# Patient Record
Sex: FEMALE | Race: WHITE | NOT HISPANIC OR LATINO | Employment: OTHER | URBAN - METROPOLITAN AREA
[De-identification: names, ages, dates, MRNs, and addresses within clinical notes are randomized per-mention and may not be internally consistent; named-entity substitution may affect disease eponyms.]

---

## 2022-01-14 ENCOUNTER — TRANSCRIBE ORDERS (OUTPATIENT)
Dept: ADMINISTRATIVE | Facility: HOSPITAL | Age: 77
End: 2022-01-14

## 2022-01-14 DIAGNOSIS — A04.72 C. DIFFICILE ENTERITIS: Primary | ICD-10-CM

## 2022-01-17 NOTE — NURSING NOTE
1440-  Pt notified earlier that Zinplava infusion had not yet arrived to our pharmacy and request she call in the morning before coming to the appointment. Pt had questions r/t the medication, questions answered and side effect of possible CHF  And the need for f/u by the office by phone afterwards. Pt states unsure if she wants this. Encouraged her to discuss with the Dr that ordered the medication.    Pt called back and states after talking with the nurse at Dr's office she has decided she does not want this medicine. States she will be starting  another medicine, Deficit tomorrow and has colonoscopy Thursday.     Informed our in house pharmacist.

## 2022-01-18 ENCOUNTER — HOSPITAL ENCOUNTER (OUTPATIENT)
Dept: INFUSION THERAPY | Facility: HOSPITAL | Age: 77
Discharge: HOME | End: 2022-01-18

## 2022-01-20 ENCOUNTER — TRANSCRIBE ORDERS (OUTPATIENT)
Dept: ADMINISTRATIVE | Facility: HOSPITAL | Age: 77
End: 2022-01-20

## 2022-01-20 DIAGNOSIS — K62.89 ANAL OR RECTAL PAIN: Primary | ICD-10-CM

## 2022-01-20 DIAGNOSIS — R06.02 SHORTNESS OF BREATH: ICD-10-CM

## 2022-02-01 ENCOUNTER — HOSPITAL ENCOUNTER (OUTPATIENT)
Dept: CT IMAGING | Facility: HOSPITAL | Age: 77
Discharge: HOME | End: 2022-02-01
Admitting: COLON & RECTAL SURGERY

## 2022-02-01 DIAGNOSIS — R06.02 SHORTNESS OF BREATH: ICD-10-CM

## 2022-02-01 DIAGNOSIS — K62.89 ANAL OR RECTAL PAIN: ICD-10-CM

## 2022-02-01 PROCEDURE — 71250 CT THORAX DX C-: CPT

## 2022-02-01 PROCEDURE — 74176 CT ABD & PELVIS W/O CONTRAST: CPT

## 2022-02-03 ENCOUNTER — HOSPITAL ENCOUNTER (OUTPATIENT)
Dept: MRI IMAGING | Facility: HOSPITAL | Age: 77
Discharge: HOME | End: 2022-02-03
Admitting: COLON & RECTAL SURGERY

## 2022-02-03 DIAGNOSIS — K62.89 ANAL OR RECTAL PAIN: ICD-10-CM

## 2022-02-03 PROCEDURE — 72195 MRI PELVIS W/O DYE: CPT

## 2022-02-09 ENCOUNTER — HOSPITAL ENCOUNTER (OUTPATIENT)
Dept: RADIATION ONCOLOGY | Facility: HOSPITAL | Age: 77
Setting detail: RADIATION/ONCOLOGY SERIES
Discharge: HOME | End: 2022-02-09

## 2022-02-10 ENCOUNTER — TELEPHONE (OUTPATIENT)
Dept: RADIATION ONCOLOGY | Facility: HOSPITAL | Age: 77
End: 2022-02-10

## 2022-02-10 NOTE — TELEPHONE ENCOUNTER
Radiation Oncology appointment and outside film reminder-Spoke with patient she verbalized understanding on importance of bringing outside films to consult-RONA Ryan

## 2022-02-14 ENCOUNTER — APPOINTMENT (OUTPATIENT)
Dept: MRI IMAGING | Facility: HOSPITAL | Age: 77
End: 2022-02-14

## 2022-02-15 ENCOUNTER — NURSE NAVIGATOR (OUTPATIENT)
Dept: ONCOLOGY | Facility: CLINIC | Age: 77
End: 2022-02-15

## 2022-02-15 NOTE — PROGRESS NOTES
Danielle in Radiation called me yesterday to ask about patient to see why she did not show up for her appointment with Dr. So yesterday.  I reached out to patient today and she said that she had some issues she was trying to work out at a doctor's office.  She said that MD had ordered an MRI and she had already had a MRI.  I introduced my role to patient and asked if I could help her sort the issues out for her.  She said that she was going to think on it and maybe call me back. I gave patient my name and my number to call if she decided to let me navigate her.  Patient thanked me. I notified Danielle in Radiation about patient's concerns. I also Dr. Henderson and asked her to please call the patient. AG

## 2022-02-17 ENCOUNTER — LAB REQUISITION (OUTPATIENT)
Dept: LAB | Facility: HOSPITAL | Age: 77
End: 2022-02-17

## 2022-02-17 DIAGNOSIS — K62.89 OTHER SPECIFIED DISEASES OF ANUS AND RECTUM: ICD-10-CM

## 2022-02-17 LAB
CYTO UR: NORMAL
LAB AP CASE REPORT: NORMAL
LAB AP CLINICAL INFORMATION: NORMAL
LAB AP DIAGNOSIS COMMENT: NORMAL
PATH REPORT.FINAL DX SPEC: NORMAL
PATH REPORT.GROSS SPEC: NORMAL

## 2022-02-21 ENCOUNTER — OFFICE VISIT (OUTPATIENT)
Dept: RADIATION ONCOLOGY | Facility: HOSPITAL | Age: 77
End: 2022-02-21

## 2022-02-21 VITALS
HEIGHT: 61 IN | DIASTOLIC BLOOD PRESSURE: 77 MMHG | BODY MASS INDEX: 25.3 KG/M2 | SYSTOLIC BLOOD PRESSURE: 129 MMHG | HEART RATE: 67 BPM | RESPIRATION RATE: 16 BRPM | WEIGHT: 134 LBS | OXYGEN SATURATION: 96 % | TEMPERATURE: 98.3 F

## 2022-02-21 DIAGNOSIS — C20 RECTAL CANCER: Primary | ICD-10-CM

## 2022-02-21 PROCEDURE — G0463 HOSPITAL OUTPT CLINIC VISIT: HCPCS

## 2022-02-21 RX ORDER — UBIDECARENONE 100 MG
100 CAPSULE ORAL DAILY
COMMUNITY

## 2022-02-21 RX ORDER — MAGNESIUM CHLORIDE 64 MG
128 TABLET, DELAYED RELEASE (ENTERIC COATED) ORAL NIGHTLY
COMMUNITY

## 2022-02-21 RX ORDER — LEVOTHYROXINE SODIUM 88 UG/1
88 TABLET ORAL DAILY
COMMUNITY
Start: 2021-12-26

## 2022-02-21 RX ORDER — SERTRALINE HYDROCHLORIDE 25 MG/1
25 TABLET, FILM COATED ORAL DAILY
COMMUNITY
Start: 2021-12-27

## 2022-02-21 RX ORDER — VIT C/B6/B5/MAGNESIUM/HERB 173 50-5-6-5MG
1500 CAPSULE ORAL DAILY
COMMUNITY

## 2022-02-21 NOTE — PROGRESS NOTES
CONSULTATION NOTE    NAME:      Karolina Cardoso  :                                                          1945  DATE OF CONSULTATION:                       22  REQUESTING PHYSICIAN:                   Kristi Henderson MD  REASON FOR CONSULTATION:           Further evaluation and management of the patient's adenocarcinoma the rectum T3 N0 M0 for consideration of total neoadjuvant therapy at request of Dr. Henderson           BRIEF HISTORY:  Karolina Cardoso  is a very pleasant 76 y.o. female  with history of hemorrhoids and rectal prolapse as well as C. difficile who began to have loose stools about 3 months ago.  She reports that things got worse in December after she finished treatments for C. difficile.  The patient reported some bleeding from her hemorrhoids but no obvious bleeding in the stool.  The patient was then seen by Dr. Henderson who performed a colonoscopy 2022.  This showed a tumor at the second rectal valve which was ulcerated and circumferential.  Pathology was consistent with adenocarcinoma MSI intact.  The patient was then sent for staging CT scan abdomen pelvis as well as the chest.  This showed groundglass opacities in the lungs and some rectal fullness.  The patient also had some cysts in the liver.  The patient was then sent for an MRI 2/3/2022 of the pelvis that showed a tumor 8 cm from the dentate line.  The patient's tumor straddled the peritoneal reflection.  Craniocaudally the tumor measured about 3 cm.  Was felt to be a T3 based on imaging.  There is greater than 6 mm to the mesorectal fascia.  The patient had a questionable lymph node of uncertain significance measuring by 2mm from the mesorectal fascia on MRI scan.  Confusingly the patient's CT scan of the chest was read by different reader and it was read that the patient had low-density masses in the liver concerning for metastatic disease.  The patient case was presented at tumor board and it was felt that the patient should have a  CT scan of the abdomen to clarify the lesions in the liver their significance.  The patient was referred to [facility] for consideration of radiation and chemotherapy.    The patient reports that she is going through a lot personally right now she has a daughter who is going to the process for liver transplant.  She reports that her daughter is presently in the ER as we speak at the consult getting a paracentesis.  The patient reports that she has had rectal issues for quite some time.  The patient denies any fevers, chills, nausea, vomiting.  Patient reports that she already uses baby wipes.      BMI:  Body mass index is 25.32 kg/m².      Social History     Substance and Sexual Activity   Alcohol Use Not Currently   • Alcohol/week: 1.0 - 2.0 standard drink   • Types: 1 - 2 Glasses of wine per week       Allergies   Allergen Reactions   • Tetracycline Rash       Social History     Tobacco Use   • Smoking status: Former Smoker     Quit date:      Years since quittin.1   • Smokeless tobacco: Never Used   Substance Use Topics   • Alcohol use: Not Currently     Alcohol/week: 1.0 - 2.0 standard drink     Types: 1 - 2 Glasses of wine per week   • Drug use: Not on file         Past Medical History:   Diagnosis Date   • Rectal cancer (HCC)        family history includes Colon cancer in her sister.     Past Surgical History:   Procedure Laterality Date   • COLONOSCOPY  2022   • CORONARY ARTERY BYPASS GRAFT          Review of Systems   Constitutional: Positive for appetite change.   Gastrointestinal: Positive for blood in stool, constipation and rectal pain.        Rectal pressure/ pain   Musculoskeletal: Positive for arthralgias.   Psychiatric/Behavioral: Positive for sleep disturbance.    A full 14 point review of systems was performed and was negative except as noted in the HPI.         Objective   VITAL SIGNS:   Vitals:    22 1015   BP: 129/77   Pulse: 67   Resp: 16   Temp: 98.3 °F (36.8 °C)  "  SpO2: 96%  Comment: RA   Weight: 60.8 kg (134 lb)   Height: 154.9 cm (61\")   PainSc: 0-No pain        KPS       90%    Physical Exam  Vitals and nursing note reviewed.   Constitutional:       Appearance: She is well-developed.   HENT:      Head: Normocephalic and atraumatic.   Eyes:      Conjunctiva/sclera: Conjunctivae normal.      Pupils: Pupils are equal, round, and reactive to light.   Neck:      Comments: No obviously enlarged cervical or supraclavicular LAD.  Cardiovascular:      Comments: Patient well perfused. Non cyanotic. No prominent JVD. No pedal edema  Pulmonary:      Effort: Pulmonary effort is normal.      Breath sounds: Normal breath sounds. No stridor. No wheezing.   Abdominal:      General: There is no distension.      Palpations: Abdomen is soft.   Genitourinary:     Comments: Rectal exam deferred per patient until next week when she makes up her mind  Musculoskeletal:         General: Normal range of motion.      Cervical back: Normal range of motion and neck supple.      Comments: Patient moves all extremities spontaneously.    Skin:     General: Skin is warm and dry.   Neurological:      Mental Status: She is alert and oriented to person, place, and time.      Comments: Coordination intact.   Psychiatric:         Behavior: Behavior normal.         Thought Content: Thought content normal.         Judgment: Judgment normal.          The following portions of the patient's history were reviewed and updated as appropriate: allergies, current medications, past family history, past medical history, past social history, past surgical history and problem list.    I have personally requested reviewed and interpreted the patient's images and radiology reports and pathology reports listed below:  CT Abdomen Pelvis Without Contrast    Result Date: 2/1/2022  1.  Some fullness to the rectal area. Assessment is limited by the lack of contrast in this area. An underlying primary colonic process in the rectal " area not excluded. There is some stranding in the fat around this area that may relate to inflammation. 2.  Moderate stool burden within the colon that could relate to constipation. 3.  Hypodense areas within the liver that may relate to cysts or hemangiomas difficult to characterize on this noncontrasted exam. 4.  Atherosclerotic changes are present. 5.  Degenerative change lumbar spine. 6.  Groundglass changes within the lungs that may be reflective of Covid pneumonia.  This report was finalized on 2/1/2022 4:09 PM by Miguel Paulino MD.      CT Chest Without Contrast Diagnostic    Result Date: 2/1/2022   1.  Areas of patchy bilateral groundglass airspace disease favoring infectious or inflammatory process. 2.  No definite metastatic disease within the chest. 3.  Low density masses within the liver which are suspicious for metastatic disease. 4.  Thickening of the fundus of the stomach which is nonspecific.  Upper endoscopy should be considered to exclude true underlying mucosal lesion.  This report was finalized on 2/1/2022 3:58 PM by Brandon Zendejas MD.      MRI Pelvis Without Contrast    Addendum Date: 2/6/2022    Addendum for subspecialist review and completion of structured staging reporting.  CLINICAL INFORMATION: POLYPOID RECTAL MASS ON PROCTOSCOPY.  IMAGING PROCEDURE DESCRIPTION:      Image Quality: ADEQUATE      Magnet: 1.5 T      Sequences: RECTAL CANCER STAGING PROTOCOL INCLUDING AXIAL T2 FAT SAT, AXIAL STIR, CORONAL STIR, AXIAL, CORONAL, AND SAGITTAL T2 NONFAT SAT OF THE RECTUM.  FINDINGS: 1.) TUMOR LOCATION AND CHARACTERISTICS i) Distance of Inferior margin of Tumor from the dentate line: 8 CM ii) Tumor at or below the puborectalis sling:  ABOVE iii) Relationship to the anterior peritoneal reflection: STRADDLES (Sagittal T2 image 9 series 7) iv) Craniocaudal length of the tumor: 3cm v) Clock face of tumor: 4o'clock to 2o'clock vi) Polypoid/ Annular/ Semi-annular: MIXED WITH BOTH A POSTERIOR SUPERIOR  POLYPOID COMPONENT AND A NEARLY ANNULAR INFERIOR COMPONENT. vii) Mucinous: NO  2.) EXTRAMURAL DEPTH OF INVASION AND MR T-CATEGORY i) Extramural depth of invasion (Use 0mm for T1 or T2 tumor): 6 mm (anteriorly on image 15 series 10) ii) T category: T3           *Please indicate structures with possible invasion.  Specify laterality, sequence and slice#: (see list below) *  Anterior peritoneal reflection (T4a tumor): NO *  Puborectalis: NO *  Bladder: NO *  Vascular Involvement of Iliac Vessels: NO *  Levator ani: NO *  Ureters: NO *  Obturator: NO *  Prostate: NOT APPLICABLE *  Piriformis: NO *  Uterus: NO *  Pelvic Bone: NO *  Vagina: NO *  Sacrum: NO *  Urethra: NO *  Other: NONE    iii) For low rectal tumors (maximum tumor depth at or below the puborectalis sling):  *  Not applicable (tumor above the puborectalis sling: YES  3. RELATIONSHIP OF THE TUMOR TO MESORECTAL FASCIA (MRF)   i) Shortest distance 6mm of the definitive tumour border to the MRF is: AT ANTERIOR  11-12:00 POSITION  ii) Are there any tumour spiculations closer to the MRF? NO  4. EXTRAMURAL VENOUS INVASION    i) Extramural Venous Invasion (EMVI): ABSENT  5. MESORECTAL LYMPH NODES AND TUMOUR DEPOSITS    i) Any suspicious mesorectal lymph nodes/tumor deposits: EQUIVOCAL ROUND 6 MM LYMPH NODE ON THE RIGHT AT THE 9:00 POSITION BEST SEEN ON AXIAL IMAGE 14 OF SERIES 8     *If yes, the most suspicious node/tumor deposit is: The lymph node is 2 mm from the mesorectal fascia on the right 9:00 position  6. EXTRAMESORECTAL LYMPH NODES     i) Any suspicious extramesorectal lymph nodes: NONE    ii) Is the ANA node station in the field of view: NO      7. OTHER FINDINGS (COMPLICATIONS, METASTASES, LIMITATIONS)        INCIDENTAL SMALL LEFT FAT-CONTAINING INGUINAL HERNIA. NO SUSPICIOUS OSSEOUS LESIONS.  IMPRESSIONS: MRI rectal cancer T category is: T3 Maximum EMD of invasion is: 6 MM Minimum tumor to MRF distance is: 6 MM Low rectal tumor component: NO Mesorectal  nodes/tumor deposits: EQUIVOCAL EMVI: NO Extramesorectal nodes: NO        This report was finalized on 2/6/2022 10:58 AM by Ricky Pennington MD.      Result Date: 2/6/2022  1.  4.6 cm mass within rectosigmoid junction, compatible with known neoplasm. 2.  No definite evidence of metastasis within pelvis.  This report was finalized on 2/3/2022 12:42 PM by Senthil Vazquez MD.      I reviewed all the images personally.  I discussed case personally with Dr. Henderson.  I review Dr. English's most recent note.  I reviewed the patient's colonoscopy report.  I reviewed the patient's pathology report.      Assessment      IMPRESSION:     The patient is a very pleasant 76-year-old female with a T3 N0 M1 adenocarcinoma of the rectum with a questionable lymph node and a large circumferential tumor.  The patient has serious rectal issues at baseline.  The patient is interested in potentially trying to avoid surgery if possible.  We discussed treatment options today and I recommended total neoadjuvant therapy and alignment with the patient's tumor board discussion.  We discussed that the patient would likely need to receive 50.4-54 Gray in 1.8 Gray per fraction.  I would recommend IMRT and IGR T given the tumors location to large amount of adjacent bowel.  We discussed the process for planning, and treatments.  IGR T and IMRT will also allow for tighter margins and less sphincter overlap for treatments which would be useful in this patient with a compromised pelvic floor musculature at baseline.  We discussed integration of chemotherapy.  We discussed side effects and potential complications.  We discussed the rate of potential avoidance of surgery and the rates for local recurrence.  Time we discussed the relative roles of chemotherapy and its importance for treating distant disease.  I discussed the case with Dr. Henderson and the patient is interested in proceeding with a CT scan of her liver to rule out any distant disease that may be there  based on the contradictory reports in the CT scans.    Greater than 1 hour was spent preparing for and coordinating this visit. >50% of the time was spent in direct face to face conversation with the patient teaching, answering question, and providing explanations regarding the patient's case.  The decision to treat the patient with radiation is a complex one and carries the risk of long-term side effects and complications.  The patient's malignancy represents a complicated life threatening condition that requires complex multidisciplinary management for treatment and followup.     RECOMMENDATIONS:      T3 N0 M0 adenocarcinoma the rectum  -Recommend WOODY versus definitive  -Patient very serious about refusing surgery  -Recommend dose of 50 Gray to 54 Gray IMRT 1.8 Gray per fraction  -Recommend chemoradiotherapy followed by adjuvant chemotherapy plus or minus surgery based on response and patient preference  -Follows with Dr. Henderson  -Consultation with Dr. Graves on Friday  -Patient booked for CT simulation next week after follow-up visit with myself.    Grade 3 hemorrhoids  -Patient high risk for worsening of hemorrhoids during treatment    Rectal prolapse  -Patient's pelvic floor reportedly dysfunctional baseline  -Increases risk of pelvic floor insufficiency in the future           Ish So MD        Errors in dictation may reflect use of voice recognition software and not all errors in transcription may have been detected prior to signing.

## 2022-02-23 ENCOUNTER — NURSE NAVIGATOR (OUTPATIENT)
Dept: ONCOLOGY | Facility: CLINIC | Age: 77
End: 2022-02-23

## 2022-02-23 NOTE — PROGRESS NOTES
I talked with patient today to follow up from our last conversation. Patient requested that I cancel her 2/25 appointment with Dr. Graves due to the fact that she was going for a second opinion at the beginning of next week. I encouraged patient to please call me if she did want me to reschedule with Dr. Graves and patient verbalized understanding. AG

## 2022-06-30 ENCOUNTER — PREP FOR SURGERY (OUTPATIENT)
Dept: OTHER | Facility: HOSPITAL | Age: 77
End: 2022-06-30

## 2022-07-12 ENCOUNTER — HOSPITAL ENCOUNTER (OUTPATIENT)
Dept: GENERAL RADIOLOGY | Facility: HOSPITAL | Age: 77
Discharge: HOME | End: 2022-07-12

## 2022-07-12 ENCOUNTER — PRE-ADMISSION TESTING (OUTPATIENT)
Dept: PREADMISSION TESTING | Facility: HOSPITAL | Age: 77
End: 2022-07-12

## 2022-07-12 LAB
ABO GROUP BLD: NORMAL
ALBUMIN SERPL-MCNC: 4.1 G/DL (ref 3.5–5.2)
ALBUMIN/GLOB SERPL: 1.6 G/DL
ALP SERPL-CCNC: 70 U/L (ref 39–117)
ALT SERPL W P-5'-P-CCNC: 8 U/L (ref 1–33)
ANION GAP SERPL CALCULATED.3IONS-SCNC: 9 MMOL/L (ref 5–15)
AST SERPL-CCNC: 12 U/L (ref 1–32)
BILIRUB SERPL-MCNC: 0.3 MG/DL (ref 0–1.2)
BLD GP AB SCN SERPL QL: NEGATIVE
BUN SERPL-MCNC: 17 MG/DL (ref 8–23)
BUN/CREAT SERPL: 30.9 (ref 7–25)
CALCIUM SPEC-SCNC: 8.7 MG/DL (ref 8.6–10.5)
CEA SERPL-MCNC: 14.6 NG/ML
CHLORIDE SERPL-SCNC: 106 MMOL/L (ref 98–107)
CO2 SERPL-SCNC: 27 MMOL/L (ref 22–29)
CREAT SERPL-MCNC: 0.55 MG/DL (ref 0.57–1)
DEPRECATED RDW RBC AUTO: 43.3 FL (ref 37–54)
EGFRCR SERPLBLD CKD-EPI 2021: 94.5 ML/MIN/1.73
ERYTHROCYTE [DISTWIDTH] IN BLOOD BY AUTOMATED COUNT: 13.9 % (ref 12.3–15.4)
GLOBULIN UR ELPH-MCNC: 2.5 GM/DL
GLUCOSE SERPL-MCNC: 110 MG/DL (ref 65–99)
HBA1C MFR BLD: 5.5 % (ref 4.8–5.6)
HCT VFR BLD AUTO: 40.2 % (ref 34–46.6)
HGB BLD-MCNC: 13 G/DL (ref 12–15.9)
MCH RBC QN AUTO: 27.8 PG (ref 26.6–33)
MCHC RBC AUTO-ENTMCNC: 32.3 G/DL (ref 31.5–35.7)
MCV RBC AUTO: 85.9 FL (ref 79–97)
PLATELET # BLD AUTO: 288 10*3/MM3 (ref 140–450)
PMV BLD AUTO: 9.8 FL (ref 6–12)
POTASSIUM SERPL-SCNC: 4 MMOL/L (ref 3.5–5.2)
PROT SERPL-MCNC: 6.6 G/DL (ref 6–8.5)
QT INTERVAL: 408 MS
QTC INTERVAL: 400 MS
RBC # BLD AUTO: 4.68 10*6/MM3 (ref 3.77–5.28)
RH BLD: POSITIVE
SARS-COV-2 RNA PNL SPEC NAA+PROBE: NOT DETECTED
SODIUM SERPL-SCNC: 142 MMOL/L (ref 136–145)
T&S EXPIRATION DATE: NORMAL
WBC NRBC COR # BLD: 5.11 10*3/MM3 (ref 3.4–10.8)

## 2022-07-12 PROCEDURE — 86900 BLOOD TYPING SEROLOGIC ABO: CPT

## 2022-07-12 PROCEDURE — 85027 COMPLETE CBC AUTOMATED: CPT

## 2022-07-12 PROCEDURE — C9803 HOPD COVID-19 SPEC COLLECT: HCPCS

## 2022-07-12 PROCEDURE — 93010 ELECTROCARDIOGRAM REPORT: CPT | Performed by: INTERNAL MEDICINE

## 2022-07-12 PROCEDURE — 82378 CARCINOEMBRYONIC ANTIGEN: CPT

## 2022-07-12 PROCEDURE — 93005 ELECTROCARDIOGRAM TRACING: CPT

## 2022-07-12 PROCEDURE — 80053 COMPREHEN METABOLIC PANEL: CPT

## 2022-07-12 PROCEDURE — 86901 BLOOD TYPING SEROLOGIC RH(D): CPT

## 2022-07-12 PROCEDURE — 71046 X-RAY EXAM CHEST 2 VIEWS: CPT

## 2022-07-12 PROCEDURE — 36415 COLL VENOUS BLD VENIPUNCTURE: CPT

## 2022-07-12 PROCEDURE — U0004 COV-19 TEST NON-CDC HGH THRU: HCPCS

## 2022-07-12 PROCEDURE — 83036 HEMOGLOBIN GLYCOSYLATED A1C: CPT

## 2022-07-12 PROCEDURE — 86850 RBC ANTIBODY SCREEN: CPT

## 2022-07-12 RX ORDER — ZINC GLUCONATE 50 MG
50 TABLET ORAL DAILY
COMMUNITY

## 2022-07-12 NOTE — PAT
An arrival time for procedure was not given during PAT visit. If patient had any questions or concerns about their arrival time, they were instructed to contact their surgeon/physician.  Additionally, if the patient referred to an arrival time that was acquired from their my chart account, patient was encouraged to verify that time with their surgeon/physician.  NO arrival times given in Pre Admission Testing Department.    Patient to apply Chlorhexadine wipes  to surgical area (as instructed) the night before procedure and the AM of procedure. Wipes provided.    Patient denies any current skin issues.     Patient instructed to drink 20 ounces (or until full) of Gatorade and it needs to be completed 1 hour (for Main OR patients) or 2 hours (scheduled  section patients) before given arrival time for procedure (NO RED Gatorade)    Patient verbalized understanding.    Patient viewed general PAT education video as instructed in their preoperative information received from their surgeon.  Patient stated the general PAT education video was viewed in its entirety and survey completed.  Copies of [facility] general education handouts (Incentive Spirometry, Meds to Beds Program, Patient Belongings, Pre-op skin preparation instructions, Blood Glucose testing, Visitor policy, Surgery FAQ, Code H) distributed to patient if not printed. Education related to the PAT pass and skin preparation for surgery (if applicable) completed in PAT as a reinforcement to PAT education video. Patient instructed to return PAT pass provided today as well as completed skin preparation sheet (if applicable) on the day of procedure.     Additionally if patient had not viewed video yet but intended to view it at home or in our waiting area, then referred them to the handout with QR code/link provided during PAT visit.  Instructed patient to complete survey after viewing the video in its entirety.  Encouraged patient/family to read [facility] general  education handouts thoroughly and notify PAT staff with any questions or concerns. Patient verbalized understanding of all information and priority content.    Blood bank bracelet applied to patient during Pre Admission Testing visit.  Patient instructed not to remove from arm until after procedure and they are discharged from the hospital.  Explained to patient that they may shower and get the bracelet wet, but not to immerse under water for longer periods (bathing, swimming, hand dishwashing, etc).  Patient verbalized understanding.    Patient directed to Radiology Department for CXR after Pre Admission Testing Appointment.     PT HAS C-DIFF AND STARTED DIFICID ON 7/11/22. DR. GALLO AWARE AND PRESCRIBED MEDICATION.     WOC (LAURENT) TO MEET WITH PT IN CONFERENCE ROOM.    LM WITH ROSSI BRAVO-GI NURSE NAVIGATOR.     PT DID NOT SIGN CONSENT. PT WANTS TO TALK TO DR. GALLO AND WANTS TO DISCUSS URETHERAL CATHETERS PRIOR TO SIGNING. BLUE NOTE ON CHART WITH PRE-FILLED CONSENT FROM DR. GALLO'S OFFICE.

## 2022-07-12 NOTE — DISCHARGE INSTRUCTIONS
call pain, fever, bleeding, nausea, vomiting, redness or drainage from incision site ,may take Motrin for incisional pain,Continue regular diet.  No lifting over 10 pounds.

## 2022-07-12 NOTE — NURSING NOTE
Patient seen in PAT for pre-op teaching/counseling.       Presented patient with colostomy pre-op kit from [facility] and discussed that her sister had an ostomy and she assisted her with applying her pouches.  Educated her that [facility] team will plan to see her prior to her surgery tomorrow to make ostomy sites on her abdomen and every day while she is in the hospital to provide ostomy/stoma education, and how to order supplies.   Also, informed her that following discharge, the WOC team would support her and that we have an outpatient ostomy clinic if she has any issues with her pouches or peristomal skin following discharge.       WOC Nurse will plan to see patient tomorrow prior to her surgery scheduled at 13:00 with Dr Henderson.

## 2022-07-12 NOTE — PAT
ELLA IN PRE-OP INSTRUCTED ME TO INSTRUCT PT TO CALL REGISTRATION BEFORE COMING IN THE BUILDING D/T C-DIFF AND THEY WOULD TAKE APPROPRIATE STEPS FROM THERE. INSTRUCTED PT. PT VERBALIZED UNDERSTANDING.     CALLED AND SPOKE WITH CLEOPATRA IN RADIOLOGY AND LET HER KNOW PT WOULD BE COMING OVER FOR CXR AND HAS C-DIFF.     CALLED KORIN IN ID AND MADE HER AWARE PT HAS RECENT C-DIFF INFECTION AND STARTED MEDICATION ON 7/11/22. KORIN TO UPDATED IN SYSTEM.

## 2022-07-13 ENCOUNTER — ANESTHESIA EVENT (OUTPATIENT)
Dept: PERIOP | Facility: HOSPITAL | Age: 77
End: 2022-07-13

## 2022-07-13 ENCOUNTER — ANESTHESIA EVENT CONVERTED (OUTPATIENT)
Dept: ANESTHESIOLOGY | Facility: HOSPITAL | Age: 77
End: 2022-07-13

## 2022-07-13 ENCOUNTER — HOSPITAL ENCOUNTER (INPATIENT)
Facility: HOSPITAL | Age: 77
LOS: 6 days | Discharge: HOME | End: 2022-07-19
Attending: COLON & RECTAL SURGERY | Admitting: COLON & RECTAL SURGERY

## 2022-07-13 ENCOUNTER — ANESTHESIA (OUTPATIENT)
Dept: PERIOP | Facility: HOSPITAL | Age: 77
End: 2022-07-13

## 2022-07-13 DIAGNOSIS — R10.9 ABDOMINAL PAIN: ICD-10-CM

## 2022-07-13 DIAGNOSIS — Z90.49 S/P COLON RESECTION: Primary | ICD-10-CM

## 2022-07-13 DIAGNOSIS — E03.9 HYPOTHYROIDISM, UNSPECIFIED TYPE: ICD-10-CM

## 2022-07-13 DIAGNOSIS — C20 RECTAL CANCER: ICD-10-CM

## 2022-07-13 LAB
ABO GROUP BLD: NORMAL
GLUCOSE BLDC GLUCOMTR-MCNC: 166 MG/DL (ref 70–130)
HCT VFR BLD AUTO: 33.1 % (ref 34–46.6)
HGB BLD-MCNC: 10.6 G/DL (ref 12–15.9)
RH BLD: POSITIVE

## 2022-07-13 PROCEDURE — 82962 GLUCOSE BLOOD TEST: CPT

## 2022-07-13 PROCEDURE — 8E0W4CZ ROBOTIC ASSISTED PROCEDURE OF TRUNK REGION, PERCUTANEOUS ENDOSCOPIC APPROACH: ICD-10-PCS | Performed by: COLON & RECTAL SURGERY

## 2022-07-13 PROCEDURE — 52332 CYSTOSCOPY AND TREATMENT: CPT | Performed by: UROLOGY

## 2022-07-13 PROCEDURE — 85018 HEMOGLOBIN: CPT | Performed by: COLON & RECTAL SURGERY

## 2022-07-13 PROCEDURE — 25010000002 ERTAPENEM PER 500 MG: Performed by: COLON & RECTAL SURGERY

## 2022-07-13 PROCEDURE — 0DBP4ZZ EXCISION OF RECTUM, PERCUTANEOUS ENDOSCOPIC APPROACH: ICD-10-PCS | Performed by: COLON & RECTAL SURGERY

## 2022-07-13 PROCEDURE — 0T788DZ DILATION OF BILATERAL URETERS WITH INTRALUMINAL DEVICE, VIA NATURAL OR ARTIFICIAL OPENING ENDOSCOPIC: ICD-10-PCS | Performed by: UROLOGY

## 2022-07-13 PROCEDURE — 25010000002 ONDANSETRON PER 1 MG: Performed by: NURSE ANESTHETIST, CERTIFIED REGISTERED

## 2022-07-13 PROCEDURE — 25010000002 FENTANYL CITRATE (PF) 50 MCG/ML SOLUTION

## 2022-07-13 PROCEDURE — 25010000002 DEXAMETHASONE SODIUM PHOSPHATE 10 MG/ML SOLUTION: Performed by: NURSE ANESTHETIST, CERTIFIED REGISTERED

## 2022-07-13 PROCEDURE — 25010000002 DEXAMETHASONE PER 1 MG: Performed by: NURSE ANESTHETIST, CERTIFIED REGISTERED

## 2022-07-13 PROCEDURE — 0D1N4Z4 BYPASS SIGMOID COLON TO CUTANEOUS, PERCUTANEOUS ENDOSCOPIC APPROACH: ICD-10-PCS | Performed by: COLON & RECTAL SURGERY

## 2022-07-13 PROCEDURE — 25010000002 HEPARIN (PORCINE) PER 1000 UNITS: Performed by: COLON & RECTAL SURGERY

## 2022-07-13 PROCEDURE — S0260 H&P FOR SURGERY: HCPCS | Performed by: PHYSICIAN ASSISTANT

## 2022-07-13 PROCEDURE — 63710000001 PROMETHAZINE PER 25 MG

## 2022-07-13 PROCEDURE — 86901 BLOOD TYPING SEROLOGIC RH(D): CPT

## 2022-07-13 PROCEDURE — 0DBM4ZZ EXCISION OF DESCENDING COLON, PERCUTANEOUS ENDOSCOPIC APPROACH: ICD-10-PCS | Performed by: COLON & RECTAL SURGERY

## 2022-07-13 PROCEDURE — 85014 HEMATOCRIT: CPT | Performed by: COLON & RECTAL SURGERY

## 2022-07-13 PROCEDURE — 0DBN4ZZ EXCISION OF SIGMOID COLON, PERCUTANEOUS ENDOSCOPIC APPROACH: ICD-10-PCS | Performed by: COLON & RECTAL SURGERY

## 2022-07-13 PROCEDURE — 0 LIDOCAINE 1 % SOLUTION: Performed by: NURSE ANESTHETIST, CERTIFIED REGISTERED

## 2022-07-13 PROCEDURE — 0T9B80Z DRAINAGE OF BLADDER WITH DRAINAGE DEVICE, VIA NATURAL OR ARTIFICIAL OPENING ENDOSCOPIC: ICD-10-PCS | Performed by: UROLOGY

## 2022-07-13 PROCEDURE — 88309 TISSUE EXAM BY PATHOLOGIST: CPT | Performed by: COLON & RECTAL SURGERY

## 2022-07-13 PROCEDURE — 86900 BLOOD TYPING SEROLOGIC ABO: CPT

## 2022-07-13 PROCEDURE — 25010000002 PROPOFOL 10 MG/ML EMULSION: Performed by: NURSE ANESTHETIST, CERTIFIED REGISTERED

## 2022-07-13 DEVICE — SUREFORM 45 RELOAD GREEN
Type: IMPLANTABLE DEVICE | Site: ABDOMEN | Status: FUNCTIONAL
Brand: SUREFORM

## 2022-07-13 DEVICE — DEV WND/CLS STRATAFIX SPIRALPDS PLS CT 2/0 15CM 26MM VIL: Type: IMPLANTABLE DEVICE | Site: ABDOMEN | Status: FUNCTIONAL

## 2022-07-13 RX ORDER — DEXAMETHASONE SODIUM PHOSPHATE 10 MG/ML
INJECTION, SOLUTION INTRAMUSCULAR; INTRAVENOUS
Status: COMPLETED | OUTPATIENT
Start: 2022-07-13 | End: 2022-07-13

## 2022-07-13 RX ORDER — MIDAZOLAM HYDROCHLORIDE 1 MG/ML
0.5 INJECTION INTRAMUSCULAR; INTRAVENOUS
Status: DISCONTINUED | OUTPATIENT
Start: 2022-07-13 | End: 2022-07-13 | Stop reason: HOSPADM

## 2022-07-13 RX ORDER — PROMETHAZINE HYDROCHLORIDE 25 MG/1
25 SUPPOSITORY RECTAL ONCE AS NEEDED
Status: COMPLETED | OUTPATIENT
Start: 2022-07-13 | End: 2022-07-13

## 2022-07-13 RX ORDER — ONDANSETRON 2 MG/ML
4 INJECTION INTRAMUSCULAR; INTRAVENOUS EVERY 6 HOURS PRN
Status: DISCONTINUED | OUTPATIENT
Start: 2022-07-13 | End: 2022-07-19 | Stop reason: HOSPADM

## 2022-07-13 RX ORDER — PREGABALIN 75 MG/1
75 CAPSULE ORAL ONCE
Status: COMPLETED | OUTPATIENT
Start: 2022-07-13 | End: 2022-07-13

## 2022-07-13 RX ORDER — LIDOCAINE HYDROCHLORIDE 10 MG/ML
0.5 INJECTION, SOLUTION EPIDURAL; INFILTRATION; INTRACAUDAL; PERINEURAL ONCE AS NEEDED
Status: COMPLETED | OUTPATIENT
Start: 2022-07-13 | End: 2022-07-13

## 2022-07-13 RX ORDER — NALOXONE HCL 0.4 MG/ML
0.4 VIAL (ML) INJECTION
Status: DISCONTINUED | OUTPATIENT
Start: 2022-07-13 | End: 2022-07-19 | Stop reason: HOSPADM

## 2022-07-13 RX ORDER — FAMOTIDINE 20 MG/1
20 TABLET, FILM COATED ORAL ONCE
Status: COMPLETED | OUTPATIENT
Start: 2022-07-13 | End: 2022-07-13

## 2022-07-13 RX ORDER — BUPIVACAINE HYDROCHLORIDE 2.5 MG/ML
INJECTION, SOLUTION EPIDURAL; INFILTRATION; INTRACAUDAL
Status: COMPLETED | OUTPATIENT
Start: 2022-07-13 | End: 2022-07-13

## 2022-07-13 RX ORDER — SODIUM CHLORIDE 0.9 % (FLUSH) 0.9 %
10 SYRINGE (ML) INJECTION AS NEEDED
Status: DISCONTINUED | OUTPATIENT
Start: 2022-07-13 | End: 2022-07-13 | Stop reason: HOSPADM

## 2022-07-13 RX ORDER — ALVIMOPAN 12 MG/1
12 CAPSULE ORAL ONCE
Status: DISCONTINUED | OUTPATIENT
Start: 2022-07-13 | End: 2022-07-14

## 2022-07-13 RX ORDER — ONDANSETRON 2 MG/ML
INJECTION INTRAMUSCULAR; INTRAVENOUS AS NEEDED
Status: DISCONTINUED | OUTPATIENT
Start: 2022-07-13 | End: 2022-07-13 | Stop reason: SURG

## 2022-07-13 RX ORDER — FENTANYL CITRATE 50 UG/ML
50 INJECTION, SOLUTION INTRAMUSCULAR; INTRAVENOUS
Status: DISCONTINUED | OUTPATIENT
Start: 2022-07-13 | End: 2022-07-13 | Stop reason: HOSPADM

## 2022-07-13 RX ORDER — FAMOTIDINE 10 MG/ML
20 INJECTION, SOLUTION INTRAVENOUS ONCE
Status: CANCELLED | OUTPATIENT
Start: 2022-07-13 | End: 2022-07-13

## 2022-07-13 RX ORDER — LEVOTHYROXINE SODIUM 88 UG/1
88 TABLET ORAL DAILY
Status: DISCONTINUED | OUTPATIENT
Start: 2022-07-13 | End: 2022-07-19 | Stop reason: HOSPADM

## 2022-07-13 RX ORDER — PROMETHAZINE HYDROCHLORIDE 25 MG/1
TABLET ORAL
Status: COMPLETED
Start: 2022-07-13 | End: 2022-07-13

## 2022-07-13 RX ORDER — EPHEDRINE SULFATE 50 MG/ML
5 INJECTION, SOLUTION INTRAVENOUS ONCE AS NEEDED
Status: DISCONTINUED | OUTPATIENT
Start: 2022-07-13 | End: 2022-07-13 | Stop reason: HOSPADM

## 2022-07-13 RX ORDER — BUPIVACAINE HCL/0.9 % NACL/PF 0.125 %
PLASTIC BAG, INJECTION (ML) EPIDURAL AS NEEDED
Status: DISCONTINUED | OUTPATIENT
Start: 2022-07-13 | End: 2022-07-13 | Stop reason: SURG

## 2022-07-13 RX ORDER — SODIUM CHLORIDE, SODIUM LACTATE, POTASSIUM CHLORIDE, CALCIUM CHLORIDE 600; 310; 30; 20 MG/100ML; MG/100ML; MG/100ML; MG/100ML
9 INJECTION, SOLUTION INTRAVENOUS CONTINUOUS
Status: DISCONTINUED | OUTPATIENT
Start: 2022-07-13 | End: 2022-07-19 | Stop reason: HOSPADM

## 2022-07-13 RX ORDER — ACETAMINOPHEN 500 MG
1000 TABLET ORAL ONCE
Status: COMPLETED | OUTPATIENT
Start: 2022-07-13 | End: 2022-07-13

## 2022-07-13 RX ORDER — ROCURONIUM BROMIDE 10 MG/ML
INJECTION, SOLUTION INTRAVENOUS AS NEEDED
Status: DISCONTINUED | OUTPATIENT
Start: 2022-07-13 | End: 2022-07-13 | Stop reason: SURG

## 2022-07-13 RX ORDER — INDOCYANINE GREEN AND WATER 25 MG
KIT INJECTION AS NEEDED
Status: DISCONTINUED | OUTPATIENT
Start: 2022-07-13 | End: 2022-07-13 | Stop reason: HOSPADM

## 2022-07-13 RX ORDER — DEXAMETHASONE SODIUM PHOSPHATE 4 MG/ML
INJECTION, SOLUTION INTRA-ARTICULAR; INTRALESIONAL; INTRAMUSCULAR; INTRAVENOUS; SOFT TISSUE AS NEEDED
Status: DISCONTINUED | OUTPATIENT
Start: 2022-07-13 | End: 2022-07-13 | Stop reason: SURG

## 2022-07-13 RX ORDER — LIDOCAINE HYDROCHLORIDE 10 MG/ML
INJECTION, SOLUTION INFILTRATION; PERINEURAL AS NEEDED
Status: DISCONTINUED | OUTPATIENT
Start: 2022-07-13 | End: 2022-07-13 | Stop reason: SURG

## 2022-07-13 RX ORDER — PROMETHAZINE HYDROCHLORIDE 25 MG/1
25 TABLET ORAL ONCE AS NEEDED
Status: COMPLETED | OUTPATIENT
Start: 2022-07-13 | End: 2022-07-13

## 2022-07-13 RX ORDER — ALVIMOPAN 12 MG/1
12 CAPSULE ORAL 2 TIMES DAILY
Status: DISCONTINUED | OUTPATIENT
Start: 2022-07-14 | End: 2022-07-15

## 2022-07-13 RX ORDER — ACETAMINOPHEN 500 MG
1000 TABLET ORAL EVERY 6 HOURS
Status: DISPENSED | OUTPATIENT
Start: 2022-07-13 | End: 2022-07-15

## 2022-07-13 RX ORDER — MAGNESIUM HYDROXIDE 1200 MG/15ML
LIQUID ORAL AS NEEDED
Status: DISCONTINUED | OUTPATIENT
Start: 2022-07-13 | End: 2022-07-13 | Stop reason: HOSPADM

## 2022-07-13 RX ORDER — DEXTROSE, SODIUM CHLORIDE, AND POTASSIUM CHLORIDE 5; .45; .15 G/100ML; G/100ML; G/100ML
100 INJECTION INTRAVENOUS CONTINUOUS
Status: DISCONTINUED | OUTPATIENT
Start: 2022-07-13 | End: 2022-07-16

## 2022-07-13 RX ORDER — INSULIN LISPRO 100 [IU]/ML
0-7 INJECTION, SOLUTION INTRAVENOUS; SUBCUTANEOUS
Status: DISCONTINUED | OUTPATIENT
Start: 2022-07-13 | End: 2022-07-16

## 2022-07-13 RX ORDER — DIAZEPAM 5 MG/1
5 TABLET ORAL EVERY 6 HOURS PRN
Status: DISCONTINUED | OUTPATIENT
Start: 2022-07-13 | End: 2022-07-19 | Stop reason: HOSPADM

## 2022-07-13 RX ORDER — ENOXAPARIN SODIUM 100 MG/ML
40 INJECTION SUBCUTANEOUS EVERY 24 HOURS
Status: DISCONTINUED | OUTPATIENT
Start: 2022-07-14 | End: 2022-07-19 | Stop reason: HOSPADM

## 2022-07-13 RX ORDER — TRAMADOL HYDROCHLORIDE 50 MG/1
50 TABLET ORAL EVERY 6 HOURS PRN
Status: DISCONTINUED | OUTPATIENT
Start: 2022-07-13 | End: 2022-07-19 | Stop reason: HOSPADM

## 2022-07-13 RX ORDER — MELOXICAM 15 MG/1
15 TABLET ORAL ONCE
Status: COMPLETED | OUTPATIENT
Start: 2022-07-13 | End: 2022-07-13

## 2022-07-13 RX ORDER — EPHEDRINE SULFATE 50 MG/ML
INJECTION, SOLUTION INTRAVENOUS AS NEEDED
Status: DISCONTINUED | OUTPATIENT
Start: 2022-07-13 | End: 2022-07-13 | Stop reason: SURG

## 2022-07-13 RX ORDER — MORPHINE SULFATE 2 MG/ML
2 INJECTION, SOLUTION INTRAMUSCULAR; INTRAVENOUS EVERY 4 HOURS PRN
Status: DISCONTINUED | OUTPATIENT
Start: 2022-07-13 | End: 2022-07-19 | Stop reason: HOSPADM

## 2022-07-13 RX ORDER — ESMOLOL HYDROCHLORIDE 10 MG/ML
INJECTION INTRAVENOUS AS NEEDED
Status: DISCONTINUED | OUTPATIENT
Start: 2022-07-13 | End: 2022-07-13 | Stop reason: SURG

## 2022-07-13 RX ORDER — SODIUM CHLORIDE 0.9 % (FLUSH) 0.9 %
10 SYRINGE (ML) INJECTION EVERY 12 HOURS SCHEDULED
Status: DISCONTINUED | OUTPATIENT
Start: 2022-07-13 | End: 2022-07-13 | Stop reason: HOSPADM

## 2022-07-13 RX ORDER — ULTRASOUND COUPLING MEDIUM
GEL (GRAM) TOPICAL AS NEEDED
Status: DISCONTINUED | OUTPATIENT
Start: 2022-07-13 | End: 2022-07-13 | Stop reason: HOSPADM

## 2022-07-13 RX ORDER — PROPOFOL 10 MG/ML
VIAL (ML) INTRAVENOUS AS NEEDED
Status: DISCONTINUED | OUTPATIENT
Start: 2022-07-13 | End: 2022-07-13 | Stop reason: SURG

## 2022-07-13 RX ORDER — ONDANSETRON 4 MG/1
4 TABLET, FILM COATED ORAL EVERY 6 HOURS PRN
Status: DISCONTINUED | OUTPATIENT
Start: 2022-07-13 | End: 2022-07-19 | Stop reason: HOSPADM

## 2022-07-13 RX ORDER — HEPARIN SODIUM 5000 [USP'U]/ML
5000 INJECTION, SOLUTION INTRAVENOUS; SUBCUTANEOUS ONCE
Status: COMPLETED | OUTPATIENT
Start: 2022-07-13 | End: 2022-07-13

## 2022-07-13 RX ORDER — FENTANYL CITRATE 50 UG/ML
INJECTION, SOLUTION INTRAMUSCULAR; INTRAVENOUS
Status: COMPLETED
Start: 2022-07-13 | End: 2022-07-13

## 2022-07-13 RX ORDER — VANCOMYCIN HYDROCHLORIDE 125 MG/1
125 CAPSULE ORAL EVERY 6 HOURS SCHEDULED
Status: DISCONTINUED | OUTPATIENT
Start: 2022-07-13 | End: 2022-07-19 | Stop reason: HOSPADM

## 2022-07-13 RX ADMIN — DEXAMETHASONE SODIUM PHOSPHATE 2 MG: 10 INJECTION, SOLUTION INTRAMUSCULAR; INTRAVENOUS at 13:00

## 2022-07-13 RX ADMIN — EPHEDRINE SULFATE 15 MG: 50 INJECTION INTRAVENOUS at 13:02

## 2022-07-13 RX ADMIN — VANCOMYCIN HYDROCHLORIDE 125 MG: 125 CAPSULE ORAL at 21:22

## 2022-07-13 RX ADMIN — PROMETHAZINE HYDROCHLORIDE 25 MG: 25 TABLET ORAL at 17:01

## 2022-07-13 RX ADMIN — Medication 100 MCG: at 15:29

## 2022-07-13 RX ADMIN — POTASSIUM CHLORIDE, DEXTROSE MONOHYDRATE AND SODIUM CHLORIDE 100 ML/HR: 150; 5; 450 INJECTION, SOLUTION INTRAVENOUS at 18:53

## 2022-07-13 RX ADMIN — Medication 1 G: at 12:49

## 2022-07-13 RX ADMIN — DEXAMETHASONE SODIUM PHOSPHATE 4 MG: 4 INJECTION, SOLUTION INTRA-ARTICULAR; INTRALESIONAL; INTRAMUSCULAR; INTRAVENOUS; SOFT TISSUE at 12:55

## 2022-07-13 RX ADMIN — LIDOCAINE HYDROCHLORIDE 0.5 ML: 10 INJECTION, SOLUTION EPIDURAL; INFILTRATION; INTRACAUDAL; PERINEURAL at 11:46

## 2022-07-13 RX ADMIN — PREGABALIN 75 MG: 75 CAPSULE ORAL at 11:46

## 2022-07-13 RX ADMIN — FAMOTIDINE 20 MG: 20 TABLET ORAL at 11:46

## 2022-07-13 RX ADMIN — ROCURONIUM BROMIDE 20 MG: 10 INJECTION, SOLUTION INTRAVENOUS at 14:13

## 2022-07-13 RX ADMIN — ESMOLOL HYDROCHLORIDE 30 MG: 10 INJECTION, SOLUTION INTRAVENOUS at 13:28

## 2022-07-13 RX ADMIN — BUPIVACAINE HYDROCHLORIDE 50 ML: 2.5 INJECTION, SOLUTION EPIDURAL; INFILTRATION; INTRACAUDAL at 13:00

## 2022-07-13 RX ADMIN — Medication 100 MCG: at 13:04

## 2022-07-13 RX ADMIN — LIDOCAINE HYDROCHLORIDE 50 MG: 10 INJECTION, SOLUTION INFILTRATION; PERINEURAL at 12:55

## 2022-07-13 RX ADMIN — MELOXICAM 15 MG: 15 TABLET ORAL at 11:46

## 2022-07-13 RX ADMIN — FENTANYL CITRATE 50 MCG: 50 INJECTION, SOLUTION INTRAMUSCULAR; INTRAVENOUS at 16:49

## 2022-07-13 RX ADMIN — LEVOTHYROXINE SODIUM 88 MCG: 88 TABLET ORAL at 21:22

## 2022-07-13 RX ADMIN — SUGAMMADEX 200 MG: 100 INJECTION, SOLUTION INTRAVENOUS at 16:16

## 2022-07-13 RX ADMIN — INDOCYANINE GREEN AND WATER 12.5 MG: KIT at 15:28

## 2022-07-13 RX ADMIN — PROPOFOL 50 MG: 10 INJECTION, EMULSION INTRAVENOUS at 13:13

## 2022-07-13 RX ADMIN — HEPARIN SODIUM 5000 UNITS: 5000 INJECTION, SOLUTION INTRAVENOUS; SUBCUTANEOUS at 11:46

## 2022-07-13 RX ADMIN — ROCURONIUM BROMIDE 10 MG: 10 INJECTION, SOLUTION INTRAVENOUS at 15:50

## 2022-07-13 RX ADMIN — ONDANSETRON 4 MG: 2 INJECTION INTRAMUSCULAR; INTRAVENOUS at 16:16

## 2022-07-13 RX ADMIN — EPHEDRINE SULFATE 15 MG: 50 INJECTION INTRAVENOUS at 13:04

## 2022-07-13 RX ADMIN — ACETAMINOPHEN 1000 MG: 500 TABLET ORAL at 21:22

## 2022-07-13 RX ADMIN — SODIUM CHLORIDE, POTASSIUM CHLORIDE, SODIUM LACTATE AND CALCIUM CHLORIDE 9 ML/HR: 600; 310; 30; 20 INJECTION, SOLUTION INTRAVENOUS at 11:46

## 2022-07-13 RX ADMIN — ACETAMINOPHEN 1000 MG: 500 TABLET ORAL at 11:46

## 2022-07-13 RX ADMIN — ESMOLOL HYDROCHLORIDE 30 MG: 10 INJECTION, SOLUTION INTRAVENOUS at 12:55

## 2022-07-13 RX ADMIN — ROCURONIUM BROMIDE 50 MG: 10 INJECTION, SOLUTION INTRAVENOUS at 12:55

## 2022-07-13 RX ADMIN — PROPOFOL 150 MG: 10 INJECTION, EMULSION INTRAVENOUS at 12:55

## 2022-07-13 RX ADMIN — METOPROLOL TARTRATE 3 MG: 5 INJECTION INTRAVENOUS at 13:44

## 2022-07-13 NOTE — H&P
[facility] PREOPERATIVE HISTORY AND PHYSICAL       Chief complaint:  Rectal Cancer    Subjective:  Patient is a 76 y.o.female presents with history of rectal cancer.  She is here today for scheduled and consented ROBOTIC LOW ANTERIOR RESECTION , COLOSTOMY , CYSTOSCOPY WITH BILATERAL URETHERAL CATHETERS.      Review of Systems:  General ROS: negative for fever, chills, weakness, dizziness, headache, fatigue, weight changes  Cardiovascular ROS: no chest pain or dyspnea on exertion  Respiratory ROS: no cough, shortness of breath, or wheezing  GI ROS: no abdominal pain/discomfort, nausea or vomiting.  +chronic diarrhea (no change per patient)   ROS: no dysuria, hematuria or complaints  Skin ROS: no itching, rash or open wounds.      Allergies:   Allergies   Allergen Reactions   • Tetracycline Rash   Latex: no known allergy  Contrast Dye:  no known allergy      Home Meds    Medications Prior to Admission   Medication Sig Dispense Refill Last Dose   • CBD (cannabidiol) oral oil Take 1 drop by mouth Daily.   Past Week at Unknown time   • coenzyme Q10 100 MG capsule Take 100 mg by mouth Daily.   Past Week at Unknown time   • fidaxomicin (DIFICID) 200 MG tablet Take 200 mg by mouth 2 (Two) Times a Day.   7/13/2022 at 0700   • levothyroxine (SYNTHROID, LEVOTHROID) 88 MCG tablet Take 88 mcg by mouth Daily.   7/12/2022 at Unknown time   • magnesium chloride ER 64 MG DR tablet Take 128 mg by mouth Every Night.   7/12/2022 at Unknown time   • Probiotic Product (PROBIOTIC ADVANCED PO) Take 1 capsule by mouth Daily.   7/12/2022 at Unknown time   • sertraline (ZOLOFT) 25 MG tablet Take 25 mg by mouth Daily.   Past Week at Unknown time   • Turmeric 500 MG capsule Take 1,500 mg by mouth Daily.   Past Week at Unknown time   • Zinc 50 MG tablet Take 50 mg by mouth Daily.   Past Week at Unknown time     PMH:   Past Medical History:   Diagnosis Date   • Clostridium difficile infection 07/2022   • Colon polyps    • Coronary  "artery disease    • Depression    • Disease of thyroid gland    • Elevated cholesterol    • Hiatal hernia    • Rectal cancer (HCC)      PSH:    Past Surgical History:   Procedure Laterality Date   • CARDIAC CATHETERIZATION     • COLONOSCOPY  2022   • CORONARY ARTERY BYPASS GRAFT      2 VESSEL     Immunization History: pneumonia=no      Influenza=no       Covid-19=no        Tetanus= >10yrs     Social History:  Social History     Tobacco Use   • Smoking status: Former Smoker     Quit date:      Years since quittin.5   • Smokeless tobacco: Never Used   Substance Use Topics   • Alcohol use: Not Currently     Alcohol/week: 1.0 - 2.0 standard drink     Types: 1 - 2 Glasses of wine per week           Physical Exam:/85 (BP Location: Right arm, Patient Position: Lying)   Pulse 62   Temp 97.9 °F (36.6 °C) (Temporal)   Resp 18   Ht 154.9 cm (61\")   Wt 58.3 kg (128 lb 8.5 oz)   SpO2 95%   BMI 24.29 kg/m²       General Appearance:    Alert, cooperative, no distress, appears stated age   Head:    Normocephalic, without obvious abnormality, atraumatic   Lungs:     Clear to auscultation bilaterally, respirations unlabored    Heart: Regular rate and rhythm, S1 and S2 normal, no murmur, rub    or gallop    Abdomen:    Soft without tenderness  +bowel sounds   Breast Exam:    deferred   Genitalia:    deferred   Extremities:   Extremities normal, atraumatic, no cyanosis or edema   Skin:   Skin color, texture, turgor normal, no rashes or lesions   Neurologic:   Grossly intact     Results Review:   LABS:  Lab Results   Component Value Date    WBC 5.11 2022    HGB 13.0 2022    HCT 40.2 2022    MCV 85.9 2022     2022    NEUTROABS 2.18 2022    GLUCOSE 110 (H) 2022    BUN 17 2022    CREATININE 0.55 (L) 2022    EGFRIFNONA >60 2022    EGFRIFAFRI >60 2022     2022    K 4.0 2022     2022    CO2 27.0 2022    " MG 2.3 01/25/2022    CALCIUM 8.7 07/12/2022    ALBUMIN 4.10 07/12/2022    AST 12 07/12/2022    ALT 8 07/12/2022    BILITOT 0.3 07/12/2022       RADIOLOGY:  Imaging Results (Last 72 Hours)     ** No results found for the last 72 hours. **           Cancer Staging (if applicable):  Cancer Patient:  Yes; clinical stage IIA (cT3, cN0, cM0)    Impression:    RECTAL CANCER    Plan:   ROBOTIC LOW ANTERIOR RESECTION , COLOSTOMY , CYSTOSCOPY WITH BILATERAL URETHERAL CATHETERS    SELVIN West 7/13/2022 12:09 EDT

## 2022-07-13 NOTE — ANESTHESIA PROCEDURE NOTES
Airway  Urgency: elective    Date/Time: 7/13/2022 12:57 PM  Airway not difficult    General Information and Staff    Patient location during procedure: OR  CRNA/CAA: Charbel Graham CRNA    Indications and Patient Condition  Indications for airway management: airway protection    Preoxygenated: yes  MILS not maintained throughout  Mask difficulty assessment: 1 - vent by mask    Final Airway Details  Final airway type: endotracheal airway      Successful airway: ETT  Cuffed: yes   Successful intubation technique: direct laryngoscopy  Endotracheal tube insertion site: oral  Blade: Mike  Blade size: 3  ETT size (mm): 7.0  Cormack-Lehane Classification: grade I - full view of glottis  Placement verified by: chest auscultation and capnometry   Cuff volume (mL): 8  Measured from: lips  ETT/EBT  to lips (cm): 20  Number of attempts at approach: 1  Assessment: lips, teeth, and gum same as pre-op and atraumatic intubation    Additional Comments  Negative epigastric sounds, Breath sound equal bilaterally with symmetric chest rise and fall

## 2022-07-13 NOTE — NURSING NOTE
"Patient seen in pre-op for stoma marking, daughter at bedside.     Patient placed in sitting position with abdomen exposed.  Stoma site marked with 2 options, RUQ as #1 and LUQ as #2.       Folds, creases/scars & underwear line avoided. Rectus muscle identified.     Patient able to visualize stoma marking(s) and point to \"X\" with their finger.     WOC Nurse will continue to follow daily if an ostomy is required.     "

## 2022-07-13 NOTE — ANESTHESIA PROCEDURE NOTES
4 quadrant TAP blocks       Patient reassessed immediately prior to procedure    Patient location during procedure: OR  Start time: 7/13/2022 12:58 PM  Stop time: 7/13/2022 1:34 PM  Reason for block: at surgeon's request and post-op pain management  Performed by  CRNA/CAA: Aria Thomas CRNA  Preanesthetic Checklist  Completed: patient identified, IV checked, site marked, risks and benefits discussed, surgical consent, monitors and equipment checked, pre-op evaluation and timeout performed  Prep:  Pt Position: supine  Sterile barriers:cap, gloves, sterile barriers and mask  Prep: ChloraPrep  Patient monitoring: blood pressure monitoring, continuous pulse oximetry and EKG  Procedure    Sedation: no  Performed under: general  Guidance:ultrasound guided  Images:still images obtained, printed/placed on chart    Laterality:Bilateral  Block Type:TAP  Injection Technique:single-shot  Needle Type:short-bevel and echogenic  Needle Gauge:20 G  Resistance on Injection: none    Medications Used: dexamethasone sodium phosphate injection, 2 mg  bupivacaine PF (MARCAINE) 0.25 % injection, 50 mL  Med administered at 7/13/2022 1:00 PM      Medications  Preservative Free Saline:10ml  Comment:Block Injection:  LA dose divided between Right and Left block        Post Assessment  Injection Assessment: negative aspiration for heme, incremental injection and no paresthesia on injection  Patient Tolerance:comfortable throughout block  Complications:no  Additional Notes      Under Ultrasound guidance, a BBraun 4inch 360 degree needle was advanced with Normal Saline hydro dissection of tissue.  The Internal Oblique and Transversus Abdominus muscles where visualized.  At or before the aponeurosis of Internal Oblique, local anesthetic spread was visualized in the Transversus Abdominus Plane. Injection was made incrementally with aspiration every 5 mls.  There was no  intravascular injection,  injection pressure was normal, there was no  neural injection, and the procedure was completed without difficulty.  Thank You.

## 2022-07-13 NOTE — OP NOTE
Cystourethroscopy & Intraoperative Bilateral Catheter Placement Operative Report    Patient Name:  Helena Cardoso  YOB: 1945    Date of Surgery:  7/13/2022     Indications: 76-year-old female with diagnosis of rectal cancer undergoing robotic low anterior resection, colostomy with Dr. Henderson of colorectal surgery.  Urology consulted for intraoperative placement of ureteral identification catheter.  The risk benefits and alternatives to the procedure were discussed with patient she elects to proceed    Pre-op Diagnosis:   Rectal cancer       Post-Op Diagnosis Codes:  Rectal cancer    Procedure/CPT® Codes: 20339, 92554    1. CYSTOSCOPY BILATERAL URETERAL CATHETER/STENT INSERTION  2. MODIFIER 50, BILATERAL STENT PLACEMENT  3. TALAMANTES CATHETER INSERTION  CHANGE    Staff:  Surgeon(s):  Kristi Henderson MD Stark, Timothy, MD      Anesthesia: General with Block    Estimated Blood Loss: none    Implants:    Implant Name Type Inv. Item Serial No.  Lot No. LRB No. Used Action   DEV WND/CLS STRATAFIX SPIRALPDS PLS CT 2/0 15CM 26MM TISHA - [id number] Implant DEV WND/CLS STRATAFIX SPIRALPDS PLS CT 2/0 15CM 26MM TISHA  [employer]  DIV OF J AND J [id number]  1 Implanted       Specimen:          None        Findings:   1.  Normal urinary bladder without evidence of tumor stone or foreign body.  2.  Successful placement of bilateral 5 Vincentian ureteral catheter  3.  Successful placement of 16 Vincentian Talamantes catheter    Complications: None immediate    Description of Procedure:   The patient was identified in the preoperative holding area where informed consent was reviewed and signed. The patient was transported the operating room per anesthesia and placed supine on the operating table. Smooth endotracheal intubation was performed without issue after administration of general anesthesia. The patient was then placed in the dorsal lithotomy position where genitals were prepped and draped in the usual sterile fashion. A brief  timeout was performed identifying the correct patient procedure and laterality. Perioperative antibiotics were administered. All pressure points were padded.      Procedure began by inserting a 22 Andorran cystoscope atraumatically per the patient's urethra. Upon entering the bladder formal cystoscopy was performed identifying bilateral orthotopic ureteral orifices and no evidence of tumor, stone, foreign body. Attention was then turned to the right ureteral orifice. A 5 Fr open ended ureteral catheter was inserted into the distal ureter and passed up to the level right renal collecting system without difficulty.  10 mL ICG instilled through the ureteral catheter.  Attention was then turned to the left ureteral orifice.  A 5 Fr open ended ureteralcatheter was inserted into the distal ureter and passed up to the level left renal collecting system without difficulty.   10 mL ICG instilled through the ureteral catheter.      A 16F catheter was placed.  10 cc placed in the balloon.  The ureteral catheters were affixed to the Talamantes catheter.     Patient remained under general anesthesia.  The patient will continue scheduled procedure with colorectal service, Dr. Beatriz MD. Urology service available to assist in any way during the procedure.    Kevin Naranjo MD     Date: 7/13/2022  Time: 15:00 EDT

## 2022-07-13 NOTE — ANESTHESIA PREPROCEDURE EVALUATION
Anesthesia Evaluation     Patient summary reviewed and Nursing notes reviewed   no history of anesthetic complications:  NPO Solid Status: > 8 hours  NPO Liquid Status: > 2 hours           Airway   Mallampati: I  TM distance: >3 FB  Neck ROM: full  No difficulty expected  Dental    (+) lower dentures and upper dentures    Pulmonary - normal exam   (+) a smoker Former,   Cardiovascular - normal exam    ECG reviewed    (+) CAD, CABG >6 Months, hyperlipidemia,   (-) angina, FARIAS    ROS comment: Echo 03/2022  This result has an attachment that is not available.   •  Left Ventricle: The left ventricle is normal size. There is normal left   ventricular myocardial thickness and mass. The left ventricular systolic   function is borderline reduced. The LVEF as measured by biplane volume is   52%. The inferolateral, inferior and inferoseptal walls are hypokinetic.   The left ventricular filling pressure is normal.   •  Right Ventricle: Right ventricle size is normal. The right ventricular   systolic function is normal.   •  Valves: Mild aortic regurigtation.   •  Pericardium: No pericardial effusion.       Neuro/Psych  (+) psychiatric history Depression,    (-) seizures, TIA, CVA  GI/Hepatic/Renal/Endo    (+)  hiatal hernia,  thyroid problem hypothyroidism  (-) GERD, liver disease, no renal disease, diabetes    ROS Comment: c.diff    Musculoskeletal     Abdominal    Substance History      OB/GYN          Other      history of cancer (rectal cancer)                  Anesthesia Plan    ASA 3     general with block     intravenous induction     Anesthetic plan, risks, benefits, and alternatives have been provided, discussed and informed consent has been obtained with: patient.    Plan discussed with CRNA.        CODE STATUS:

## 2022-07-13 NOTE — OP NOTE
[facility]  OPERATIVE REPORT      Helena Cardoso  7/13/2022    Pre-op Diagnosis:   Locally advanced rectal cancer      Post-op Diagnosis:    Locally advanced rectal cancer    Procedure(s):  ROBOTIC LOW ANTERIOR RESECTION   COLOSTOMY   CYSTOSCOPY WITH BILATERAL URETHERAL CATHETERS      Surgeon(s):  Kristi Henderson MD Stark, Timothy, MD    Anesthesia: General with Block    Staff:   Circulator: Nguyen Villasenor RN; Bradly Nash RN  Scrub Person: Rosario Garcia; Val Fan; Bib Deluna  Nursing Assistant: Shikha Sapp  Assistant: Dread Lee PA    Assistant: Dread Lee PA was responsible for performing the following activities: Retraction, Suction, Irrigation, Suturing, Closing, Placing Dressing and Held/Positioned Camera and their skilled assistance was necessary for the success of this case.     Synoptic Findings:  Intent of Procedure (if related to known cancer diagnosis): This procedure was performed with curative intent related to a known cancer diagnosis.  Synoptic portion: The indication for total mesorectum excision was low rectal tumor with curative intent.      Estimated Blood Loss: 75 mL    Urine Voided: 200 mL    Specimens:                Specimens     ID Source Type Tests Collected By Collected At Frozen?    A Large Intestine, Sigmoid Colon Tissue · TISSUE PATHOLOGY EXAM   Kristi Henderson MD 7/13/22 1611     Description: sigmoid colon and rectum    This specimen was not marked as sent.                Drains:   Urethral Catheter Silicone 16 Fr. (Active)       Findings: Bulky tumor in the upper pelvis.  No evidence of metastatic disease.    Complications: None.    Procedure in Detail:     Lithotomy position.    Cystoscopy and bilateral ureteral catheters with isocyanate green dye will be dictated separately by Dr. Naranjo.        Operative site was prepped and draped in the usual sterile fashion.    GelPort was placed using open technique at the level  of the left mid abdomen at the site marked by the stoma nurses preoperatively.  There was no complications upon entering the abdomen.    Abdominal exploration was performed and there is no evidence of metastatic disease.      In the right abdomen, one 5 mm port, 4 8 mm ports and one 12 mm port was placed under direct visualization.  The patient was placed in Trendelenburg position to 28 degrees and 9 degrees right side tilt.  The robot was docked.  There were pelvic adhesions of the small bowel to uterus.  These were taken down sharply.  The small bowel was placed in the right upper quadrant.      Pathology noted in the rectosigmoid colon at the level of the peritoneal reflection. Sigmoid was freely mobile without adhesions to the pelvic organs.  The line of Toldt was then incised.  There was some bleeding in this area near the left ureter.  The left ureter lit up nicely with ICG.  Hemostasis was achieved here with the vessel seal device.  It appeared to be coming from the backside of the sigmoid mesentery.  The ureteral catheter was moved back and forth and also inspected with ICG and there was no evidence of ureteral injury.    The left ureter was  kept out of the plane of dissection at all times.     The ANA pedicle was isolated and divided using the vessel seal device.  Total mesorectal dissection was performed to the level of the pelvic floor.      Isocyanate green was used to confirm bright fluorescence in the descending colon conduit and the residual rectum.  The descending colon mesentery was transected.    Rectum transected with 3 firings of the sure form 45 stapler, 3 green loads at the pelvic floor.  I oversewed the cuff with a 2 oh strata fix.    Isocyanate green was used to confirm good fluorescence at the descending colon conduit.      A 10 Lao flat HERACLIO drain was placed in the pelvis.  The pelvis was hemostatic.  This was brought out through one of the port sites.  The robot was undocked.  The 12 mm  port was closed with the needle close device.    Abdomen was inspected for hemostasis.  Achieved.    Abdomen was inspected for other pathology. None noted.     The uterus and adenxal structures appeared normal.    The staple line on the specimen was grasped and brought up through the GelPort.  The abdomen was desufflated.  The fascia had to be enlarged to accommodate the rectal specimen which was bulky.    Fascia closed with #1 non-looped PDS x2 in an interrupted fashion around the colostomy.  Wounds were irrigated with chlorhexidine solution.  Skin closed with monocryl. Exofin dressing applied.     A colostomy was matured using Antonieta technique.  It was pink and healthy-appearing and brooked at the end of the case.    Ureteral catheters were removed intact.    All counts were announced as correct at the end of the case. Patient tolerated procedure well, extubated in the operating room and transferred to recovery room in stable condition.         Kristi Henderson MD     Date: 7/13/2022  Time: 16:31 EDT

## 2022-07-13 NOTE — H&P
Patient Name: Helena Cardoso  MRN: [id number]  : 1945  DOS: 2022    Attending: Kristi Henderson MD    Primary Care Provider: Jay Pelaez DO      Chief complaint:  Rectal cancer    Subjective   Patient is a pleasant 76 y.o. female presented for scheduled surgery by Dr. Henderson. She anticipates low anterior resection, colostomy and urethral catheters today.  She states she has had a 7-month history of changes in bowel habits.  She reports having been treated for C. difficile toxin on 2 occasions.  She reports occasional blood or mucus in her stool.  She reports a 30 pound weight loss over the last 7 months.  She was found to have rectal cancer.  She has seen by both oncology and radiation oncology.    When seen preop she is doing well.  She denies pain or other complaints.  She denies nausea, shortness of breath or chest pain.  No history of DVT or PE.    Allergies:  Allergies   Allergen Reactions   • Tetracycline Rash       Meds:  Medications Prior to Admission   Medication Sig Dispense Refill Last Dose   • CBD (cannabidiol) oral oil Take 1 drop by mouth Daily.   Past Week at Unknown time   • coenzyme Q10 100 MG capsule Take 100 mg by mouth Daily.   Past Week at Unknown time   • fidaxomicin (DIFICID) 200 MG tablet Take 200 mg by mouth 2 (Two) Times a Day.   2022 at 0700   • levothyroxine (SYNTHROID, LEVOTHROID) 88 MCG tablet Take 88 mcg by mouth Daily.   2022 at Unknown time   • magnesium chloride ER 64 MG DR tablet Take 128 mg by mouth Every Night.   2022 at Unknown time   • Probiotic Product (PROBIOTIC ADVANCED PO) Take 1 capsule by mouth Daily.   2022 at Unknown time   • sertraline (ZOLOFT) 25 MG tablet Take 25 mg by mouth Daily.   Past Week at Unknown time   • Turmeric 500 MG capsule Take 1,500 mg by mouth Daily.   Past Week at Unknown time   • Zinc 50 MG tablet Take 50 mg by mouth Daily.   Past Week at Unknown time         History:   Past Medical History:   Diagnosis Date   •  "Clostridium difficile infection 2022   • Colon polyps    • Coronary artery disease    • Depression    • Disease of thyroid gland    • Elevated cholesterol    • Hiatal hernia    • Rectal cancer (HCC)      Past Surgical History:   Procedure Laterality Date   • CARDIAC CATHETERIZATION     • COLONOSCOPY  2022   • CORONARY ARTERY BYPASS GRAFT      2 VESSEL     Family History   Problem Relation Age of Onset   • Colon cancer Sister      Social History     Tobacco Use   • Smoking status: Former Smoker     Quit date:      Years since quittin.5   • Smokeless tobacco: Never Used   Vaping Use   • Vaping Use: Never used   Substance Use Topics   • Alcohol use: Not Currently     Alcohol/week: 1.0 - 2.0 standard drink     Types: 1 - 2 Glasses of wine per week   She lives alone and has 5 children.  She is retired .    Review of Systems  All systems were reviewed and negative except for:  Respiratory: positive for  shortness of air  Gastrointestinal: positive for  change in bowel habits    Vital Signs  /85 (BP Location: Right arm, Patient Position: Lying)   Pulse 62   Temp 97.9 °F (36.6 °C) (Temporal)   Resp 18   Ht 154.9 cm (61\")   Wt 58.3 kg (128 lb 8.5 oz)   SpO2 95%   BMI 24.29 kg/m²     Physical Exam:    General Appearance:    Alert, cooperative, in no acute distress   Head:    Normocephalic, without obvious abnormality, atraumatic   Eyes:            Lids and lashes normal, conjunctivae and sclerae normal, no   icterus, no pallor, corneas clear,    Ears:    Ears appear intact with no abnormalities noted   Throat:   No oral lesions, no thrush, oral mucosa moist   Neck:   No adenopathy, supple, trachea midline, no thyromegaly    Lungs:     Clear to auscultation,respirations regular, even and unlabored    Heart:    Regular rhythm and normal rate, normal S1 and S2, 2/6 murmur, no gallop   Abdomen:     Normal bowel sounds, nontender   Genitalia:    Deferred   Extremities:   Moves all " extremities well, no edema, no cyanosis, no  redness   Pulses:   Pulses palpable and equal bilaterally   Skin:   No bleeding, bruising or rash   Neurologic:   Cranial nerves 2 - 12 grossly intact.         I reviewed the patient's new clinical results.       Results from last 7 days   Lab Units 07/12/22  0831   WBC 10*3/mm3 5.11   HEMOGLOBIN g/dL 13.0   HEMATOCRIT % 40.2   PLATELETS 10*3/mm3 288     Results from last 7 days   Lab Units 07/12/22  0831   SODIUM mmol/L 142   POTASSIUM mmol/L 4.0   CHLORIDE mmol/L 106   CO2 mmol/L 27.0   BUN mg/dL 17   CREATININE mg/dL 0.55*   CALCIUM mg/dL 8.7   BILIRUBIN mg/dL 0.3   ALK PHOS U/L 70   ALT (SGPT) U/L 8   AST (SGOT) U/L 12   GLUCOSE mg/dL 110*     Lab Results   Component Value Date    HGBA1C 5.50 07/12/2022         Assessment and Plan:     Rectal cancer (HCC)    Hypothyroid      Plan  1. Ambulation  2. Pain control-prns   3. IS-encourage  4. DVT proph- Mechanical, subcutaneous Lovenox  5. Bowel regimen  6. Resume home medications per Dr. Henderson  7. Monitor post-op labs  8. DC planning   9. Diet, Clears, advance diet as tolerated.  IVF initially, monitor volume status.    Hypothyroid  -Continue home Synthroid      VARUN Chavez  07/13/22  16:29 EDT

## 2022-07-13 NOTE — ANESTHESIA POSTPROCEDURE EVALUATION
Patient: Helena Cardoso    Procedure Summary     Date: 07/13/22 [facility] / [facility]:  [facility]  [facility]    Anesthesia Start: 1249 Anesthesia Stop: 1633    Procedures:       ROBOTIC LOW ANTERIOR RESECTION , COLOSTOMY , CYSTOSCOPY WITH BILATERAL URETHERAL CATHETERS (N/A Abdomen)      CYSTOSCOPY URETERAL CATHETER/STENT INSERTION (Bilateral ) Diagnosis:     Surgeons: Kristi Henderson MD Provider: Nivia Wilkinson MD    Anesthesia Type: general with block ASA Status: 3          Anesthesia Type: general with block    Vitals  Vitals Value Taken Time   BP     Temp     Pulse 64 07/13/22 1632   Resp     SpO2 95 % 07/13/22 1632   Vitals shown include unvalidated device data.        Post Anesthesia Care and Evaluation    Patient location during evaluation: PACU  Patient participation: complete - patient participated  Level of consciousness: awake and alert  Pain management: adequate    Airway patency: patent  Anesthetic complications: No anesthetic complications  PONV Status: none  Cardiovascular status: hemodynamically stable and acceptable  Respiratory status: nonlabored ventilation, acceptable and nasal cannula  Hydration status: acceptable

## 2022-07-14 PROBLEM — Z90.49 S/P COLON RESECTION: Status: ACTIVE | Noted: 2022-07-14

## 2022-07-14 LAB
ANION GAP SERPL CALCULATED.3IONS-SCNC: 7 MMOL/L (ref 5–15)
BASOPHILS # BLD AUTO: 0.01 10*3/MM3 (ref 0–0.2)
BASOPHILS NFR BLD AUTO: 0.1 % (ref 0–1.5)
BUN SERPL-MCNC: 10 MG/DL (ref 8–23)
BUN/CREAT SERPL: 16.7 (ref 7–25)
CALCIUM SPEC-SCNC: 8.8 MG/DL (ref 8.6–10.5)
CHLORIDE SERPL-SCNC: 106 MMOL/L (ref 98–107)
CO2 SERPL-SCNC: 26 MMOL/L (ref 22–29)
CREAT SERPL-MCNC: 0.6 MG/DL (ref 0.57–1)
DEPRECATED RDW RBC AUTO: 44 FL (ref 37–54)
EGFRCR SERPLBLD CKD-EPI 2021: 92.6 ML/MIN/1.73
EOSINOPHIL # BLD AUTO: 0 10*3/MM3 (ref 0–0.4)
EOSINOPHIL NFR BLD AUTO: 0 % (ref 0.3–6.2)
ERYTHROCYTE [DISTWIDTH] IN BLOOD BY AUTOMATED COUNT: 13.8 % (ref 12.3–15.4)
GLUCOSE BLDC GLUCOMTR-MCNC: 107 MG/DL (ref 70–130)
GLUCOSE BLDC GLUCOMTR-MCNC: 126 MG/DL (ref 70–130)
GLUCOSE BLDC GLUCOMTR-MCNC: 143 MG/DL (ref 70–130)
GLUCOSE BLDC GLUCOMTR-MCNC: 98 MG/DL (ref 70–130)
GLUCOSE SERPL-MCNC: 158 MG/DL (ref 65–99)
HCT VFR BLD AUTO: 32.2 % (ref 34–46.6)
HGB BLD-MCNC: 10.1 G/DL (ref 12–15.9)
IMM GRANULOCYTES # BLD AUTO: 0.03 10*3/MM3 (ref 0–0.05)
IMM GRANULOCYTES NFR BLD AUTO: 0.4 % (ref 0–0.5)
LYMPHOCYTES # BLD AUTO: 0.62 10*3/MM3 (ref 0.7–3.1)
LYMPHOCYTES NFR BLD AUTO: 8.5 % (ref 19.6–45.3)
MAGNESIUM SERPL-MCNC: 1.9 MG/DL (ref 1.6–2.4)
MCH RBC QN AUTO: 27.3 PG (ref 26.6–33)
MCHC RBC AUTO-ENTMCNC: 31.4 G/DL (ref 31.5–35.7)
MCV RBC AUTO: 87 FL (ref 79–97)
MONOCYTES # BLD AUTO: 0.48 10*3/MM3 (ref 0.1–0.9)
MONOCYTES NFR BLD AUTO: 6.6 % (ref 5–12)
NEUTROPHILS NFR BLD AUTO: 6.14 10*3/MM3 (ref 1.7–7)
NEUTROPHILS NFR BLD AUTO: 84.4 % (ref 42.7–76)
NRBC BLD AUTO-RTO: 0 /100 WBC (ref 0–0.2)
PLATELET # BLD AUTO: 229 10*3/MM3 (ref 140–450)
PMV BLD AUTO: 10.2 FL (ref 6–12)
POTASSIUM SERPL-SCNC: 4 MMOL/L (ref 3.5–5.2)
RBC # BLD AUTO: 3.7 10*6/MM3 (ref 3.77–5.28)
SODIUM SERPL-SCNC: 139 MMOL/L (ref 136–145)
WBC NRBC COR # BLD: 7.28 10*3/MM3 (ref 3.4–10.8)

## 2022-07-14 PROCEDURE — 80048 BASIC METABOLIC PNL TOTAL CA: CPT | Performed by: COLON & RECTAL SURGERY

## 2022-07-14 PROCEDURE — 85025 COMPLETE CBC W/AUTO DIFF WBC: CPT | Performed by: COLON & RECTAL SURGERY

## 2022-07-14 PROCEDURE — 25010000002 MORPHINE PER 10 MG: Performed by: COLON & RECTAL SURGERY

## 2022-07-14 PROCEDURE — 83735 ASSAY OF MAGNESIUM: CPT | Performed by: COLON & RECTAL SURGERY

## 2022-07-14 PROCEDURE — 82962 GLUCOSE BLOOD TEST: CPT

## 2022-07-14 PROCEDURE — 25010000002 ENOXAPARIN PER 10 MG

## 2022-07-14 RX ADMIN — VANCOMYCIN HYDROCHLORIDE 125 MG: 125 CAPSULE ORAL at 23:01

## 2022-07-14 RX ADMIN — POTASSIUM CHLORIDE, DEXTROSE MONOHYDRATE AND SODIUM CHLORIDE 100 ML/HR: 150; 5; 450 INJECTION, SOLUTION INTRAVENOUS at 14:37

## 2022-07-14 RX ADMIN — ACETAMINOPHEN 1000 MG: 500 TABLET ORAL at 13:12

## 2022-07-14 RX ADMIN — VANCOMYCIN HYDROCHLORIDE 125 MG: 125 CAPSULE ORAL at 16:31

## 2022-07-14 RX ADMIN — ENOXAPARIN SODIUM 40 MG: 40 INJECTION SUBCUTANEOUS at 07:49

## 2022-07-14 RX ADMIN — DIAZEPAM 5 MG: 5 TABLET ORAL at 20:09

## 2022-07-14 RX ADMIN — DIAZEPAM 5 MG: 5 TABLET ORAL at 07:46

## 2022-07-14 RX ADMIN — VANCOMYCIN HYDROCHLORIDE 125 MG: 125 CAPSULE ORAL at 01:10

## 2022-07-14 RX ADMIN — TRAMADOL HYDROCHLORIDE 50 MG: 50 TABLET, COATED ORAL at 11:24

## 2022-07-14 RX ADMIN — TRAMADOL HYDROCHLORIDE 50 MG: 50 TABLET, COATED ORAL at 20:17

## 2022-07-14 RX ADMIN — SERTRALINE HYDROCHLORIDE 25 MG: 50 TABLET ORAL at 20:11

## 2022-07-14 RX ADMIN — LEVOTHYROXINE SODIUM 88 MCG: 88 TABLET ORAL at 07:48

## 2022-07-14 RX ADMIN — VANCOMYCIN HYDROCHLORIDE 125 MG: 125 CAPSULE ORAL at 05:24

## 2022-07-14 RX ADMIN — ALVIMOPAN 12 MG: 12 CAPSULE ORAL at 20:09

## 2022-07-14 RX ADMIN — VANCOMYCIN HYDROCHLORIDE 125 MG: 125 CAPSULE ORAL at 11:24

## 2022-07-14 RX ADMIN — ACETAMINOPHEN 1000 MG: 500 TABLET ORAL at 01:10

## 2022-07-14 RX ADMIN — ACETAMINOPHEN 1000 MG: 500 TABLET ORAL at 07:46

## 2022-07-14 RX ADMIN — ALVIMOPAN 12 MG: 12 CAPSULE ORAL at 07:48

## 2022-07-14 NOTE — PAYOR COMM NOTE
"Helena Amaro (76 y.o. Female)     Karlie RN -[phone], fax [phone]    Request for IP for scheduled IP surgery            Date of Birth   1945    Social Security Number       Address   [address] [address]    Home Phone   [phone]    MRN   [id number]       Yazidism   Rastafarian    Marital Status                               Admission Date   22    Admission Type   Elective    Admitting Provider   Kristi Henderson MD    Attending Provider   Kristi Henderson MD    Department, Room/Bed   [facility] 5G, S556/1       Discharge Date       Discharge Disposition       Discharge Destination                               Attending Provider: Kristi Henderson MD    Allergies: Tetracycline    Isolation: Spore   Infection: C.difficile (22)   Code Status: CPR   Advance Care Planning Activity    Ht: 154.9 cm (61\")   Wt: 58.3 kg (128 lb 8.5 oz)    Admission Cmt: None   Principal Problem: None                Active Insurance as of 2022     Primary Coverage     Payor Plan Insurance Group Employer/Plan Group    [facility] MEDICARE REPLACEMENT [employer]LL[employer] MEDICARE REPLACEMENT      Payor Plan Address Payor Plan Phone Number Payor Plan Fax Number Effective Dates    [address] [address] [phone]  2022 - None Entered    [facility] [id number]       Subscriber Name Subscriber Birth Date Member ID       HELENA AMARO 1945 [id number]                 Emergency Contacts      (Rel.) Home Phone Work Phone Mobile Phone    PHYLLIS AMARO (Daughter) -- -- [phone]    THONYCHICADONTAE (Daughter) -- -- [phone]               History & Physical      Shanique Thomson APRN at 22 1626          Patient Name: Helena Amaro  MRN: [id number]  : 1945  DOS: 2022    Attending: Kristi Henderson MD    Primary Care Provider: Jay Pelaez DO      Chief complaint:  Rectal cancer    Subjective   Patient is a pleasant 76 " y.o. female presented for scheduled surgery by Dr. Henderson. She anticipates low anterior resection, colostomy and urethral catheters today.  She states she has had a 7-month history of changes in bowel habits.  She reports having been treated for C. difficile toxin on 2 occasions.  She reports occasional blood or mucus in her stool.  She reports a 30 pound weight loss over the last 7 months.  She was found to have rectal cancer.  She has seen by both oncology and radiation oncology.    When seen preop she is doing well.  She denies pain or other complaints.  She denies nausea, shortness of breath or chest pain.  No history of DVT or PE.    Allergies:  Allergies   Allergen Reactions   • Tetracycline Rash       Meds:  Medications Prior to Admission   Medication Sig Dispense Refill Last Dose   • CBD (cannabidiol) oral oil Take 1 drop by mouth Daily.   Past Week at Unknown time   • coenzyme Q10 100 MG capsule Take 100 mg by mouth Daily.   Past Week at Unknown time   • fidaxomicin (DIFICID) 200 MG tablet Take 200 mg by mouth 2 (Two) Times a Day.   7/13/2022 at 0700   • levothyroxine (SYNTHROID, LEVOTHROID) 88 MCG tablet Take 88 mcg by mouth Daily.   7/12/2022 at Unknown time   • magnesium chloride ER 64 MG DR tablet Take 128 mg by mouth Every Night.   7/12/2022 at Unknown time   • Probiotic Product (PROBIOTIC ADVANCED PO) Take 1 capsule by mouth Daily.   7/12/2022 at Unknown time   • sertraline (ZOLOFT) 25 MG tablet Take 25 mg by mouth Daily.   Past Week at Unknown time   • Turmeric 500 MG capsule Take 1,500 mg by mouth Daily.   Past Week at Unknown time   • Zinc 50 MG tablet Take 50 mg by mouth Daily.   Past Week at Unknown time         History:   Past Medical History:   Diagnosis Date   • Clostridium difficile infection 07/2022   • Colon polyps    • Coronary artery disease    • Depression    • Disease of thyroid gland    • Elevated cholesterol    • Hiatal hernia    • Rectal cancer (HCC)      Past Surgical History:  "  Procedure Laterality Date   • CARDIAC CATHETERIZATION     • COLONOSCOPY  2022   • CORONARY ARTERY BYPASS GRAFT      2 VESSEL     Family History   Problem Relation Age of Onset   • Colon cancer Sister      Social History     Tobacco Use   • Smoking status: Former Smoker     Quit date:      Years since quittin.5   • Smokeless tobacco: Never Used   Vaping Use   • Vaping Use: Never used   Substance Use Topics   • Alcohol use: Not Currently     Alcohol/week: 1.0 - 2.0 standard drink     Types: 1 - 2 Glasses of wine per week   She lives alone and has 5 children.  She is retired .    Review of Systems  All systems were reviewed and negative except for:  Respiratory: positive for  shortness of air  Gastrointestinal: positive for  change in bowel habits    Vital Signs  /85 (BP Location: Right arm, Patient Position: Lying)   Pulse 62   Temp 97.9 °F (36.6 °C) (Temporal)   Resp 18   Ht 154.9 cm (61\")   Wt 58.3 kg (128 lb 8.5 oz)   SpO2 95%   BMI 24.29 kg/m²     Physical Exam:    General Appearance:    Alert, cooperative, in no acute distress   Head:    Normocephalic, without obvious abnormality, atraumatic   Eyes:            Lids and lashes normal, conjunctivae and sclerae normal, no   icterus, no pallor, corneas clear,    Ears:    Ears appear intact with no abnormalities noted   Throat:   No oral lesions, no thrush, oral mucosa moist   Neck:   No adenopathy, supple, trachea midline, no thyromegaly    Lungs:     Clear to auscultation,respirations regular, even and unlabored    Heart:    Regular rhythm and normal rate, normal S1 and S2, 2/6 murmur, no gallop   Abdomen:     Normal bowel sounds, nontender   Genitalia:    Deferred   Extremities:   Moves all extremities well, no edema, no cyanosis, no  redness   Pulses:   Pulses palpable and equal bilaterally   Skin:   No bleeding, bruising or rash   Neurologic:   Cranial nerves 2 - 12 grossly intact.         I reviewed the patient's new " clinical results.       Results from last 7 days   Lab Units 07/12/22  0831   WBC 10*3/mm3 5.11   HEMOGLOBIN g/dL 13.0   HEMATOCRIT % 40.2   PLATELETS 10*3/mm3 288     Results from last 7 days   Lab Units 07/12/22  0831   SODIUM mmol/L 142   POTASSIUM mmol/L 4.0   CHLORIDE mmol/L 106   CO2 mmol/L 27.0   BUN mg/dL 17   CREATININE mg/dL 0.55*   CALCIUM mg/dL 8.7   BILIRUBIN mg/dL 0.3   ALK PHOS U/L 70   ALT (SGPT) U/L 8   AST (SGOT) U/L 12   GLUCOSE mg/dL 110*     Lab Results   Component Value Date    HGBA1C 5.50 07/12/2022         Assessment and Plan:     Rectal cancer (HCC)    Hypothyroid      Plan  1. Ambulation  2. Pain control-prns   3. IS-encourage  4. DVT proph- Mechanical, subcutaneous Lovenox  5. Bowel regimen  6. Resume home medications per Dr. Henderson  7. Monitor post-op labs  8. DC planning   9. Diet, Clears, advance diet as tolerated.  IVF initially, monitor volume status.    Hypothyroid  -Continue home Synthroid      VARUN Chavez  07/13/22  16:29 EDT    Electronically signed by Shanique Thomson APRN at 07/13/22 1634     Penelope Santos PA at 07/13/22 1057     Attestation signed by Kristi Henderson MD at 07/13/22 1243    I have reviewed this documentation and agree.                  [facility] PREOPERATIVE HISTORY AND PHYSICAL       Chief complaint:  Rectal Cancer    Subjective:  Patient is a 76 y.o.female presents with history of rectal cancer.  She is here today for scheduled and consented ROBOTIC LOW ANTERIOR RESECTION , COLOSTOMY , CYSTOSCOPY WITH BILATERAL URETHERAL CATHETERS.      Review of Systems:  General ROS: negative for fever, chills, weakness, dizziness, headache, fatigue, weight changes  Cardiovascular ROS: no chest pain or dyspnea on exertion  Respiratory ROS: no cough, shortness of breath, or wheezing  GI ROS: no abdominal pain/discomfort, nausea or vomiting.  +chronic diarrhea (no change per patient)   ROS: no dysuria, hematuria or complaints  Skin ROS: no itching,  rash or open wounds.      Allergies:   Allergies   Allergen Reactions   • Tetracycline Rash   Latex: no known allergy  Contrast Dye:  no known allergy      Home Meds    Medications Prior to Admission   Medication Sig Dispense Refill Last Dose   • CBD (cannabidiol) oral oil Take 1 drop by mouth Daily.   Past Week at Unknown time   • coenzyme Q10 100 MG capsule Take 100 mg by mouth Daily.   Past Week at Unknown time   • fidaxomicin (DIFICID) 200 MG tablet Take 200 mg by mouth 2 (Two) Times a Day.   2022 at 0700   • levothyroxine (SYNTHROID, LEVOTHROID) 88 MCG tablet Take 88 mcg by mouth Daily.   2022 at Unknown time   • magnesium chloride ER 64 MG DR tablet Take 128 mg by mouth Every Night.   2022 at Unknown time   • Probiotic Product (PROBIOTIC ADVANCED PO) Take 1 capsule by mouth Daily.   2022 at Unknown time   • sertraline (ZOLOFT) 25 MG tablet Take 25 mg by mouth Daily.   Past Week at Unknown time   • Turmeric 500 MG capsule Take 1,500 mg by mouth Daily.   Past Week at Unknown time   • Zinc 50 MG tablet Take 50 mg by mouth Daily.   Past Week at Unknown time     PMH:   Past Medical History:   Diagnosis Date   • Clostridium difficile infection 2022   • Colon polyps    • Coronary artery disease    • Depression    • Disease of thyroid gland    • Elevated cholesterol    • Hiatal hernia    • Rectal cancer (HCC)      PSH:    Past Surgical History:   Procedure Laterality Date   • CARDIAC CATHETERIZATION     • COLONOSCOPY  2022   • CORONARY ARTERY BYPASS GRAFT      2 VESSEL     Immunization History: pneumonia=no      Influenza=no       Covid-19=no        Tetanus= >10yrs     Social History:  Social History     Tobacco Use   • Smoking status: Former Smoker     Quit date:      Years since quittin.5   • Smokeless tobacco: Never Used   Substance Use Topics   • Alcohol use: Not Currently     Alcohol/week: 1.0 - 2.0 standard drink     Types: 1 - 2 Glasses of wine per week  "          Physical Exam:/85 (BP Location: Right arm, Patient Position: Lying)   Pulse 62   Temp 97.9 °F (36.6 °C) (Temporal)   Resp 18   Ht 154.9 cm (61\")   Wt 58.3 kg (128 lb 8.5 oz)   SpO2 95%   BMI 24.29 kg/m²       General Appearance:    Alert, cooperative, no distress, appears stated age   Head:    Normocephalic, without obvious abnormality, atraumatic   Lungs:     Clear to auscultation bilaterally, respirations unlabored    Heart: Regular rate and rhythm, S1 and S2 normal, no murmur, rub    or gallop    Abdomen:    Soft without tenderness  +bowel sounds   Breast Exam:    deferred   Genitalia:    deferred   Extremities:   Extremities normal, atraumatic, no cyanosis or edema   Skin:   Skin color, texture, turgor normal, no rashes or lesions   Neurologic:   Grossly intact     Results Review:   LABS:  Lab Results   Component Value Date    WBC 5.11 07/12/2022    HGB 13.0 07/12/2022    HCT 40.2 07/12/2022    MCV 85.9 07/12/2022     07/12/2022    NEUTROABS 2.18 01/25/2022    GLUCOSE 110 (H) 07/12/2022    BUN 17 07/12/2022    CREATININE 0.55 (L) 07/12/2022    EGFRIFNONA >60 06/07/2022    EGFRIFAFRI >60 06/07/2022     07/12/2022    K 4.0 07/12/2022     07/12/2022    CO2 27.0 07/12/2022    MG 2.3 01/25/2022    CALCIUM 8.7 07/12/2022    ALBUMIN 4.10 07/12/2022    AST 12 07/12/2022    ALT 8 07/12/2022    BILITOT 0.3 07/12/2022       RADIOLOGY:  Imaging Results (Last 72 Hours)     ** No results found for the last 72 hours. **           Cancer Staging (if applicable):  Cancer Patient:  Yes; clinical stage IIA (cT3, cN0, cM0)    Impression:    RECTAL CANCER    Plan:   ROBOTIC LOW ANTERIOR RESECTION , COLOSTOMY , CYSTOSCOPY WITH BILATERAL URETHERAL CATHETERS    SELVIN West 7/13/2022 12:09 EDT          Electronically signed by Kristi Henderson MD at 07/13/22 1243         Lab Results (last 24 hours)     Procedure Component Value Units Date/Time    Tissue Pathology Exam [[id number]] " Collected: 07/13/22 1611    Specimen: Tissue from Large Intestine, Sigmoid Colon Updated: 07/14/22 0830    POC Glucose Once [[id number]]  (Normal) Collected: 07/14/22 0724    Specimen: Blood Updated: 07/14/22 0725     Glucose 107 mg/dL      Comment: Meter: [id number] : [id number] Chawlaamanuel Zaldivartany       Basic Metabolic Panel [[id number]]  (Abnormal) Collected: 07/14/22 0325    Specimen: Blood Updated: 07/14/22 0448     Glucose 158 mg/dL      BUN 10 mg/dL      Creatinine 0.60 mg/dL      Sodium 139 mmol/L      Potassium 4.0 mmol/L      Chloride 106 mmol/L      CO2 26.0 mmol/L      Calcium 8.8 mg/dL      BUN/Creatinine Ratio 16.7     Anion Gap 7.0 mmol/L      eGFR 92.6 mL/min/1.73      Comment: National Kidney Foundation and American Society of Nephrology (ASN) Task Force recommended calculation based on the Chronic Kidney Disease Epidemiology Collaboration (CKD-EPI) equation refit without adjustment for race.       Narrative:      GFR Normal >60  Chronic Kidney Disease <60  Kidney Failure <15      Magnesium [[id number]]  (Normal) Collected: 07/14/22 0325    Specimen: Blood Updated: 07/14/22 0448     Magnesium 1.9 mg/dL     CBC & Differential [[id number]]  (Abnormal) Collected: 07/14/22 0325    Specimen: Blood Updated: 07/14/22 0420    Narrative:      The following orders were created for panel order CBC & Differential.  Procedure                               Abnormality         Status                     ---------                               -----------         ------                     CBC Auto Differential[[id number]]        Abnormal            Final result                 Please view results for these tests on the individual orders.    CBC Auto Differential [[id number]]  (Abnormal) Collected: 07/14/22 0325    Specimen: Blood Updated: 07/14/22 0420     WBC 7.28 10*3/mm3      RBC 3.70 10*6/mm3      Hemoglobin 10.1 g/dL      Hematocrit 32.2 %      MCV 87.0 fL      MCH 27.3 pg      MCHC 31.4 g/dL      RDW 13.8 %       RDW-SD 44.0 fl      MPV 10.2 fL      Platelets 229 10*3/mm3      Neutrophil % 84.4 %      Lymphocyte % 8.5 %      Monocyte % 6.6 %      Eosinophil % 0.0 %      Basophil % 0.1 %      Immature Grans % 0.4 %      Neutrophils, Absolute 6.14 10*3/mm3      Lymphocytes, Absolute 0.62 10*3/mm3      Monocytes, Absolute 0.48 10*3/mm3      Eosinophils, Absolute 0.00 10*3/mm3      Basophils, Absolute 0.01 10*3/mm3      Immature Grans, Absolute 0.03 10*3/mm3      nRBC 0.0 /100 WBC     POC Glucose Once [[id number]]  (Abnormal) Collected: 07/13/22 2049    Specimen: Blood Updated: 07/13/22 2050     Glucose 166 mg/dL      Comment: Meter: [id number] : [id number] Rosana Arroyo       Hemoglobin & Hematocrit, Blood [[id number]]  (Abnormal) Collected: 07/13/22 1648    Specimen: Blood from Arm, Right Updated: 07/13/22 1710     Hemoglobin 10.6 g/dL      Hematocrit 33.1 %         Imaging Results (Last 24 Hours)     ** No results found for the last 24 hours. **           Operative/Procedure Notes (last 24 hours)      Kristi Henderson MD at 07/13/22 1316          [facility]  OPERATIVE REPORT      Helena SHIPMAN Cardoso  7/13/2022    Pre-op Diagnosis:   Locally advanced rectal cancer      Post-op Diagnosis:    Locally advanced rectal cancer    Procedure(s):  ROBOTIC LOW ANTERIOR RESECTION   COLOSTOMY   CYSTOSCOPY WITH BILATERAL URETHERAL CATHETERS      Surgeon(s):  Kristi Henderson MD Stark, Timothy, MD    Anesthesia: General with Block    Staff:   Circulator: Nguyen Villasenor RN; Bradly Nash RN  Scrub Person: Rosario Garcia; Val Fan; Bib Deluna  Nursing Assistant: Shikha Sapp  Assistant: Dread Lee PA    Assistant: Dread Lee PA was responsible for performing the following activities: Retraction, Suction, Irrigation, Suturing, Closing, Placing Dressing and Held/Positioned Camera and their skilled assistance was necessary for the success of this case.     Synoptic Findings:  Intent  of Procedure (if related to known cancer diagnosis): This procedure was performed with curative intent related to a known cancer diagnosis.  Synoptic portion: The indication for total mesorectum excision was low rectal tumor with curative intent.      Estimated Blood Loss: 75 mL    Urine Voided: 200 mL    Specimens:                Specimens     ID Source Type Tests Collected By Collected At Frozen?    A Large Intestine, Sigmoid Colon Tissue · TISSUE PATHOLOGY EXAM   Kristi Henderson MD 7/13/22 1478     Description: sigmoid colon and rectum    This specimen was not marked as sent.                Drains:   Urethral Catheter Silicone 16 Fr. (Active)       Findings: Bulky tumor in the upper pelvis.  No evidence of metastatic disease.    Complications: None.    Procedure in Detail:     Lithotomy position.    Cystoscopy and bilateral ureteral catheters with isocyanate green dye will be dictated separately by Dr. Naranjo.        Operative site was prepped and draped in the usual sterile fashion.    GelPort was placed using open technique at the level of the left mid abdomen at the site marked by the stoma nurses preoperatively.  There was no complications upon entering the abdomen.    Abdominal exploration was performed and there is no evidence of metastatic disease.      In the right abdomen, one 5 mm port, 4 8 mm ports and one 12 mm port was placed under direct visualization.  The patient was placed in Trendelenburg position to 28 degrees and 9 degrees right side tilt.  The robot was docked.  There were pelvic adhesions of the small bowel to uterus.  These were taken down sharply.  The small bowel was placed in the right upper quadrant.      Pathology noted in the rectosigmoid colon at the level of the peritoneal reflection. Sigmoid was freely mobile without adhesions to the pelvic organs.  The line of Toldt was then incised.  There was some bleeding in this area near the left ureter.  The left ureter lit up nicely with  ICG.  Hemostasis was achieved here with the vessel seal device.  It appeared to be coming from the backside of the sigmoid mesentery.  The ureteral catheter was moved back and forth and also inspected with ICG and there was no evidence of ureteral injury.    The left ureter was  kept out of the plane of dissection at all times.     The ANA pedicle was isolated and divided using the vessel seal device.  Total mesorectal dissection was performed to the level of the pelvic floor.      Isocyanate green was used to confirm bright fluorescence in the descending colon conduit and the residual rectum.  The descending colon mesentery was transected.    Rectum transected with 3 firings of the sure form 45 stapler, 3 green loads at the pelvic floor.  I oversewed the cuff with a 2 oh strata fix.    Isocyanate green was used to confirm good fluorescence at the descending colon conduit.      A 10 Indonesian flat HERACLIO drain was placed in the pelvis.  The pelvis was hemostatic.  This was brought out through one of the port sites.  The robot was undocked.  The 12 mm port was closed with the needle close device.    Abdomen was inspected for hemostasis.  Achieved.    Abdomen was inspected for other pathology. None noted.     The uterus and adenxal structures appeared normal.    The staple line on the specimen was grasped and brought up through the GelPort.  The abdomen was desufflated.  The fascia had to be enlarged to accommodate the rectal specimen which was bulky.    Fascia closed with #1 non-looped PDS x2 in an interrupted fashion around the colostomy.  Wounds were irrigated with chlorhexidine solution.  Skin closed with monocryl. Exofin dressing applied.     A colostomy was matured using Antonieta technique.  It was pink and healthy-appearing and brooked at the end of the case.    Ureteral catheters were removed intact.    All counts were announced as correct at the end of the case. Patient tolerated procedure well, extubated in the  operating room and transferred to recovery room in stable condition.         Kristi eHnderson MD     Date: 7/13/2022  Time: 16:31 EDT        Electronically signed by Kristi Henderson MD at 07/13/22 1637     Kevin Naranjo MD at 07/13/22 1316     Summary:Operative Report                Cystourethroscopy & Intraoperative Bilateral Catheter Placement Operative Report    Patient Name:  Helena Cardoso  YOB: 1945    Date of Surgery:  7/13/2022     Indications: 76-year-old female with diagnosis of rectal cancer undergoing robotic low anterior resection, colostomy with Dr. Henderson of colorectal surgery.  Urology consulted for intraoperative placement of ureteral identification catheter.  The risk benefits and alternatives to the procedure were discussed with patient she elects to proceed    Pre-op Diagnosis:   Rectal cancer       Post-Op Diagnosis Codes:  Rectal cancer    Procedure/CPT® Codes: 64476, 94623    1. CYSTOSCOPY BILATERAL URETERAL CATHETER/STENT INSERTION  2. MODIFIER 50, BILATERAL STENT PLACEMENT  3. SINGER CATHETER INSERTION  CHANGE    Staff:  Surgeon(s):  Kristi Henderson MD Stark, Timothy, MD      Anesthesia: General with Block    Estimated Blood Loss: none    Implants:    Implant Name Type Inv. Item Serial No.  Lot No. LRB No. Used Action   DEV WND/CLS STRATAFIX SPIRALPDS PLS CT 2/0 15CM 26MM TISHA - [id number] Implant DEV WND/CLS STRATAFIX SPIRALPDS PLS CT 2/0 15CM 26MM TISHA  [employer]  DIV OF J AND J [id number]  1 Implanted       Specimen:          None        Findings:   1.  Normal urinary bladder without evidence of tumor stone or foreign body.  2.  Successful placement of bilateral 5 Bengali ureteral catheter  3.  Successful placement of 16 Bengali Singer catheter    Complications: None immediate    Description of Procedure:   The patient was identified in the preoperative holding area where informed consent was reviewed and signed. The patient was transported the operating room per  anesthesia and placed supine on the operating table. Smooth endotracheal intubation was performed without issue after administration of general anesthesia. The patient was then placed in the dorsal lithotomy position where genitals were prepped and draped in the usual sterile fashion. A brief timeout was performed identifying the correct patient procedure and laterality. Perioperative antibiotics were administered. All pressure points were padded.      Procedure began by inserting a 22 Frisian cystoscope atraumatically per the patient's urethra. Upon entering the bladder formal cystoscopy was performed identifying bilateral orthotopic ureteral orifices and no evidence of tumor, stone, foreign body. Attention was then turned to the right ureteral orifice. A 5 Fr open ended ureteral catheter was inserted into the distal ureter and passed up to the level right renal collecting system without difficulty.  10 mL ICG instilled through the ureteral catheter.  Attention was then turned to the left ureteral orifice.  A 5 Fr open ended ureteralcatheter was inserted into the distal ureter and passed up to the level left renal collecting system without difficulty.   10 mL ICG instilled through the ureteral catheter.      A 16F catheter was placed.  10 cc placed in the balloon.  The ureteral catheters were affixed to the Talamantes catheter.     Patient remained under general anesthesia.  The patient will continue scheduled procedure with colorectal service, Dr. Beatriz MD. Urology service available to assist in any way during the procedure.    Kevin Naranjo MD     Date: 7/13/2022  Time: 15:00 EDT       Electronically signed by Kevin Naranjo MD at 07/13/22 1502       Physician Progress Notes (last 24 hours)  Notes from 07/13/22 0906 through 07/14/22 0906   No notes of this type exist for this encounter.         Consult Notes (last 24 hours)  Notes from 07/13/22 0906 through 07/14/22 0906   No notes of this type exist for this  encounter.

## 2022-07-14 NOTE — PLAN OF CARE
Goal Outcome Evaluation:      Pt pleasant and cooperative, rested well overnight. Pt only req Tylenol for pain. Small amt of stool present in appliance. HERACLIO drain cont. Pt ambulated well last night. IVF cont. Spore prec cont. Pt tolerating clear liq diet well. Q6h BS chk chg to ACHS. Pt r/f insulin coverage stating she in not diabetic. Pt w/ [facility], order put in for d/c at 6am by Dr. Henderson. Per charge nurse, leave in [facility] and have day shift verify w/ urology that removal is okay since it was urology plc. Will ask day shift to f/u. VSS on 2-3L, will try to wean O2 as pt does not wear any at home, pt A&O, will cont to monitor.

## 2022-07-14 NOTE — PLAN OF CARE
Goal Outcome Evaluation:  Plan of Care Reviewed With: patient        Progress: improving  Outcome Evaluation: PT with intermittent c/o pain w/ activity, improved with scheduled tylenol; valium and tramadol given x 1 with improvement; PT reluctant to take any narcotics though. Good ostomy output. [place] out; has voided x 2. Tolerating regular diet. Sat in chair, walked in room multiple times today. Plan for PT eval tomorrow. Patient hopes to go home Saturday.

## 2022-07-14 NOTE — PROGRESS NOTES
"IM progress note      Helena Cardoso  [id number]  1945     LOS: 1 day     Attending: Kristi Henderson MD    Primary Care Provider: Jay Pelaez DO      Chief Complaint/Reason for visit: Rectal cancer    Subjective   Doing well.  Pain is tolerable.  Denies nausea, shortness of breath or chest pain.    Objective     Vital Signs  Visit Vitals  BP 93/50 (BP Location: Right arm, Patient Position: Lying)   Pulse 60   Temp 98.2 °F (36.8 °C) (Oral)   Resp 18   Ht 154.9 cm (61\")   Wt 58.3 kg (128 lb 8.5 oz)   SpO2 94%   BMI 24.29 kg/m²     Temp (24hrs), Av.6 °F (36.4 °C), Min:97.2 °F (36.2 °C), Max:98.2 °F (36.8 °C)      Nutrition: P.o.    Respiratory: RA      Physical Exam:     General Appearance:    Alert, cooperative, in no acute distress   Head:    Normocephalic, without obvious abnormality, atraumatic    Lungs:     Normal effort, symmetric chest rise, no crepitus, clear to      auscultation bilaterally             Heart:    Regular rhythm and normal rate, normal S1 and S2   Abdomen:    Stoma pink, ostomy bag with brown liquid stool.  Incisions CDI   Extremities:   No clubbing, cyanosis or edema.  No deformities.    Pulses:   Pulses palpable and equal bilaterally   Skin:   No bleeding, bruising or rash   Neurologic:   Moves all extremities with no obvious focal motor deficit.  Cranial nerves 2 - 12 grossly intact     Results Review:     I reviewed the patient's new clinical results.   Results from last 7 days   Lab Units 22  0325 22  1648 22  0831   WBC 10*3/mm3 7.28  --  5.11   HEMOGLOBIN g/dL 10.1* 10.6* 13.0   HEMATOCRIT % 32.2* 33.1* 40.2   PLATELETS 10*3/mm3 229  --  288     Results from last 7 days   Lab Units 22  0325 22  0831   SODIUM mmol/L 139 142   POTASSIUM mmol/L 4.0 4.0   CHLORIDE mmol/L 106 106   CO2 mmol/L 26.0 27.0   BUN mg/dL 10 17   CREATININE mg/dL 0.60 0.55*   CALCIUM mg/dL 8.8 8.7   BILIRUBIN mg/dL  --  0.3   ALK PHOS U/L  --  70   ALT (SGPT) U/L  --  8 "   AST (SGOT) U/L  --  12   GLUCOSE mg/dL 158* 110*     I reviewed the patient's new imaging including images and reports.    All medications reviewed.   acetaminophen, 1,000 mg, Oral, Q6H  alvimopan, 12 mg, Oral, BID  enoxaparin, 40 mg, Subcutaneous, Q24H  insulin lispro, 0-7 Units, Subcutaneous, 4x Daily AC & at Bedtime  levothyroxine, 88 mcg, Oral, Daily  sertraline, 25 mg, Oral, Nightly  vancomycin, 125 mg, Oral, Q6H        Assessment & Plan     S/P ROBOTIC LOW ANTERIOR RESECTION , COLOSTOMY    Rectal cancer (HCC)    Hypothyroid        Plan  1. Ambulation  2. Pain control-prns       3. IS-encourage  4. DVT proph- Mechanical, subcutaneous Lovenox  5. Bowel regimen  6. Resume home medications per Dr. Henderson  7. Monitor post-op labs  8. DC planning   9. Diet, Clears, advance diet as tolerated.  IVF initially, monitor volume status.    [facility] consult for stoma teaching     Hypothyroid  -Continue home Synthroid    Dianne Govea, APRN  07/14/22  16:47 EDT

## 2022-07-14 NOTE — CASE MANAGEMENT/SOCIAL WORK
Discharge Planning Assessment  [facility]     Patient Name: Helena Cardoso  MRN: [id number]  Today's Date: 7/14/2022    Admit Date: 7/13/2022     Discharge Needs Assessment     Row Name 07/14/22 1628       Living Environment    People in Home alone    Current Living Arrangements home    Potentially Unsafe Housing Conditions unable to assess    Primary Care Provided by self    Provides Primary Care For no one    Family Caregiver if Needed child(josephine), adult    Family Caregiver Names Esperanza and Janiya - daughters    Quality of Family Relationships helpful;involved;supportive    Able to Return to Prior Arrangements yes       Transition Planning    Patient/Family Anticipates Transition to home       Discharge Needs Assessment    Readmission Within the Last 30 Days no previous admission in last 30 days    Equipment Currently Used at Home cane, straight    Concerns to be Addressed discharge planning    Current Discharge Risk chronically ill;lives alone               Discharge Plan     Row Name 07/14/22 0844       Plan    Plan Home    Patient/Family in Agreement with Plan yes    Plan Comments I have met with Ms. Cardoso at the bedside today to initiate a discharge plan.  She states that she lives alone in her home in [place] [facility].  She reports that she is independent with activities of daily living and mobility and uses only a tri-cane to assist with mobility.  Ms. Cardoso' daughters live close by and are available to assist as needed.  She denies current receipt of home health/outpatient services.  She states that she plans to return home at discharge and anticipates no needs.  PT/OT recommendations are pending.  CM will cont to follow the plan of care and assist with discharge planning as recommendations become available.    Final Discharge Disposition Code 01 - home or self-care              Continued Care and Services - Admitted Since 7/13/2022    Coordination has not been started for this encounter.           Demographic Summary     Row Name 07/14/22 7807       General Information    General Information Comments I have confirmed with Ms. Cardoso that her  PCP is Jay Pelaez DO and her insurance is [employer] [place] [employer].               Functional Status    No documentation.                Psychosocial    No documentation.                Abuse/Neglect    No documentation.                Legal    No documentation.                Substance Abuse    No documentation.                Patient Forms    No documentation.                   Gricelda Woodard RN

## 2022-07-14 NOTE — PROGRESS NOTES
Seen and examined.  Chart data reviewed.  Overall doing well.  Had a good night.  Reports feeling wonderful.  Rectal pain has resolved.  Chart data reassuring.  Exam looks good.  Stoma has retracted a bit but is pink.  No reason for revision at this point.  Discussed with her daughter at bedside.  Potentially home Friday but more likely Saturday.  Needs stoma teaching.

## 2022-07-15 ENCOUNTER — ANESTHESIA EVENT (OUTPATIENT)
Dept: PERIOP | Facility: HOSPITAL | Age: 77
End: 2022-07-15

## 2022-07-15 ENCOUNTER — ANESTHESIA (OUTPATIENT)
Dept: PERIOP | Facility: HOSPITAL | Age: 77
End: 2022-07-15

## 2022-07-15 LAB
ANION GAP SERPL CALCULATED.3IONS-SCNC: 4 MMOL/L (ref 5–15)
BUN SERPL-MCNC: 7 MG/DL (ref 8–23)
BUN/CREAT SERPL: 12.7 (ref 7–25)
CALCIUM SPEC-SCNC: 9 MG/DL (ref 8.6–10.5)
CHLORIDE SERPL-SCNC: 102 MMOL/L (ref 98–107)
CO2 SERPL-SCNC: 28 MMOL/L (ref 22–29)
CREAT SERPL-MCNC: 0.55 MG/DL (ref 0.57–1)
DEPRECATED RDW RBC AUTO: 44.6 FL (ref 37–54)
EGFRCR SERPLBLD CKD-EPI 2021: 94.5 ML/MIN/1.73
ERYTHROCYTE [DISTWIDTH] IN BLOOD BY AUTOMATED COUNT: 14.2 % (ref 12.3–15.4)
GLUCOSE BLDC GLUCOMTR-MCNC: 111 MG/DL (ref 70–130)
GLUCOSE BLDC GLUCOMTR-MCNC: 123 MG/DL (ref 70–130)
GLUCOSE BLDC GLUCOMTR-MCNC: 173 MG/DL (ref 70–130)
GLUCOSE BLDC GLUCOMTR-MCNC: 89 MG/DL (ref 70–130)
GLUCOSE SERPL-MCNC: 96 MG/DL (ref 65–99)
HCT VFR BLD AUTO: 34.7 % (ref 34–46.6)
HGB BLD-MCNC: 11.1 G/DL (ref 12–15.9)
MCH RBC QN AUTO: 27.7 PG (ref 26.6–33)
MCHC RBC AUTO-ENTMCNC: 32 G/DL (ref 31.5–35.7)
MCV RBC AUTO: 86.5 FL (ref 79–97)
PLATELET # BLD AUTO: 243 10*3/MM3 (ref 140–450)
PMV BLD AUTO: 10.2 FL (ref 6–12)
POTASSIUM SERPL-SCNC: 3.9 MMOL/L (ref 3.5–5.2)
RBC # BLD AUTO: 4.01 10*6/MM3 (ref 3.77–5.28)
SODIUM SERPL-SCNC: 134 MMOL/L (ref 136–145)
WBC NRBC COR # BLD: 6.71 10*3/MM3 (ref 3.4–10.8)

## 2022-07-15 PROCEDURE — 25010000002 PROPOFOL 10 MG/ML EMULSION: Performed by: NURSE ANESTHETIST, CERTIFIED REGISTERED

## 2022-07-15 PROCEDURE — 25010000002 ENOXAPARIN PER 10 MG

## 2022-07-15 PROCEDURE — 0DQN4ZZ REPAIR SIGMOID COLON, PERCUTANEOUS ENDOSCOPIC APPROACH: ICD-10-PCS | Performed by: COLON & RECTAL SURGERY

## 2022-07-15 PROCEDURE — 97161 PT EVAL LOW COMPLEX 20 MIN: CPT | Performed by: PHYSICAL THERAPIST

## 2022-07-15 PROCEDURE — 80048 BASIC METABOLIC PNL TOTAL CA: CPT | Performed by: COLON & RECTAL SURGERY

## 2022-07-15 PROCEDURE — 25010000002 DEXAMETHASONE PER 1 MG: Performed by: NURSE ANESTHETIST, CERTIFIED REGISTERED

## 2022-07-15 PROCEDURE — 85027 COMPLETE CBC AUTOMATED: CPT | Performed by: COLON & RECTAL SURGERY

## 2022-07-15 PROCEDURE — 25010000002 ERTAPENEM PER 500 MG: Performed by: COLON & RECTAL SURGERY

## 2022-07-15 PROCEDURE — 25010000002 FENTANYL CITRATE (PF) 50 MCG/ML SOLUTION

## 2022-07-15 PROCEDURE — 25010000002 FENTANYL CITRATE (PF) 50 MCG/ML SOLUTION: Performed by: NURSE ANESTHETIST, CERTIFIED REGISTERED

## 2022-07-15 PROCEDURE — 25010000002 ONDANSETRON PER 1 MG: Performed by: NURSE ANESTHETIST, CERTIFIED REGISTERED

## 2022-07-15 PROCEDURE — 82962 GLUCOSE BLOOD TEST: CPT

## 2022-07-15 RX ORDER — FENTANYL CITRATE 50 UG/ML
50 INJECTION, SOLUTION INTRAMUSCULAR; INTRAVENOUS
Status: DISCONTINUED | OUTPATIENT
Start: 2022-07-15 | End: 2022-07-15 | Stop reason: HOSPADM

## 2022-07-15 RX ORDER — EPHEDRINE SULFATE 50 MG/ML
5 INJECTION, SOLUTION INTRAVENOUS ONCE AS NEEDED
Status: DISCONTINUED | OUTPATIENT
Start: 2022-07-15 | End: 2022-07-15 | Stop reason: HOSPADM

## 2022-07-15 RX ORDER — PROPOFOL 10 MG/ML
VIAL (ML) INTRAVENOUS AS NEEDED
Status: DISCONTINUED | OUTPATIENT
Start: 2022-07-15 | End: 2022-07-15 | Stop reason: SURG

## 2022-07-15 RX ORDER — DEXAMETHASONE SODIUM PHOSPHATE 4 MG/ML
INJECTION, SOLUTION INTRA-ARTICULAR; INTRALESIONAL; INTRAMUSCULAR; INTRAVENOUS; SOFT TISSUE AS NEEDED
Status: DISCONTINUED | OUTPATIENT
Start: 2022-07-15 | End: 2022-07-15 | Stop reason: SURG

## 2022-07-15 RX ORDER — ROCURONIUM BROMIDE 10 MG/ML
INJECTION, SOLUTION INTRAVENOUS AS NEEDED
Status: DISCONTINUED | OUTPATIENT
Start: 2022-07-15 | End: 2022-07-15 | Stop reason: SURG

## 2022-07-15 RX ORDER — MIDAZOLAM HYDROCHLORIDE 1 MG/ML
0.5 INJECTION INTRAMUSCULAR; INTRAVENOUS
Status: DISCONTINUED | OUTPATIENT
Start: 2022-07-15 | End: 2022-07-15 | Stop reason: HOSPADM

## 2022-07-15 RX ORDER — LIDOCAINE HYDROCHLORIDE 10 MG/ML
INJECTION, SOLUTION EPIDURAL; INFILTRATION; INTRACAUDAL; PERINEURAL AS NEEDED
Status: DISCONTINUED | OUTPATIENT
Start: 2022-07-15 | End: 2022-07-15 | Stop reason: SURG

## 2022-07-15 RX ORDER — FENTANYL CITRATE 50 UG/ML
INJECTION, SOLUTION INTRAMUSCULAR; INTRAVENOUS
Status: COMPLETED
Start: 2022-07-15 | End: 2022-07-15

## 2022-07-15 RX ORDER — ONDANSETRON 2 MG/ML
4 INJECTION INTRAMUSCULAR; INTRAVENOUS ONCE AS NEEDED
Status: DISCONTINUED | OUTPATIENT
Start: 2022-07-15 | End: 2022-07-15 | Stop reason: HOSPADM

## 2022-07-15 RX ORDER — SODIUM CHLORIDE 0.9 % (FLUSH) 0.9 %
10 SYRINGE (ML) INJECTION EVERY 12 HOURS SCHEDULED
Status: DISCONTINUED | OUTPATIENT
Start: 2022-07-15 | End: 2022-07-15 | Stop reason: HOSPADM

## 2022-07-15 RX ORDER — SODIUM CHLORIDE, SODIUM LACTATE, POTASSIUM CHLORIDE, CALCIUM CHLORIDE 600; 310; 30; 20 MG/100ML; MG/100ML; MG/100ML; MG/100ML
9 INJECTION, SOLUTION INTRAVENOUS CONTINUOUS PRN
Status: DISCONTINUED | OUTPATIENT
Start: 2022-07-15 | End: 2022-07-19 | Stop reason: HOSPADM

## 2022-07-15 RX ORDER — SACCHAROMYCES BOULARDII 250 MG
250 CAPSULE ORAL 2 TIMES DAILY
Status: DISCONTINUED | OUTPATIENT
Start: 2022-07-15 | End: 2022-07-19 | Stop reason: HOSPADM

## 2022-07-15 RX ORDER — ONDANSETRON 2 MG/ML
INJECTION INTRAMUSCULAR; INTRAVENOUS AS NEEDED
Status: DISCONTINUED | OUTPATIENT
Start: 2022-07-15 | End: 2022-07-15 | Stop reason: SURG

## 2022-07-15 RX ORDER — SODIUM CHLORIDE 0.9 % (FLUSH) 0.9 %
10 SYRINGE (ML) INJECTION AS NEEDED
Status: DISCONTINUED | OUTPATIENT
Start: 2022-07-15 | End: 2022-07-15 | Stop reason: HOSPADM

## 2022-07-15 RX ORDER — NALOXONE HCL 0.4 MG/ML
0.4 VIAL (ML) INJECTION AS NEEDED
Status: DISCONTINUED | OUTPATIENT
Start: 2022-07-15 | End: 2022-07-15 | Stop reason: HOSPADM

## 2022-07-15 RX ORDER — FAMOTIDINE 10 MG/ML
20 INJECTION, SOLUTION INTRAVENOUS
Status: COMPLETED | OUTPATIENT
Start: 2022-07-15 | End: 2022-07-15

## 2022-07-15 RX ORDER — FENTANYL CITRATE 50 UG/ML
INJECTION, SOLUTION INTRAMUSCULAR; INTRAVENOUS AS NEEDED
Status: DISCONTINUED | OUTPATIENT
Start: 2022-07-15 | End: 2022-07-15 | Stop reason: SURG

## 2022-07-15 RX ORDER — SODIUM CHLORIDE, SODIUM LACTATE, POTASSIUM CHLORIDE, CALCIUM CHLORIDE 600; 310; 30; 20 MG/100ML; MG/100ML; MG/100ML; MG/100ML
100 INJECTION, SOLUTION INTRAVENOUS CONTINUOUS
Status: DISCONTINUED | OUTPATIENT
Start: 2022-07-15 | End: 2022-07-15

## 2022-07-15 RX ADMIN — LEVOTHYROXINE SODIUM 88 MCG: 88 TABLET ORAL at 11:52

## 2022-07-15 RX ADMIN — ONDANSETRON 4 MG: 2 INJECTION INTRAMUSCULAR; INTRAVENOUS at 16:39

## 2022-07-15 RX ADMIN — FAMOTIDINE 20 MG: 10 INJECTION INTRAVENOUS at 15:27

## 2022-07-15 RX ADMIN — ENOXAPARIN SODIUM 40 MG: 40 INJECTION SUBCUTANEOUS at 08:24

## 2022-07-15 RX ADMIN — TRAMADOL HYDROCHLORIDE 50 MG: 50 TABLET, COATED ORAL at 08:24

## 2022-07-15 RX ADMIN — DEXAMETHASONE SODIUM PHOSPHATE 4 MG: 4 INJECTION, SOLUTION INTRAMUSCULAR; INTRAVENOUS at 16:05

## 2022-07-15 RX ADMIN — FENTANYL CITRATE 50 MCG: 50 INJECTION, SOLUTION INTRAMUSCULAR; INTRAVENOUS at 17:16

## 2022-07-15 RX ADMIN — Medication 1 G: at 16:08

## 2022-07-15 RX ADMIN — PROPOFOL 100 MG: 10 INJECTION, EMULSION INTRAVENOUS at 16:05

## 2022-07-15 RX ADMIN — VANCOMYCIN HYDROCHLORIDE 125 MG: 125 CAPSULE ORAL at 05:29

## 2022-07-15 RX ADMIN — ACETAMINOPHEN 1000 MG: 500 TABLET ORAL at 08:24

## 2022-07-15 RX ADMIN — SERTRALINE HYDROCHLORIDE 25 MG: 50 TABLET ORAL at 21:03

## 2022-07-15 RX ADMIN — ROCURONIUM BROMIDE 40 MG: 10 INJECTION INTRAVENOUS at 16:05

## 2022-07-15 RX ADMIN — LIDOCAINE HYDROCHLORIDE 50 MG: 10 INJECTION, SOLUTION EPIDURAL; INFILTRATION; INTRACAUDAL; PERINEURAL at 16:05

## 2022-07-15 RX ADMIN — PROPOFOL 25 MCG/KG/MIN: 10 INJECTION, EMULSION INTRAVENOUS at 16:20

## 2022-07-15 RX ADMIN — VANCOMYCIN HYDROCHLORIDE 125 MG: 125 CAPSULE ORAL at 11:52

## 2022-07-15 RX ADMIN — ALVIMOPAN 12 MG: 12 CAPSULE ORAL at 08:24

## 2022-07-15 RX ADMIN — SUGAMMADEX 200 MG: 100 INJECTION, SOLUTION INTRAVENOUS at 16:39

## 2022-07-15 RX ADMIN — FENTANYL CITRATE 100 MCG: 50 INJECTION, SOLUTION INTRAMUSCULAR; INTRAVENOUS at 16:05

## 2022-07-15 RX ADMIN — Medication 250 MG: at 21:04

## 2022-07-15 RX ADMIN — FENTANYL CITRATE 50 MCG: 50 INJECTION, SOLUTION INTRAMUSCULAR; INTRAVENOUS at 17:29

## 2022-07-15 RX ADMIN — VANCOMYCIN HYDROCHLORIDE 125 MG: 125 CAPSULE ORAL at 18:49

## 2022-07-15 RX ADMIN — SODIUM CHLORIDE, POTASSIUM CHLORIDE, SODIUM LACTATE AND CALCIUM CHLORIDE 9 ML/HR: 600; 310; 30; 20 INJECTION, SOLUTION INTRAVENOUS at 15:27

## 2022-07-15 NOTE — ANESTHESIA PROCEDURE NOTES
Airway  Urgency: elective    Date/Time: 7/15/2022 4:06 PM  Airway not difficult    General Information and Staff    Patient location during procedure: OR  CRNA/CAA: Lai Ortega CRNA    Indications and Patient Condition  Indications for airway management: airway protection    Preoxygenated: yes  MILS not maintained throughout  Mask difficulty assessment: 1 - vent by mask    Final Airway Details  Final airway type: endotracheal airway      Successful airway: ETT  Cuffed: yes   Successful intubation technique: direct laryngoscopy  Endotracheal tube insertion site: oral  Blade: Sapp  Blade size: 2  ETT size (mm): 7.0  Cormack-Lehane Classification: grade I - full view of glottis  Placement verified by: chest auscultation and capnometry   Cuff volume (mL): 5  Measured from: lips  ETT/EBT  to lips (cm): 20  Number of attempts at approach: 1  Assessment: lips, teeth, and gum same as pre-op and atraumatic intubation    Additional Comments  Negative epigastric sounds, Breath sound equal bilaterally with symmetric chest rise and fall

## 2022-07-15 NOTE — PLAN OF CARE
Goal Outcome Evaluation:  Plan of Care Reviewed With: patient      Pt up to BSC many times last night, otherwise uneventful night with stable VS. CTM

## 2022-07-15 NOTE — ANESTHESIA PREPROCEDURE EVALUATION
Anesthesia Evaluation     Patient summary reviewed and Nursing notes reviewed   no history of anesthetic complications:  NPO Solid Status: > 8 hours  NPO Liquid Status: > 2 hours           Airway   Mallampati: I  TM distance: >3 FB  Neck ROM: full  No difficulty expected  Dental    (+) edentulous    Pulmonary     breath sounds clear to auscultation  Cardiovascular   Exercise tolerance: good (4-7 METS)    ECG reviewed  Rhythm: regular  Rate: normal    (+) CAD, CABG >6 Months, hyperlipidemia,     ROS comment: •  Left Ventricle: The left ventricle is normal size. There is normal left   ventricular myocardial thickness and mass. The left ventricular systolic   function is borderline reduced. The LVEF as measured by biplane volume is   52%. The inferolateral, inferior and inferoseptal walls are hypokinetic.   The left ventricular filling pressure is normal.   •  Right Ventricle: Right ventricle size is normal. The right ventricular   systolic function is normal.   •  Valves: Mild aortic regurigtation.   •  Pericardium: No pericardial effusion.       Neuro/Psych  (+) psychiatric history Depression,    GI/Hepatic/Renal/Endo    (+)  hiatal hernia,  thyroid problem hypothyroidism    Musculoskeletal     Abdominal   (+) obese,     Abdomen: soft.   Substance History      OB/GYN          Other   blood dyscrasia anemia,   history of cancer active                    Anesthesia Plan    ASA 3     general     intravenous induction     Anesthetic plan, risks, benefits, and alternatives have been provided, discussed and informed consent has been obtained with: patient.    Plan discussed with CRNA.        CODE STATUS:    Code Status (Patient has no pulse and is not breathing): CPR (Attempt to Resuscitate)  Medical Interventions (Patient has pulse or is breathing): Full

## 2022-07-15 NOTE — PROGRESS NOTES
"IM progress note      Helena Cardoso  [id number]  1945     LOS: 2 days     Attending: Kristi Henderson MD    Primary Care Provider: Jay Pelaez DO      Chief Complaint/Reason for visit: Rectal cancer    Subjective   Feels ok. Tolerating po diet. Stoma with output. No n/vom. Still with some pain, improved.    Objective      Visit Vitals  BP 96/48 (BP Location: Left arm, Patient Position: Lying)   Pulse 58   Temp 98.6 °F (37 °C) (Oral)   Resp 14   Ht 154.9 cm (61\")   Wt 58.3 kg (128 lb 8.5 oz)   SpO2 94%   BMI 24.29 kg/m²     Temp (24hrs), Av.5 °F (36.9 °C), Min:98.2 °F (36.8 °C), Max:98.6 °F (37 °C)      Nutrition: P.o.    Respiratory: RA      Physical Exam:     General Appearance:    Alert, cooperative, in no acute distress   Head:    Normocephalic, without obvious abnormality, atraumatic    Lungs:     Normal effort, symmetric chest rise, no crepitus, clear to      auscultation bilaterally             Heart:    Regular rhythm and normal rate, normal S1 and S2   Abdomen:    Stoma dark discoloration, ostomy bag with brown liquid stool.  Incisions CDI   Extremities:   No clubbing, cyanosis or edema.  No deformities.    Pulses:   Pulses palpable and equal bilaterally   Skin:   No bleeding, bruising or rash   Neurologic:   Moves all extremities with no obvious focal motor deficit.  Cranial nerves 2 - 12 grossly intact     Results Review:     I reviewed the patient's new clinical results.   Results from last 7 days   Lab Units 22  0325 22  1648 22  0831   WBC 10*3/mm3 7.28  --  5.11   HEMOGLOBIN g/dL 10.1* 10.6* 13.0   HEMATOCRIT % 32.2* 33.1* 40.2   PLATELETS 10*3/mm3 229  --  288     Results from last 7 days   Lab Units 22  0325 22  0831   SODIUM mmol/L 139 142   POTASSIUM mmol/L 4.0 4.0   CHLORIDE mmol/L 106 106   CO2 mmol/L 26.0 27.0   BUN mg/dL 10 17   CREATININE mg/dL 0.60 0.55*   CALCIUM mg/dL 8.8 8.7   BILIRUBIN mg/dL  --  0.3   ALK PHOS U/L  --  70   ALT (SGPT) U/L  --  " 8   AST (SGOT) U/L  --  12   GLUCOSE mg/dL 158* 110*     I reviewed the patient's new imaging including images and reports.    All medications reviewed.   acetaminophen, 1,000 mg, Oral, Q6H  enoxaparin, 40 mg, Subcutaneous, Q24H  insulin lispro, 0-7 Units, Subcutaneous, 4x Daily AC & at Bedtime  levothyroxine, 88 mcg, Oral, Daily  sertraline, 25 mg, Oral, Nightly  vancomycin, 125 mg, Oral, Q6H        Assessment & Plan     S/P ROBOTIC LOW ANTERIOR RESECTION , COLOSTOMY    Rectal cancer (HCC)    Hypothyroid        Plan  1. Ambulation, encouraged.  2. Pain control-prns       3. IS-encourage  4. DVT proph- Mechanical, subcutaneous Lovenox  5. Bowel regimen  6. Resume home medications per Dr. Henderson  7. Monitor post-op labs  8. DC planning   9. Diet, soft GI as tolerated.     [facility] consult for stoma teaching/ following.      Hypothyroid  -Continue home Synthroid    Naresh Villela MD  07/15/22  13:58 EDT

## 2022-07-15 NOTE — PLAN OF CARE
Problem: Adult Inpatient Plan of Care  Goal: Plan of Care Review  Outcome: Ongoing, Progressing  Flowsheets (Taken 7/15/2022 1657)  Progress: improving  Plan of Care Reviewed With:   patient   daughter  Goal: Patient-Specific Goal (Individualized)  Outcome: Ongoing, Progressing  Goal: Absence of Hospital-Acquired Illness or Injury  Outcome: Ongoing, Progressing  Intervention: Identify and Manage Fall Risk  Recent Flowsheet Documentation  Taken 7/15/2022 1600 by Manolo Townsend RN  Safety Promotion/Fall Prevention: patient off unit  Taken 7/15/2022 1400 by Manolo Townsend RN  Safety Promotion/Fall Prevention:   activity supervised   assistive device/personal items within reach   clutter free environment maintained   fall prevention program maintained   nonskid shoes/slippers when out of bed   toileting scheduled   safety round/check completed   room organization consistent  Taken 7/15/2022 1200 by Manolo Townsend RN  Safety Promotion/Fall Prevention:   activity supervised   assistive device/personal items within reach   clutter free environment maintained   fall prevention program maintained   nonskid shoes/slippers when out of bed   toileting scheduled   safety round/check completed   room organization consistent  Taken 7/15/2022 1000 by Manolo Townsend RN  Safety Promotion/Fall Prevention:   clutter free environment maintained   assistive device/personal items within reach   activity supervised   fall prevention program maintained   nonskid shoes/slippers when out of bed   safety round/check completed   toileting scheduled   room organization consistent  Taken 7/15/2022 0800 by Manolo Townsend RN  Safety Promotion/Fall Prevention:   activity supervised   assistive device/personal items within reach   clutter free environment maintained   fall prevention program maintained   nonskid shoes/slippers when out of bed   room organization consistent   safety round/check completed   toileting  scheduled  Intervention: Prevent Skin Injury  Recent Flowsheet Documentation  Taken 7/15/2022 1400 by Manolo Townsend RN  Body Position:   position changed independently   supine, legs elevated  Skin Protection:   adhesive use limited   incontinence pads utilized   tubing/devices free from skin contact   transparent dressing maintained   skin-to-skin areas padded   skin-to-device areas padded  Taken 7/15/2022 1200 by Manolo Townsend RN  Body Position:   position changed independently   legs elevated  Skin Protection:   adhesive use limited   incontinence pads utilized   tubing/devices free from skin contact   transparent dressing maintained   skin-to-skin areas padded   skin-to-device areas padded  Taken 7/15/2022 1000 by Manolo Townsend RN  Body Position:   position changed independently   legs elevated  Skin Protection:   adhesive use limited   incontinence pads utilized   tubing/devices free from skin contact   transparent dressing maintained   skin-to-skin areas padded   skin-to-device areas padded  Taken 7/15/2022 0800 by Manolo Townsend RN  Body Position:   position changed independently   supine, legs elevated  Skin Protection:   adhesive use limited   incontinence pads utilized   transparent dressing maintained   tubing/devices free from skin contact   skin-to-skin areas padded   skin-to-device areas padded  Intervention: Prevent and Manage VTE (Venous Thromboembolism) Risk  Recent Flowsheet Documentation  Taken 7/15/2022 1400 by Manolo Townsend RN  Activity Management:   activity adjusted per tolerance   activity encouraged   up in chair  Taken 7/15/2022 1200 by Manolo Townsend RN  Activity Management:   activity adjusted per tolerance   activity encouraged   up in chair   up to bedside commode  Taken 7/15/2022 1000 by Manolo Townsend RN  Activity Management:   activity adjusted per tolerance   activity encouraged   up in chair   ambulated outside room   up to bedside commode  Taken 7/15/2022  0800 by Manolo Townsend RN  Activity Management:   activity adjusted per tolerance   activity encouraged   up to bedside commode  VTE Prevention/Management:   bleeding risk factor(s) identified   dorsiflexion/plantar flexion performed  Intervention: Prevent Infection  Recent Flowsheet Documentation  Taken 7/15/2022 1400 by Manolo Townsend RN  Infection Prevention:   environmental surveillance performed   hand hygiene promoted   personal protective equipment utilized   rest/sleep promoted  Taken 7/15/2022 1200 by Manolo Townsend RN  Infection Prevention:   environmental surveillance performed   hand hygiene promoted   personal protective equipment utilized   rest/sleep promoted  Taken 7/15/2022 1000 by Manolo Townsend RN  Infection Prevention:   environmental surveillance performed   personal protective equipment utilized   rest/sleep promoted   hand hygiene promoted  Taken 7/15/2022 0800 by Manolo Townsend RN  Infection Prevention:   environmental surveillance performed   hand hygiene promoted   personal protective equipment utilized   rest/sleep promoted  Goal: Optimal Comfort and Wellbeing  Outcome: Ongoing, Progressing  Intervention: Monitor Pain and Promote Comfort  Recent Flowsheet Documentation  Taken 7/15/2022 1400 by Manolo Townsend RN  Pain Management Interventions:   quiet environment facilitated   relaxation techniques promoted  Taken 7/15/2022 1000 by Manolo Townsend RN  Pain Management Interventions:   quiet environment facilitated   relaxation techniques promoted   position adjusted   pillow support provided  Taken 7/15/2022 0900 by Manolo Townsend RN  Pain Management Interventions:   quiet environment facilitated   relaxation techniques promoted   pillow support provided  Taken 7/15/2022 0824 by Manolo Townsend RN  Pain Management Interventions: (pt refusing morphine)   medication offered but refused   pain management plan reviewed with patient/caregiver   see MAR   quiet environment  facilitated   relaxation techniques promoted   other (see comments)  Intervention: Provide Person-Centered Care  Recent Flowsheet Documentation  Taken 7/15/2022 1400 by Manolo Townsend RN  Trust Relationship/Rapport:   care explained   choices provided   emotional support provided   empathic listening provided   questions answered   thoughts/feelings acknowledged   questions encouraged   reassurance provided  Taken 7/15/2022 1200 by Manolo Townsend RN  Trust Relationship/Rapport:   care explained   choices provided   emotional support provided   empathic listening provided   questions answered   questions encouraged   reassurance provided   thoughts/feelings acknowledged  Taken 7/15/2022 1000 by Manolo Townsend RN  Trust Relationship/Rapport:   care explained   choices provided   emotional support provided   reassurance provided   thoughts/feelings acknowledged   empathic listening provided   questions answered  Taken 7/15/2022 0800 by Manolo Townsend RN  Trust Relationship/Rapport:   care explained   choices provided   emotional support provided   empathic listening provided   questions encouraged   reassurance provided   thoughts/feelings acknowledged   questions answered  Goal: Readiness for Transition of Care  Outcome: Ongoing, Progressing     Problem: Pain Acute  Goal: Acceptable Pain Control and Functional Ability  Outcome: Ongoing, Progressing  Intervention: Prevent or Manage Pain  Recent Flowsheet Documentation  Taken 7/15/2022 0800 by Manolo Townsend RN  Medication Review/Management: medications reviewed  Intervention: Develop Pain Management Plan  Recent Flowsheet Documentation  Taken 7/15/2022 1400 by Manolo Townsend RN  Pain Management Interventions:   quiet environment facilitated   relaxation techniques promoted  Taken 7/15/2022 1000 by Manolo Townsend RN  Pain Management Interventions:   quiet environment facilitated   relaxation techniques promoted   position adjusted   pillow support  provided  Taken 7/15/2022 0900 by Manolo Townsend RN  Pain Management Interventions:   quiet environment facilitated   relaxation techniques promoted   pillow support provided  Taken 7/15/2022 0824 by Manolo Townsend RN  Pain Management Interventions: (pt refusing morphine)   medication offered but refused   pain management plan reviewed with patient/caregiver   see MAR   quiet environment facilitated   relaxation techniques promoted   other (see comments)  Intervention: Optimize Psychosocial Wellbeing  Recent Flowsheet Documentation  Taken 7/15/2022 1400 by Manolo Townsend RN  Diversional Activities: television  Taken 7/15/2022 1200 by Manolo Townsend RN  Diversional Activities: television  Taken 7/15/2022 1000 by Manolo Townsend RN  Diversional Activities: television  Taken 7/15/2022 0800 by Manolo Townsend RN  Diversional Activities: television   Goal Outcome Evaluation:  Plan of Care Reviewed With: patient, daughter        Progress: improving

## 2022-07-15 NOTE — OP NOTE
[facility]  OPERATIVE REPORT      Helena Cardoso  7/15/2022    Pre-op Diagnosis:   Colostomy ischemia      Post-op Diagnosis:    Colostomy ischemia    Procedure(s):  COLOSTOMY REVISION    Surgeon(s):  Kristi Henderson MD    Anesthesia: General    Staff:   Circulator: Gina Jovel RN  Scrub Person: Val Fan  Nursing Assistant: Edwige Lemons  Assistant: Mercedes Burns PA    Assistant: Mercedes Burns PA was responsible for performing the following activities: Retraction, Suction and Placing Dressing and their skilled assistance was necessary for the success of this case.     Synoptic Findings:  Intent of Procedure (if related to known cancer diagnosis): This procedure was not related to any known cancer diagnosis.      Estimated Blood Loss: minimal    Urine Voided: * No values recorded between 7/15/2022  3:54 PM and 7/15/2022  4:53 PM *    Specimens:                None          Drains:   Closed/Suction Drain RLQ (Active)   Site Description Unable to view 07/15/22 0800   Dressing Status Other (Comment) 07/15/22 1657   Drainage Appearance Bloody 07/15/22 0800   Status To bulb suction 07/15/22 0800   Output (mL) 40 mL 07/15/22 1400       Colostomy LLQ (Active)   Wound Image   07/15/22 1130   Stomal Appliance 1 piece;Changed 07/15/22 1656   Stoma Appearance flush with skin;dark;dusky 07/15/22 1400   Peristomal Assessment MARSHA 07/15/22 0800   Stoma Function stool 07/15/22 1656   Stool Color brown 07/15/22 1656   Stool Consistency loose 07/15/22 0800   Output (mL) 75 mL 07/14/22 1437       External Urinary Catheter (Active)       [REMOVED] Urethral Catheter Silicone 16 Fr. (Removed)   Daily Indications Selected surgeries ( tract, abdomen) 07/14/22 0745   Site Assessment Clean;Skin intact 07/14/22 0745   Collection Container Standard drainage bag 07/14/22 0745   Securement Method Securing device 07/14/22 0745   Catheter care complete Yes 07/13/22 2000   Output (mL) 750  mL 07/14/22 1015       Findings: approximately 2 cm of distal necrosis, otherwise healthy appearing colon without tension    Complications: None    Procedure in Detail:   Karolina was brought to the operating room placed in supine position after successful induction of general endotracheal anesthesia, the operative site was prepped and draped in the usual sterile fashion.  The HERACLIO drain was prepped into the field.  The colostomy was released from the mucocutaneous junction.  One of the skin sutures was also released to increase the size of the skin aperture.  The colon was mobilized circumferentially.  There was adequate length on the colon it was freely mobile in the stomal aperture.  I trimmed the ischemic portion which was about 2 cm.  I then used one of the epiploic appendages and tacked it to the peristomal skin and put a bridge underneath it to support the colon.  I used Antonieta technique to tack the rest of the colon to the mucocutaneous junction although I did leave some gap in between to try to reduce the chance of potential ischemia from venous congestion.  The colon looked pink and healthy and viable at the end of the case.  Stoma appliance was placed.    Kristi Henderson MD     Date: 7/15/2022  Time: 17:02 EDT

## 2022-07-15 NOTE — NURSING NOTE
[facility] follow up for ostomy education    Surgery date: 07/13/2022    Ostomy type: Colostomy    Appliance in place: Oralia 8331 was changed to a Oralia new image convex 2 and three-quarter inch was then changed Oralia new image convex 2-1/4 inch    Last pouch change: 0715 2022    Stoma Assessment: Stoma is flush with the skin and necrotic dusky looking.      Education performed: Extensive education done on pouch changing and ordering in the catalog.  We did a pouch change with patient and 1 daughter and then again did another pouch change with another daughter.    Woc will continue to follow.     Please contact with questions or concerns.     Provider notified the blackish dusky looking appearance of stoma.  Surgeon came to the room to look.  Requesting glass test tube to intubate stoma and check for necrotic tissue.  After the last chest tube was finally received Woc was able to loop the and an insert in the stoma and look for with a flashlight necrotic tissue.  Right under the suture line it was all pink but in the 11:00 aspect there was a streak of yellow extending all the way down.  Not purple or black.  Daughter in room was able to visualize the same as well.  Discussed with surgeon.    New orders for possible stoma revision, n.p.o.  Daughter called Wo requesting Woc to come back and explained to patient benefits versus risks of receiving a stomal revision.  Woc stated that in this case and stoma was permanent that it would probably be a good idea to get the revision now instead of later so as in case it was not operable anymore.  A stoma revision will make living with the ostomy and pouching more manageable.

## 2022-07-15 NOTE — PROGRESS NOTES
Seen and examined with stoma nurse.   Ostomy with superficial necrosis and some retraction.   Vs look good and abd exam appropriate.  She has been tolerating a diet and producing stool from the ostomy.     Family at the bedside.   I am concerned for long term risk of stoma stricture given the ischemia. My best recommendation even though it is working and she is otherwise doing well is to revise it surgically. Risks, benefits and alternatives of stoma revision were discussed with patient and daughters. They expressed understanding and desire to proceed. Hopefully we will not need to go back into the abdomen, but it is possible.

## 2022-07-15 NOTE — PLAN OF CARE
"Goal Outcome Evaluation:  Plan of Care Reviewed With: patient           Outcome Evaluation: PT evaluation completed.  Pt transferred supine-->sit with ModAx1, stood multiple reps with MinAx1, and ambulated 12 feet using \"hurry\"cane with CGAx1.  Distance limited by pain, incontinence, fatigue, and weakness.  Skilled PT services warranted to improve mobility and safety.  Pt lives alone and would benefit from inpt rehab at d/c.  If pt declines, she will need home with 24/7 care ad  PT.  "

## 2022-07-15 NOTE — ANESTHESIA POSTPROCEDURE EVALUATION
Patient: Helena Cardoso    Procedure Summary     Date: 07/15/22 Room / [facility]:  [facility] /  [facility]    Anesthesia Start: 1555 Anesthesia Stop:     Procedure: COLOSTOMY LAPAROSCOPIC REVISION (N/A Abdomen) Diagnosis:     Surgeons: Kristi Henderson MD Provider: Lai Sinclair MD    Anesthesia Type: general ASA Status: 3          Anesthesia Type: general    Vitals  No vitals data found for the desired time range.          Post Anesthesia Care and Evaluation    Patient location during evaluation: PACU  Patient participation: complete - patient participated  Level of consciousness: awake and alert  Pain score: 0  Pain management: adequate    Airway patency: patent  Anesthetic complications: No anesthetic complications  PONV Status: none  Cardiovascular status: hemodynamically stable and acceptable  Respiratory status: nonlabored ventilation, acceptable and nasal cannula  Hydration status: acceptable

## 2022-07-15 NOTE — THERAPY EVALUATION
"Patient Name: Helena Cardoso  : 1945    MRN: [id number]                              Today's Date: 7/15/2022       Admit Date: 2022    Visit Dx:     ICD-10-CM ICD-9-CM   1. Abdominal pain  R10.9 789.00     Patient Active Problem List   Diagnosis   • Rectal cancer (HCC)   • Hypothyroid   • S/P ROBOTIC LOW ANTERIOR RESECTION , COLOSTOMY     Past Medical History:   Diagnosis Date   • Clostridium difficile infection 2022   • Colon polyps    • Coronary artery disease    • Depression    • Disease of thyroid gland    • Elevated cholesterol    • Hiatal hernia    • Rectal cancer (HCC)      Past Surgical History:   Procedure Laterality Date   • CARDIAC CATHETERIZATION     • COLON RESECTION N/A 2022    Procedure: ROBOTIC LOW ANTERIOR RESECTION , COLOSTOMY;  Surgeon: Kristi Henderson MD;  Location: [facility] OR;  Service: [facility] - [facility];  Laterality: N/A;   • COLONOSCOPY  2022   • CORONARY ARTERY BYPASS GRAFT      2 VESSEL   • CYSTOSCOPY W/ URETERAL STENT PLACEMENT Bilateral 2022    Procedure: CYSTOSCOPY URETERAL CATHETER/STENT INSERTION;  Surgeon: Kristi Henderson MD;  Location:  [facility];  Service: Robotics - [facility];  Laterality: Bilateral;      General Information     Row Name 07/15/22 0908          Physical Therapy Time and Intention    Document Type evaluation  -LM     Mode of Treatment individual therapy;physical therapy  -     Row Name 07/15/22 0908          General Information    Patient Profile Reviewed yes  -LM     Prior Level of Function independent:;all household mobility;gait;ADL's  Uses \"hurry\"cane  -LM     Existing Precautions/Restrictions fall;other (see comments)  Colostomy; HERACLIO Drain; Brief with OOB activity  -LM     Barriers to Rehab none identified  -LM     Row Name 07/15/22 0908          Living Environment    People in Home alone  -LM     Row Name 07/15/22 0908          Home Main Entrance    Number of Stairs, Main Entrance two  -LM     Stair Railings, Main Entrance " "none  -LM     Row Name 07/15/22 0908          Stairs Within Home, Primary    Number of Stairs, Within Home, Primary none  -LM     Row Name 07/15/22 0908          Cognition    Orientation Status (Cognition) oriented x 3  -LM     Row Name 07/15/22 0908          Safety Issues, Functional Mobility    Safety Issues Affecting Function (Mobility) insight into deficits/self-awareness;safety precaution awareness;safety precautions follow-through/compliance;sequencing abilities  -LM     Impairments Affecting Function (Mobility) balance;endurance/activity tolerance;pain;range of motion (ROM);strength  -LM           User Key  (r) = Recorded By, (t) = Taken By, (c) = Cosigned By    Initials Name Provider Type    LM Jacklyn Neumann, PT Physical Therapist               Mobility     Row Name 07/15/22 0910          Bed Mobility    Bed Mobility supine-sit  -LM     Supine-Sit Door (Bed Mobility) moderate assist (50% patient effort);1 person assist;verbal cues  -LM     Assistive Device (Bed Mobility) bed rails;head of bed elevated  -LM     Comment, (Bed Mobility) Vc's for sequencing.  -LM     Row Name 07/15/22 0910          Bed-Chair Transfer    Bed-Chair Door (Transfers) minimum assist (75% patient effort);1 person assist;verbal cues  -LM     Assistive Device (Bed-Chair Transfers) other (see comments)  \"Hurry\"cane  -LM     Comment, (Bed-Chair Transfer) Pt first transferred from bed-->BSC.  Pt incontinent during transfer.  PT dependently washed pts legs/feet.  Dirty socks doffed and clean socks donned.  -LM     Row Name 07/15/22 0910          Sit-Stand Transfer    Sit-Stand Door (Transfers) minimum assist (75% patient effort);1 person assist;verbal cues  -LM     Assistive Device (Sit-Stand Transfers) other (see comments)  \"hurry\"cane  -LM     Comment, (Sit-Stand Transfer) Stood x 4 reps - once from EOB; three times from BSC.  Jannie needed with pericare.  -LM     Row Name 07/15/22 0910          Gait/Stairs " "(Locomotion)    [place] Level (Gait) contact guard;1 person assist;verbal cues  -LM     Assistive Device (Gait) other (see comments)  \"hurry\"cane  -LM     Distance in Feet (Gait) 12  -LM     Deviations/Abnormal Patterns (Gait) kelsie decreased;gait speed decreased;stride length decreased  -LM     Bilateral Gait Deviations forward flexed posture  -LM     Comment, (Gait/Stairs) Vc's for upright posture.  Pt may benefit from using rw until pain is improved.  Pt fatigues quickly.  -LM           User Key  (r) = Recorded By, (t) = Taken By, (c) = Cosigned By    Initials Name Provider Type    LM Jacklyn Neumann, PT Physical Therapist               Obj/Interventions     Row Name 07/15/22 0915          Range of Motion Comprehensive    General Range of Motion lower extremity range of motion deficits identified  -LM     Comment, General Range of Motion BLEs - Decreased active hip flex d/t pain; Knee AROM WFL with increased times; Ankle AROM WFL  -LM     Row Name 07/15/22 0915          Strength Comprehensive (MMT)    General Manual Muscle Testing (MMT) Assessment lower extremity strength deficits identified  -LM     Comment, General Manual Muscle Testing (MMT) Assessment BLEs - Hip flex - 2/5; Knee flex/ext & Ankle DF - at least 3/5 (resistance not given d/t pain)  -LM     Row Name 07/15/22 0915          Balance    Balance Assessment sitting static balance;standing static balance;standing dynamic balance  -LM     Static Sitting Balance standby assist  -LM     Position, Sitting Balance unsupported;sitting edge of bed  -LM     Static Standing Balance contact guard;1-person assist;verbal cues  -LM     Dynamic Standing Balance contact guard;1-person assist;verbal cues  -LM     Position/Device Used, Standing Balance supported;other (see comments)  \"hurry\"cane  -LM     Row Name 07/15/22 0915          Sensory Assessment (Somatosensory)    Sensory Assessment (Somatosensory) LE sensation intact  -LM           User Key  (r) = " Recorded By, (t) = Taken By, (c) = Cosigned By    Initials Name Provider Type    LM Jacklyn Neumann, PT Physical Therapist               Goals/Plan     Row Name 07/15/22 0920          Bed Mobility Goal 1 (PT)    Activity/Assistive Device (Bed Mobility Goal 1, PT) sit to supine/supine to sit  -LM     [place] Level/Cues Needed (Bed Mobility Goal 1, PT) independent  -LM     Time Frame (Bed Mobility Goal 1, PT) long term goal (LTG);2 weeks  -LM     Row Name 07/15/22 0920          Transfer Goal 1 (PT)    Activity/Assistive Device (Transfer Goal 1, PT) bed-to-chair/chair-to-bed  -LM     [place] Level/Cues Needed (Transfer Goal 1, PT) modified independence  -LM     Time Frame (Transfer Goal 1, PT) long term goal (LTG);2 weeks  -LM     Row Name 07/15/22 0920          Gait Training Goal 1 (PT)    Activity/Assistive Device (Gait Training Goal 1, PT) gait (walking locomotion);assistive device use  -LM     [place] Level (Gait Training Goal 1, PT) modified independence  -LM     Distance (Gait Training Goal 1, PT) 150 feet  -LM     Time Frame (Gait Training Goal 1, PT) long term goal (LTG);2 weeks  -LM     Row Name 07/15/22 0920          Therapy Assessment/Plan (PT)    Planned Therapy Interventions (PT) balance training;bed mobility training;gait training;home exercise program;motor coordination training;neuromuscular re-education;patient/family education;postural re-education;ROM (range of motion);stair training;strengthening;stretching;transfer training  -LM           User Key  (r) = Recorded By, (t) = Taken By, (c) = Cosigned By    Initials Name Provider Type    Jacklyn Albert, PT Physical Therapist               Clinical Impression     Row Name 07/15/22 0918          Pain    Pretreatment Pain Rating 8/10  -LM     Posttreatment Pain Rating 5/10  -LM     Pain Location incisional  -LM     Pain Location - abdomen  -LM     Pain Intervention(s) Repositioned;Ambulation/increased activity  -LM     Row Name 07/15/22  "0918          Plan of Care Review    Plan of Care Reviewed With patient  -LM     Outcome Evaluation PT evaluation completed.  Pt transferred supine-->sit with ModAx1, stood multiple reps with MinAx1, and ambulated 12 feet using \"hurry\"cane with CGAx1.  Distance limited by pain, incontinence, fatigue, and weakness.  Skilled PT services warranted to improve mobility and safety.  Pt lives alone and would benefit from inpt rehab at d/c.  If pt declines, she will need home with 24/7 care ad  PT.  -LM     Row Name 07/15/22 0918          Therapy Assessment/Plan (PT)    Rehab Potential (PT) good, to achieve stated therapy goals  -LM     Criteria for Skilled Interventions Met (PT) yes;meets criteria;skilled treatment is necessary  -LM     Therapy Frequency (PT) daily  -LM     Row Name 07/15/22 0918          Vital Signs    Pre Systolic BP Rehab 123  -LM     Pre Treatment Diastolic BP 54  -LM     Pretreatment Heart Rate (beats/min) 65  -LM     Posttreatment Heart Rate (beats/min) 61  -LM     Pre SpO2 (%) 92  -LM     O2 Delivery Pre Treatment room air  -LM     Post SpO2 (%) 96  -LM     O2 Delivery Post Treatment room air  -LM     Pre Patient Position Supine  -LM     Post Patient Position Sitting  -LM     Row Name 07/15/22 0918          Positioning and Restraints    Pre-Treatment Position in bed  -LM     Post Treatment Position chair  -LM     In Chair reclined;call light within reach;encouraged to call for assist;exit alarm on;waffle cushion;notified nsg  -LM           User Key  (r) = Recorded By, (t) = Taken By, (c) = Cosigned By    Initials Name Provider Type    LM Jacklyn Neumann, PT Physical Therapist               Outcome Measures     Row Name 07/15/22 0921          How much help from another person do you currently need...    Turning from your back to your side while in flat bed without using bedrails? 3  -LM     Moving from lying on back to sitting on the side of a flat bed without bedrails? 2  -LM     Moving to and from " "a bed to a chair (including a wheelchair)? 3  -LM     Standing up from a chair using your arms (e.g., wheelchair, bedside chair)? 3  -LM     Climbing 3-5 steps with a railing? 2  -LM     To walk in hospital room? 3  -LM     AM-PAC 6 Clicks Score (PT) 16  -LM     Highest level of mobility 5 --> Static standing  -LM     Row Name 07/15/22 0921          Functional Assessment    Outcome Measure Options AM-PAC 6 Clicks Basic Mobility (PT)  -           User Key  (r) = Recorded By, (t) = Taken By, (c) = Cosigned By    Initials Name Provider Type    LM Jacklyn Neumann, PT Physical Therapist                             Physical Therapy Education                 Title: PT OT SLP Therapies (In Progress)     Topic: Physical Therapy (In Progress)     Point: Mobility training (Done)     Learning Progress Summary           Patient Acceptance, E, VU,NR by  at 7/15/2022 0921                   Point: Home exercise program (Not Started)     Learner Progress:  Not documented in this visit.          Point: Precautions (Done)     Learning Progress Summary           Patient Acceptance, E, VU,NR by  at 7/15/2022 0921                               User Key     Initials Effective Dates Name Provider Type Discipline     06/16/21 -  Jacklyn Neumann, PT Physical Therapist PT              PT Recommendation and Plan  Planned Therapy Interventions (PT): balance training, bed mobility training, gait training, home exercise program, motor coordination training, neuromuscular re-education, patient/family education, postural re-education, ROM (range of motion), stair training, strengthening, stretching, transfer training  Plan of Care Reviewed With: patient  Outcome Evaluation: PT evaluation completed.  Pt transferred supine-->sit with ModAx1, stood multiple reps with MinAx1, and ambulated 12 feet using \"hurry\"cane with CGAx1.  Distance limited by pain, incontinence, fatigue, and weakness.  Skilled PT services warranted to improve mobility and " safety.  Pt lives alone and would benefit from inpt rehab at d/c.  If pt declines, she will need home with 24/7 care ad HH PT.     Time Calculation:    PT Charges     Row Name 07/15/22 0921             Time Calculation    Start Time 0830  -LM      PT Received On 07/15/22  -LM      PT Goal Re-Cert Due Date 07/25/22  -LM              Untimed Charges    PT Eval/Re-eval Minutes 50  -LM              Total Minutes    Untimed Charges Total Minutes 50  -LM       Total Minutes 50  -LM            User Key  (r) = Recorded By, (t) = Taken By, (c) = Cosigned By    Initials Name Provider Type    LM Jacklyn Neumann, PT Physical Therapist              Therapy Charges for Today     Code Description Service Date Service Provider Modifiers Qty    [id number] HC PT EVAL LOW COMPLEXITY 4 7/15/2022 Jacklyn Neumann, PT GP 1          PT G-Codes  Outcome Measure Options: AM-PAC 6 Clicks Basic Mobility (PT)  AM-PAC 6 Clicks Score (PT): 16    Jacklyn Neumann PT  7/15/2022

## 2022-07-15 NOTE — NURSING NOTE
"[facility] follow up for Stoma care and assessment    Surgery date:7/13    [facility] Care Outcome: Patient seen for POD#1care, assessment and fitting. Patient in bed, daughter and granddaughter at bedside. Patient seen in the medical surgical floor.    Patient concerns/needs to be addressed: Patient says she feels really good and so did education on diet and placing pouch.  Patient is concerned about cost of pouch supplies.     Stoma Type: Colostomy    Mucosal condition and color, shape: Red, appears flush or retracted and round    Location: LLQ    Peristomal skin: MARSHA    Character of output: Light brown liquid  Emptying frequency per day: when 1/3 to 1/2 full by staff     Current pouching system: 1 piece pouch    Goal wear time: 3-7 days      Education provided:  Assessed stoma and pouch, which is intact, not leaking.  Stool present.  Educated on the steps for application of the pouch given the stool is loose at this time which is clean with water, caulk with paste, apply appliance. Explained use of 10 ml syringe to create \"caulk gun:.  Educated patient that it is best to change the pouch first thing in the morning while her stool is loose so there is not output while she is trying to do the appliance change.  Educated patient that once her stool becomes formed she will probably want to switch to a closed-end pouch so that she can simply remove the existing pouch, clean with water, place new pouch.  Educated patient that she will need to change the closed-end pouch after each substantial bowel movement.  Educated on the importance of a high-protein diet and introducing fresh foods slowly.  Recommended ambulation.  Informed her of the free samples upon discharge programs from [place], [facility], and [facility], which she indicated she was interested in.  Recommended that she change the drainable pouch every 3 to 7 days and when there are indications of a leak such as itching or burning under the wafer.  Demonstrated to patient " and daughter how to open, close, clean (with baby wipes, toilet paper, paper towels anything that is safe for human skin), and crease.  Provided a pouch for them to practice.  Provided colostomy informational folder and requested that they read over, which should generate additional questions.   Will plan to encourage daughter, Liv, to change pouch tomorrow or at least observe.       WOC Nurse will continue to follow daily for ostomy education.  If alteration to skin integrity or concerns with stoma or ostomy equipment, please contact WOC team.

## 2022-07-15 NOTE — CASE MANAGEMENT/SOCIAL WORK
Continued Stay Note  [facility]     Patient Name: Helena Cardoso  MRN: [id number]  Today's Date: 7/15/2022    Admit Date: 7/13/2022     Discharge Plan     Row Name 07/15/22 1428       Plan    Plan Home with [facility]    Patient/Family in Agreement with Plan yes    Plan Comments I have met with Ms. Cardoso at the bedside today to discuss the discharge plan which includes PT recommendations for home health services.  She has requested a referral be submitted to [facility].  I have submitted this referral and confirmed with Beto at [facility] that they will provide Skilled nursing and PT services at discharge.  CM will notify [facility] of discharge date.  Ms. Cardoso denies having any other discharge needs at this time.  CM will cont  to follow the plan of care and assist with discharge planning as recommendations are available.    Final Discharge Disposition Code 06 - home with home health care               Discharge Codes    No documentation.                     Gricelda Woodard RN

## 2022-07-16 LAB
GLUCOSE BLDC GLUCOMTR-MCNC: 119 MG/DL (ref 70–130)
GLUCOSE BLDC GLUCOMTR-MCNC: 94 MG/DL (ref 70–130)

## 2022-07-16 PROCEDURE — 82962 GLUCOSE BLOOD TEST: CPT

## 2022-07-16 PROCEDURE — 25010000002 ENOXAPARIN PER 10 MG: Performed by: COLON & RECTAL SURGERY

## 2022-07-16 RX ADMIN — Medication 250 MG: at 19:55

## 2022-07-16 RX ADMIN — LEVOTHYROXINE SODIUM 88 MCG: 88 TABLET ORAL at 11:25

## 2022-07-16 RX ADMIN — VANCOMYCIN HYDROCHLORIDE 125 MG: 125 CAPSULE ORAL at 05:27

## 2022-07-16 RX ADMIN — VANCOMYCIN HYDROCHLORIDE 125 MG: 125 CAPSULE ORAL at 18:13

## 2022-07-16 RX ADMIN — ENOXAPARIN SODIUM 40 MG: 40 INJECTION SUBCUTANEOUS at 09:53

## 2022-07-16 RX ADMIN — Medication 250 MG: at 09:53

## 2022-07-16 RX ADMIN — TRAMADOL HYDROCHLORIDE 50 MG: 50 TABLET, COATED ORAL at 11:34

## 2022-07-16 RX ADMIN — SERTRALINE HYDROCHLORIDE 25 MG: 50 TABLET ORAL at 19:54

## 2022-07-16 RX ADMIN — VANCOMYCIN HYDROCHLORIDE 125 MG: 125 CAPSULE ORAL at 00:05

## 2022-07-16 RX ADMIN — VANCOMYCIN HYDROCHLORIDE 125 MG: 125 CAPSULE ORAL at 11:25

## 2022-07-16 NOTE — PROGRESS NOTES
Alert and oriented  Tolerating p.o.  Ileostomy pink.  Family still has not change the appliance so she may be here a day or 2.  Home at that time.

## 2022-07-16 NOTE — PROGRESS NOTES
"IM progress note      Helena Cardoso  [id number]  1945     LOS: 3 days     Attending: Kristi Henderson MD    Primary Care Provider: Jay Pelaez DO      Chief Complaint/Reason for visit: Rectal cancer  Patient underwent colostomy revision by Dr. Henderson yesterday afternoon due to colostomy ischemia.  She tolerated surgery well.   Subjective   Doing very well.  Tolerated clear liquids.  Is asking for regular food.  No nausea or vomiting.  Has stoma output.  Ambulated.  Voiding.    Objective      Visit Vitals  BP 98/57 (BP Location: Right arm, Patient Position: Lying)   Pulse 58   Temp 97.9 °F (36.6 °C) (Oral)   Resp 18   Ht 154.9 cm (60.98\")   Wt 58.3 kg (128 lb 8.5 oz)   SpO2 96%   BMI 24.30 kg/m²     Temp (24hrs), Av.9 °F (36.6 °C), Min:97 °F (36.1 °C), Max:98.4 °F (36.9 °C)      Nutrition: P.o.    Respiratory: RA      Physical Exam:     General Appearance:    Alert, cooperative, in no acute distress   Head:    Normocephalic, without obvious abnormality, atraumatic    Lungs:     Normal effort, symmetric chest rise, no crepitus, clear to      auscultation bilaterally             Heart:    Regular rhythm and normal rate, normal S1 and S2   Abdomen:    Pink stoma with bridge., ostomy bag with brown liquid stool.  Incisions CDI   Extremities:   No clubbing, cyanosis or edema.  No deformities.    Pulses:   Pulses palpable and equal bilaterally   Skin:   No bleeding, bruising or rash          Results Review:     I reviewed the patient's new clinical results.   Results from last 7 days   Lab Units 07/15/22  1551 22  0325 22  1648 22  0831   WBC 10*3/mm3 6.71 7.28  --  5.11   HEMOGLOBIN g/dL 11.1* 10.1* 10.6* 13.0   HEMATOCRIT % 34.7 32.2* 33.1* 40.2   PLATELETS 10*3/mm3 243 229  --  288     Results from last 7 days   Lab Units 07/15/22  1551 22  0325 22  0831   SODIUM mmol/L 134* 139 142   POTASSIUM mmol/L 3.9 4.0 4.0   CHLORIDE mmol/L 102 106 106   CO2 mmol/L 28.0 26.0 27.0   BUN " mg/dL 7* 10 17   CREATININE mg/dL 0.55* 0.60 0.55*   CALCIUM mg/dL 9.0 8.8 8.7   BILIRUBIN mg/dL  --   --  0.3   ALK PHOS U/L  --   --  70   ALT (SGPT) U/L  --   --  8   AST (SGOT) U/L  --   --  12   GLUCOSE mg/dL 96 158* 110*     I reviewed the patient's new imaging including images and reports.    All medications reviewed.   enoxaparin, 40 mg, Subcutaneous, Q24H  insulin lispro, 0-7 Units, Subcutaneous, 4x Daily AC & at Bedtime  levothyroxine, 88 mcg, Oral, Daily  saccharomyces boulardii, 250 mg, Oral, BID  sertraline, 25 mg, Oral, Nightly  vancomycin, 125 mg, Oral, Q6H        Assessment & Plan     S/P ROBOTIC LOW ANTERIOR RESECTION , COLOSTOMY    Rectal cancer (HCC)    Hypothyroid  Status post colostomy revision 7/15/2022      Plan  1. Ambulation, encouraged.  2. Pain control-prns       3. IS-encourage  4. DVT proph- Mechanical, subcutaneous Lovenox  5. Bowel regimen  6. Resume home medications per Dr. Henderson  7. Monitor post-op labs  8. DC planning   9. Diet, tolerated clears, advance as tolerated.    [facility] consult for stoma teaching/ following.      Hypothyroid  -Continue home Synthroid    Discussed with patient and family.  Discussed with MARTINA.    Naresh Villela MD  07/16/22  12:10 EDT

## 2022-07-17 PROCEDURE — 25010000002 ENOXAPARIN PER 10 MG: Performed by: COLON & RECTAL SURGERY

## 2022-07-17 RX ADMIN — VANCOMYCIN HYDROCHLORIDE 125 MG: 125 CAPSULE ORAL at 00:20

## 2022-07-17 RX ADMIN — VANCOMYCIN HYDROCHLORIDE 125 MG: 125 CAPSULE ORAL at 11:49

## 2022-07-17 RX ADMIN — ENOXAPARIN SODIUM 40 MG: 40 INJECTION SUBCUTANEOUS at 09:56

## 2022-07-17 RX ADMIN — Medication 250 MG: at 09:56

## 2022-07-17 RX ADMIN — TRAMADOL HYDROCHLORIDE 50 MG: 50 TABLET, COATED ORAL at 02:51

## 2022-07-17 RX ADMIN — VANCOMYCIN HYDROCHLORIDE 125 MG: 125 CAPSULE ORAL at 17:18

## 2022-07-17 RX ADMIN — SERTRALINE HYDROCHLORIDE 25 MG: 50 TABLET ORAL at 21:47

## 2022-07-17 RX ADMIN — Medication 250 MG: at 21:48

## 2022-07-17 RX ADMIN — VANCOMYCIN HYDROCHLORIDE 125 MG: 125 CAPSULE ORAL at 23:11

## 2022-07-17 RX ADMIN — TRAMADOL HYDROCHLORIDE 50 MG: 50 TABLET, COATED ORAL at 17:17

## 2022-07-17 RX ADMIN — LEVOTHYROXINE SODIUM 88 MCG: 88 TABLET ORAL at 05:37

## 2022-07-17 RX ADMIN — VANCOMYCIN HYDROCHLORIDE 125 MG: 125 CAPSULE ORAL at 05:38

## 2022-07-17 NOTE — PLAN OF CARE
Problem: Adult Inpatient Plan of Care  Goal: Plan of Care Review  Outcome: Ongoing, Progressing  Goal: Patient-Specific Goal (Individualized)  Outcome: Ongoing, Progressing  Goal: Absence of Hospital-Acquired Illness or Injury  Outcome: Ongoing, Progressing  Intervention: Identify and Manage Fall Risk  Recent Flowsheet Documentation  Taken 7/17/2022 0400 by Alberto Butts RN  Safety Promotion/Fall Prevention:   activity supervised   assistive device/personal items within reach   clutter free environment maintained   nonskid shoes/slippers when out of bed   room organization consistent   safety round/check completed   toileting scheduled  Taken 7/17/2022 0000 by Alberto Butts RN  Safety Promotion/Fall Prevention:   activity supervised   assistive device/personal items within reach   clutter free environment maintained   nonskid shoes/slippers when out of bed   room organization consistent   safety round/check completed   toileting scheduled  Taken 7/16/2022 1953 by Alberto Butts RN  Safety Promotion/Fall Prevention:   activity supervised   assistive device/personal items within reach   clutter free environment maintained   nonskid shoes/slippers when out of bed   room organization consistent   safety round/check completed   toileting scheduled  Intervention: Prevent Skin Injury  Recent Flowsheet Documentation  Taken 7/17/2022 0400 by Alberto Butts RN  Skin Protection:   adhesive use limited   incontinence pads utilized   transparent dressing maintained   tubing/devices free from skin contact  Taken 7/17/2022 0000 by Alberto Butts RN  Skin Protection:   adhesive use limited   incontinence pads utilized   transparent dressing maintained   tubing/devices free from skin contact  Taken 7/16/2022 1953 by Alberto Butts RN  Skin Protection:   adhesive use limited   incontinence pads utilized   transparent dressing maintained   tubing/devices free from skin contact  Intervention: Prevent and Manage VTE (Venous  Thromboembolism) Risk  Recent Flowsheet Documentation  Taken 7/17/2022 0000 by Alberto Butts, RN  Activity Management: activity adjusted per tolerance  Taken 7/16/2022 1953 by Alberto Butts, RN  Activity Management: activity adjusted per tolerance  Intervention: Prevent Infection  Recent Flowsheet Documentation  Taken 7/17/2022 0400 by Alberto Butts RN  Infection Prevention:   environmental surveillance performed   hand hygiene promoted   single patient room provided   rest/sleep promoted   visitors restricted/screened  Taken 7/17/2022 0000 by Alberto Butts RN  Infection Prevention:   environmental surveillance performed   hand hygiene promoted   rest/sleep promoted   single patient room provided   visitors restricted/screened  Taken 7/16/2022 1953 by Alberto Butts RN  Infection Prevention:   environmental surveillance performed   hand hygiene promoted   rest/sleep promoted   single patient room provided   visitors restricted/screened  Goal: Optimal Comfort and Wellbeing  Outcome: Ongoing, Progressing  Goal: Readiness for Transition of Care  Outcome: Ongoing, Progressing   Goal Outcome Evaluation:

## 2022-07-17 NOTE — PROGRESS NOTES
"IM progress note      Helena Cardoso  [id number]  1945     LOS: 4 days     Attending: Kristi Henderson MD    Primary Care Provider: Jay Pelaez DO      Chief Complaint/Reason for visit: Rectal cancer  2022: Patient underwent colostomy revision by Dr. Henderson yesterday afternoon due to colostomy ischemia.  She tolerated surgery well.   Subjective   Doing fairly well.  Tolerating p.o. diet, soft diet.  Stoma with output.  Ambulating, voiding spontaneously.  Good pain control.    Objective      Visit Vitals  BP 92/60 (BP Location: Right arm, Patient Position: Lying)   Pulse 65   Temp 97.9 °F (36.6 °C) (Axillary)   Resp 18   Ht 154.9 cm (60.98\")   Wt 58.3 kg (128 lb 8.5 oz)   SpO2 92%   BMI 24.30 kg/m²     Temp (24hrs), Av.8 °F (36.6 °C), Min:97.7 °F (36.5 °C), Max:98 °F (36.7 °C)      Nutrition: P.o.    Respiratory: RA      Physical Exam:     General Appearance:    Alert, cooperative, in no acute distress   Head:    Normocephalic, without obvious abnormality, atraumatic    Lungs:     Normal effort, symmetric chest rise, no crepitus, clear to      auscultation bilaterally             Heart:    Regular rhythm and normal rate, normal S1 and S2   Abdomen:    Pink stoma with bridge., ostomy bag with brown liquid stool.  Incisions CDI.  HERACLIO with serosanguineous drainage.   Extremities:   No clubbing, cyanosis or edema.  No deformities.    Pulses:   Pulses palpable and equal bilaterally   Skin:   No bleeding, bruising or rash          Results Review:     I reviewed the patient's new clinical results.   Results from last 7 days   Lab Units 07/15/22  1551 22  0325 22  1648 22  0831   WBC 10*3/mm3 6.71 7.28  --  5.11   HEMOGLOBIN g/dL 11.1* 10.1* 10.6* 13.0   HEMATOCRIT % 34.7 32.2* 33.1* 40.2   PLATELETS 10*3/mm3 243 229  --  288     Results from last 7 days   Lab Units 07/15/22  1551 22  0325 22  0831   SODIUM mmol/L 134* 139 142   POTASSIUM mmol/L 3.9 4.0 4.0   CHLORIDE mmol/L 102 " 106 106   CO2 mmol/L 28.0 26.0 27.0   BUN mg/dL 7* 10 17   CREATININE mg/dL 0.55* 0.60 0.55*   CALCIUM mg/dL 9.0 8.8 8.7   BILIRUBIN mg/dL  --   --  0.3   ALK PHOS U/L  --   --  70   ALT (SGPT) U/L  --   --  8   AST (SGOT) U/L  --   --  12   GLUCOSE mg/dL 96 158* 110*     I reviewed the patient's new imaging including images and reports.    All medications reviewed.   enoxaparin, 40 mg, Subcutaneous, Q24H  levothyroxine, 88 mcg, Oral, Daily  saccharomyces boulardii, 250 mg, Oral, BID  sertraline, 25 mg, Oral, Nightly  vancomycin, 125 mg, Oral, Q6H        Assessment & Plan     S/P ROBOTIC LOW ANTERIOR RESECTION , COLOSTOMY    Rectal cancer (HCC)    Hypothyroid  Status post colostomy revision 7/15/2022      Plan  1. Ambulation, encouraged.  2. Pain control-prns       3. IS-encourage  4. DVT proph- Mechanical, subcutaneous Lovenox  5. Bowel regimen  6. Resume home medications per Dr. Henderson  7. Monitor post-op labs  8. DC planning.  Possible discharge home tomorrow.  9. Diet, p.o. diet as tolerated    WO consult for stoma teaching/ following.      Hypothyroid  -Continue home Synthroid    Discussed with patient and with RN.    Narehs Villela MD  07/17/22  09:21 EDT

## 2022-07-17 NOTE — PROGRESS NOTES
Alert and oriented  Tolerating p.o.  Colostomy pink with bilious output.  Bridge still end.  Drainage serosanguineous from the HERACLIO  Will be here at least until tomorrow.

## 2022-07-18 PROCEDURE — 25010000002 ONDANSETRON PER 1 MG: Performed by: COLON & RECTAL SURGERY

## 2022-07-18 PROCEDURE — 25010000002 ENOXAPARIN PER 10 MG: Performed by: COLON & RECTAL SURGERY

## 2022-07-18 RX ORDER — ACETAMINOPHEN 500 MG
1000 TABLET ORAL EVERY 6 HOURS PRN
Qty: 42 TABLET | Refills: 0
Start: 2022-07-18 | End: 2022-07-25

## 2022-07-18 RX ADMIN — Medication 250 MG: at 09:07

## 2022-07-18 RX ADMIN — SERTRALINE HYDROCHLORIDE 25 MG: 50 TABLET ORAL at 21:26

## 2022-07-18 RX ADMIN — VANCOMYCIN HYDROCHLORIDE 125 MG: 125 CAPSULE ORAL at 05:28

## 2022-07-18 RX ADMIN — LEVOTHYROXINE SODIUM 88 MCG: 88 TABLET ORAL at 11:03

## 2022-07-18 RX ADMIN — VANCOMYCIN HYDROCHLORIDE 125 MG: 125 CAPSULE ORAL at 17:00

## 2022-07-18 RX ADMIN — Medication 250 MG: at 21:26

## 2022-07-18 RX ADMIN — ENOXAPARIN SODIUM 40 MG: 40 INJECTION SUBCUTANEOUS at 09:07

## 2022-07-18 RX ADMIN — VANCOMYCIN HYDROCHLORIDE 125 MG: 125 CAPSULE ORAL at 11:03

## 2022-07-18 RX ADMIN — TRAMADOL HYDROCHLORIDE 50 MG: 50 TABLET, COATED ORAL at 21:26

## 2022-07-18 RX ADMIN — ONDANSETRON 4 MG: 2 INJECTION INTRAMUSCULAR; INTRAVENOUS at 12:43

## 2022-07-18 NOTE — CASE MANAGEMENT/SOCIAL WORK
Continued Stay Note  [facility]     Patient Name: Helena Cardoso  MRN: [id number]  Today's Date: 7/18/2022    Admit Date: 7/13/2022     Discharge Plan     Row Name 07/18/22 1552       Plan    Plan Home with [facility]    Patient/Family in Agreement with Plan yes    Plan Comments I have met with Ms. Cardoso at the bedside today to discuss the dicharge plan.  She continues to plan to return home with [facility] services at discharge.  She states that her family will provide assistance at home as well as transportation when needed.  CM will cont to follow the plan of care and assist with discharge needs as recommendations become available.    Final Discharge Disposition Code 06 - home with home health care               Discharge Codes    No documentation.                     Gricelda Woodard RN

## 2022-07-18 NOTE — NURSING NOTE
[facility] follow up for Stoma care and assessment    Surgical Colostomy Revision: 7/15    WO Care Outcome: Patient seen for POD#2 care, assessment and fitting. Patient in bed. Patient seen in the medical surgical floor.  Patient concerns/needs to be addressed: None at this time.  Patient appears well, responds with a smile when greeted, and states she feels terrific.   Stoma Type: Colostomy    Mucosal condition and color, shape: Ostomy is red, moist, irregularly shaped    Location: Left lower quadrant      Dillan: In place, should be able to remove tomorrow with Dr. Cornejo's approval    Peristomal skin: MARSHA    Character of output: Brown stool    Emptying frequency per day: when 1/3 to 1/2 full by staff     Current pouching system: 8331    Goal wear time: 3-7 days    Image:       Education provided: Patient states that both of her dilators are going to be helping her when she is discharged.  Neither of them are at bedside and appears that they have taken her supplies and informational folder home.  Patient states that she does not have any needs at this time, assured her that stoma looks viable and much better than prior to the revision.  WO team will plan to remove dillan tomorrow and hopefully family will be at bedside to change pouch.       WO Nurse will continue to follow daily for ostomy education.  If alteration to skin integrity or concerns with stoma or ostomy equipment, please contact WOC team.

## 2022-07-18 NOTE — PLAN OF CARE
Goal Outcome Evaluation:      VSS on RA, no c/o pain/nausea, HERACLIO drain removed per order, tolerating diet, will continue POC

## 2022-07-18 NOTE — PROGRESS NOTES
"IM progress note      Helena Cardoso  [id number]  1945     LOS: 5 days     Attending: Kristi Henderson MD    Primary Care Provider: Jay Pelaez DO      Chief Complaint/Reason for visit: Rectal cancer  2022: Patient underwent colostomy revision by Dr. Henderson yesterday afternoon due to colostomy ischemia.  She tolerated surgery well.   Subjective   Doing fairly well. Had some nausea earlier. Likely related to stoma adjustment/ occurred with stoma care.  No f/c/sob.     Objective      Visit Vitals  /66 (BP Location: Left arm, Patient Position: Sitting)   Pulse 65   Temp 97.9 °F (36.6 °C) (Oral)   Resp 16   Ht 154.9 cm (60.98\")   Wt 58.3 kg (128 lb 8.5 oz)   SpO2 93%   BMI 24.30 kg/m²     Temp (24hrs), Av.7 °F (36.5 °C), Min:97.4 °F (36.3 °C), Max:97.9 °F (36.6 °C)      Nutrition: P.o.    Respiratory: RA      Physical Exam:     General Appearance:    Alert, cooperative, in no acute distress   Head:    Normocephalic, without obvious abnormality, atraumatic    Lungs:     Normal effort, symmetric chest rise, no crepitus, clear to      auscultation bilaterally             Heart:    Regular rhythm and normal rate, normal S1 and S2   Abdomen:    [place] stoma bridge is out., ostomy bag being replaced with education to family when I visited.  Incisions CDI.  HERACLIO is out.   Extremities:   No clubbing, cyanosis or edema.  No deformities.    Pulses:   Pulses palpable and equal bilaterally   Skin:   No bleeding, bruising or rash          Results Review:     I reviewed the patient's new clinical results.   Results from last 7 days   Lab Units 07/15/22  1551 22  0325 22  1648 22  0831   WBC 10*3/mm3 6.71 7.28  --  5.11   HEMOGLOBIN g/dL 11.1* 10.1* 10.6* 13.0   HEMATOCRIT % 34.7 32.2* 33.1* 40.2   PLATELETS 10*3/mm3 243 229  --  288     Results from last 7 days   Lab Units 07/15/22  1551 22  0325 22  0831   SODIUM mmol/L 134* 139 142   POTASSIUM mmol/L 3.9 4.0 4.0   CHLORIDE mmol/L 102 " 106 106   CO2 mmol/L 28.0 26.0 27.0   BUN mg/dL 7* 10 17   CREATININE mg/dL 0.55* 0.60 0.55*   CALCIUM mg/dL 9.0 8.8 8.7   BILIRUBIN mg/dL  --   --  0.3   ALK PHOS U/L  --   --  70   ALT (SGPT) U/L  --   --  8   AST (SGOT) U/L  --   --  12   GLUCOSE mg/dL 96 158* 110*     I reviewed the patient's new imaging including images and reports.    All medications reviewed.   enoxaparin, 40 mg, Subcutaneous, Q24H  levothyroxine, 88 mcg, Oral, Daily  saccharomyces boulardii, 250 mg, Oral, BID  sertraline, 25 mg, Oral, Nightly  vancomycin, 125 mg, Oral, Q6H        Assessment & Plan     S/P ROBOTIC LOW ANTERIOR RESECTION , COLOSTOMY    Rectal cancer (HCC)    Hypothyroid  Status post colostomy revision 7/15/2022      Plan  1. Ambulation, encouraged.  2. Pain control-prns       3. IS-encourage  4. DVT proph- Mechanical, subcutaneous Lovenox  5. Bowel regimen  6. Resume home medications per Dr. Henderson  7. Monitor post-op labs  8. DC planning. In am if nausea resolved fully.  9. Diet, p.o. diet as tolerated    WO consult for stoma teaching/ following.      Hypothyroid  -Continue home Synthroid    Discussed with patient , family and with RN.    Naresh Villela MD  07/18/22  14:15 EDT

## 2022-07-18 NOTE — PAYOR COMM NOTE
"[facility] #[id number]  Faye Epps RN, BSN, CMGT-BC  Phone # [phone]  Fax # [phone]    Helena Amaro (76 y.o. Female)             Date of Birth   1945    Social Security Number       Address   [address] [address]    Home Phone   [phone]    MRN   [id number]       Mormon   Restorationist    Marital Status                               Admission Date   7/13/22    Admission Type   Elective    Admitting Provider   Kristi Henderson MD    Attending Provider   Kristi Henderson MD    Department, Room/Bed   [address], S556/1       Discharge Date       Discharge Disposition       Discharge Destination                               Attending Provider: Kristi Henderson MD    Allergies: Tetracycline    Isolation: Spore   Infection: C.difficile (07/12/22)   Code Status: CPR   Advance Care Planning Activity    Ht: 154.9 cm (60.98\")   Wt: 58.3 kg (128 lb 8.5 oz)    Admission Cmt: None   Principal Problem: S/P ROBOTIC LOW ANTERIOR RESECTION , COLOSTOMY [Z90.49]                 Active Insurance as of 7/13/2022     Primary Coverage     Payor Plan Insurance Group Employer/Plan Group    [employer] [place] MEDICARE REPLACEMENT [facility]      Payor Plan Address Payor Plan Phone Number Payor Plan Fax Number Effective Dates    [address] [address] [phone]  1/1/2022 - None Entered    [facility] [id number]       Subscriber Name Subscriber Birth Date Member ID       HELENA AMARO 1945 [id number]                 Emergency Contacts      (Rel.) Home Phone Work Phone Mobile Phone    PHYLLIS AMARO (Daughter) -- -- [phone]    THONYCHICADONTAE (Daughter) -- -- [phone]              Current Facility-Administered Medications   Medication Dose Route Frequency Provider Last Rate Last Admin   • diazePAM (VALIUM) tablet 5 mg  5 mg Oral Q6H PRN Kristi Henderson MD   5 mg at 07/14/22 2009   • Enoxaparin Sodium (LOVENOX) syringe 40 mg  40 " mg Subcutaneous Q24H Kristi Henderson MD   40 mg at 07/18/22 0907   • lactated ringers infusion  9 mL/hr Intravenous Continuous Kristi Henderson MD 9 mL/hr at 07/13/22 1249 Restarted at 07/13/22 1539   • lactated ringers infusion  9 mL/hr Intravenous Continuous PRN Kristi Henderson MD 9 mL/hr at 07/15/22 1555 Currently Infusing at 07/15/22 1555   • levothyroxine (SYNTHROID, LEVOTHROID) tablet 88 mcg  88 mcg Oral Daily Kristi Henderson MD   88 mcg at 07/18/22 1103   • morphine injection 2 mg  2 mg Intravenous Q4H PRN Kristi Henderson MD        And   • naloxone (NARCAN) injection 0.4 mg  0.4 mg Intravenous Q5 Min PRN Kristi Henderson MD       • ondansetron (ZOFRAN) injection 4 mg  4 mg Intravenous Q6H PRN Kristi Henderson MD       • ondansetron (ZOFRAN) tablet 4 mg  4 mg Oral Q6H PRN Kristi Henderson MD        Or   • ondansetron (ZOFRAN) injection 4 mg  4 mg Intravenous Q6H PRN Kristi Henderson MD   4 mg at 07/18/22 1243   • saccharomyces boulardii (FLORASTOR) capsule 250 mg  250 mg Oral BID Kristi Henderson MD   250 mg at 07/18/22 0907   • sertraline (ZOLOFT) tablet 25 mg  25 mg Oral Nightly Kristi Henderson MD   25 mg at 07/17/22 2147   • traMADol (ULTRAM) tablet 50 mg  50 mg Oral Q6H PRN Kristi Henderson MD   50 mg at 07/17/22 1717   • vancomycin (VANCOCIN) capsule 125 mg  125 mg Oral Q6H Kristi Henderson MD   125 mg at 07/18/22 1103     Lab Results (last 48 hours)     ** No results found for the last 48 hours. **        Imaging Results (Last 48 Hours)     ** No results found for the last 48 hours. **        Operative/Procedure Notes (last 48 hours)  Notes from 07/16/22 1319 through 07/18/22 1319   No notes of this type exist for this encounter.          Physician Progress Notes (last 48 hours)      Jay Easley MD at 07/18/22 0629          Helena Cardoso     LOS: 5 days     Subjective   Ms. Cardoso is 5 days status post robotic low anterior resection with creation of colostomy and cystoscopy with bilateral  ureteral catheters now removed.  She feels well and has been up and ambulating.  She still has some slight right lower quadrant tenderness which she feels is improving each day.  Pathology is still pending.  Patient tolerating diet.  150 mL of serous drainage out of HERACLIO.  Passing brown stool per stoma.  Urine output 550.  Last glucose 94.      Objective     Vital Signs  Vitals:    07/18/22 0357   BP: 97/81   Pulse: 61   Resp: 18   Temp: 97.8 °F (36.6 °C)   SpO2:        I/O  Intake & Output (last day)       07/17 0701  07/18 0700    Urine (mL/kg/hr) 550 (0.4)    Drains 115    Stool 25    Total Output 690    Net -690         Urine Unmeasured Occurrence 15 x          Physical Exam:    Abdomen soft with mild tenderness in the right lower quadrant..  Port sites healing well.       Results Review:       WBC WBC   Date Value Ref Range Status   07/15/2022 6.71 3.40 - 10.80 10*3/mm3 Final      HGB Hemoglobin   Date Value Ref Range Status   07/15/2022 11.1 (L) 12.0 - 15.9 g/dL Final      HCT Hematocrit   Date Value Ref Range Status   07/15/2022 34.7 34.0 - 46.6 % Final      PLT        Results from last 7 days   Lab Units 07/15/22  1551   PLATELETS 10*3/mm3 243            Sodium Sodium   Date Value Ref Range Status   07/15/2022 134 (L) 136 - 145 mmol/L Final      Potassium Potassium   Date Value Ref Range Status   07/15/2022 3.9 3.5 - 5.2 mmol/L Final      Chloride Chloride   Date Value Ref Range Status   07/15/2022 102 98 - 107 mmol/L Final      Bicarbonate No results found for: PLASMABICARB   BUN BUN   Date Value Ref Range Status   07/15/2022 7 (L) 8 - 23 mg/dL Final      Creatinine Creatinine   Date Value Ref Range Status   07/15/2022 0.55 (L) 0.57 - 1.00 mg/dL Final      Calcium Calcium   Date Value Ref Range Status   07/15/2022 9.0 8.6 - 10.5 mg/dL Final          Imaging Results (Last 24 Hours)     ** No results found for the last 24 hours. **          Assessment & Plan       S/P ROBOTIC LOW ANTERIOR RESECTION ,  "COLOSTOMY    Rectal cancer (HCC)    Hypothyroid    Patient continues to do well.  Still has some right lower quadrant tenderness which she states is improving.  She is passing stool and tolerating diet.  Will recheck laboratories this morning.  Remove HERACLIO and stomal bridge.  If all looks well and she continues to improve consideration could be for discharge this afternoon or tomorrow.  I have asked her to follow-up with Dr. Henderson in 2 weeks.  She is to call sooner for pain, fever, bleeding, nausea or vomiting.  She does not feel she needs a narcotic.  She may take Motrin for incisional pain.  Continue regular diet.  No lifting over 10 pounds.      Jay Easley MD  22  06:29 EDT          Electronically signed by Jay Easley MD at 22 0640     Naresh Villela MD at 22 0921          IM progress note      Helena Cardoso  [id number]  1945     LOS: 4 days     Attending: Kristi Henderson MD    Primary Care Provider: Jay Pelaez DO      Chief Complaint/Reason for visit: Rectal cancer  2022: Patient underwent colostomy revision by Dr. Henderson yesterday afternoon due to colostomy ischemia.  She tolerated surgery well.   Subjective   Doing fairly well.  Tolerating p.o. diet, soft diet.  Stoma with output.  Ambulating, voiding spontaneously.  Good pain control.    Objective      Visit Vitals  BP 92/60 (BP Location: Right arm, Patient Position: Lying)   Pulse 65   Temp 97.9 °F (36.6 °C) (Axillary)   Resp 18   Ht 154.9 cm (60.98\")   Wt 58.3 kg (128 lb 8.5 oz)   SpO2 92%   BMI 24.30 kg/m²     Temp (24hrs), Av.8 °F (36.6 °C), Min:97.7 °F (36.5 °C), Max:98 °F (36.7 °C)      Nutrition: P.o.    Respiratory: RA      Physical Exam:     General Appearance:    Alert, cooperative, in no acute distress   Head:    Normocephalic, without obvious abnormality, atraumatic    Lungs:     Normal effort, symmetric chest rise, no crepitus, clear to      auscultation bilaterally             Heart:    Regular " rhythm and normal rate, normal S1 and S2   Abdomen:    Pink stoma with bridge., ostomy bag with brown liquid stool.  Incisions CDI.  HERACLIO with serosanguineous drainage.   Extremities:   No clubbing, cyanosis or edema.  No deformities.    Pulses:   Pulses palpable and equal bilaterally   Skin:   No bleeding, bruising or rash          Results Review:     I reviewed the patient's new clinical results.   Results from last 7 days   Lab Units 07/15/22  1551 07/14/22  0325 07/13/22  1648 07/12/22  0831   WBC 10*3/mm3 6.71 7.28  --  5.11   HEMOGLOBIN g/dL 11.1* 10.1* 10.6* 13.0   HEMATOCRIT % 34.7 32.2* 33.1* 40.2   PLATELETS 10*3/mm3 243 229  --  288     Results from last 7 days   Lab Units 07/15/22  1551 07/14/22  0325 07/12/22  0831   SODIUM mmol/L 134* 139 142   POTASSIUM mmol/L 3.9 4.0 4.0   CHLORIDE mmol/L 102 106 106   CO2 mmol/L 28.0 26.0 27.0   BUN mg/dL 7* 10 17   CREATININE mg/dL 0.55* 0.60 0.55*   CALCIUM mg/dL 9.0 8.8 8.7   BILIRUBIN mg/dL  --   --  0.3   ALK PHOS U/L  --   --  70   ALT (SGPT) U/L  --   --  8   AST (SGOT) U/L  --   --  12   GLUCOSE mg/dL 96 158* 110*     I reviewed the patient's new imaging including images and reports.    All medications reviewed.   enoxaparin, 40 mg, Subcutaneous, Q24H  levothyroxine, 88 mcg, Oral, Daily  saccharomyces boulardii, 250 mg, Oral, BID  sertraline, 25 mg, Oral, Nightly  vancomycin, 125 mg, Oral, Q6H        Assessment & Plan     S/P ROBOTIC LOW ANTERIOR RESECTION , COLOSTOMY    Rectal cancer (HCC)    Hypothyroid  Status post colostomy revision 7/15/2022      Plan  1. Ambulation, encouraged.  2. Pain control-prns       3. IS-encourage  4. DVT proph- Mechanical, subcutaneous Lovenox  5. Bowel regimen  6. Resume home medications per Dr. Henderson  7. Monitor post-op labs  8. DC planning.  Possible discharge home tomorrow.  9. Diet, p.o. diet as tolerated    WOC consult for stoma teaching/ following.      Hypothyroid  -Continue home Synthroid    Discussed with patient and with  RN.    Naresh Villela MD  07/17/22  09:21 EDT    Electronically signed by Naresh Villela MD at 07/17/22 0921     Senthil Alegre MD at 07/17/22 0882        Alert and oriented  Tolerating p.o.  Colostomy pink with bilious output.  Bridge still end.  Drainage serosanguineous from the HERACLIO  Will be here at least until tomorrow.    Electronically signed by Senthil Alegre MD at 07/17/22 0820     Senthil Alegre MD at 07/16/22 8243        Alert and oriented  Tolerating p.o.  Ileostomy pink.  Family still has not change the appliance so she may be here a day or 2.  Home at that time.    Electronically signed by Senthil Alegre MD at 07/16/22 4809

## 2022-07-18 NOTE — PROGRESS NOTES
Helena Cardoso     LOS: 5 days     Subjective   Ms. Cardoso is 5 days status post robotic low anterior resection with creation of colostomy and cystoscopy with bilateral ureteral catheters now removed.  She feels well and has been up and ambulating.  She still has some slight right lower quadrant tenderness which she feels is improving each day.  Pathology is still pending.  Patient tolerating diet.  150 mL of serous drainage out of HERACLIO.  Passing brown stool per stoma.  Urine output 550.  Last glucose 94.      Objective     Vital Signs  Vitals:    07/18/22 0357   BP: 97/81   Pulse: 61   Resp: 18   Temp: 97.8 °F (36.6 °C)   SpO2:        I/O  Intake & Output (last day)       07/17 0701  07/18 0700    Urine (mL/kg/hr) 550 (0.4)    Drains 115    Stool 25    Total Output 690    Net -690         Urine Unmeasured Occurrence 15 x          Physical Exam:    Abdomen soft with mild tenderness in the right lower quadrant..  Port sites healing well.       Results Review:       WBC WBC   Date Value Ref Range Status   07/15/2022 6.71 3.40 - 10.80 10*3/mm3 Final      HGB Hemoglobin   Date Value Ref Range Status   07/15/2022 11.1 (L) 12.0 - 15.9 g/dL Final      HCT Hematocrit   Date Value Ref Range Status   07/15/2022 34.7 34.0 - 46.6 % Final      PLT        Results from last 7 days   Lab Units 07/15/22  1551   PLATELETS 10*3/mm3 243            Sodium Sodium   Date Value Ref Range Status   07/15/2022 134 (L) 136 - 145 mmol/L Final      Potassium Potassium   Date Value Ref Range Status   07/15/2022 3.9 3.5 - 5.2 mmol/L Final      Chloride Chloride   Date Value Ref Range Status   07/15/2022 102 98 - 107 mmol/L Final      Bicarbonate No results found for: PLASMABICARB   BUN BUN   Date Value Ref Range Status   07/15/2022 7 (L) 8 - 23 mg/dL Final      Creatinine Creatinine   Date Value Ref Range Status   07/15/2022 0.55 (L) 0.57 - 1.00 mg/dL Final      Calcium Calcium   Date Value Ref Range Status   07/15/2022 9.0 8.6 - 10.5 mg/dL Final           Imaging Results (Last 24 Hours)     ** No results found for the last 24 hours. **          Assessment & Plan       S/P ROBOTIC LOW ANTERIOR RESECTION , COLOSTOMY    Rectal cancer (HCC)    Hypothyroid    Patient continues to do well.  Still has some right lower quadrant tenderness which she states is improving.  She is passing stool and tolerating diet.  Will recheck laboratories this morning.  Remove HERACLIO and stomal bridge.  If all looks well and she continues to improve consideration could be for discharge this afternoon or tomorrow.  I have asked her to follow-up with Dr. Henderosn in 2 weeks.  She is to call sooner for pain, fever, bleeding, nausea or vomiting.  She does not feel she needs a narcotic.  She may take Motrin for incisional pain.  Continue regular diet.  No lifting over 10 pounds.      Jay Easley MD  07/18/22  06:29 EDT

## 2022-07-18 NOTE — PLAN OF CARE
Problem: Adult Inpatient Plan of Care  Goal: Plan of Care Review  Outcome: Ongoing, Progressing  Goal: Patient-Specific Goal (Individualized)  Outcome: Ongoing, Progressing  Goal: Absence of Hospital-Acquired Illness or Injury  Outcome: Ongoing, Progressing  Intervention: Identify and Manage Fall Risk  Recent Flowsheet Documentation  Taken 7/18/2022 0000 by Uriel Camilo RN  Safety Promotion/Fall Prevention:   activity supervised   assistive device/personal items within reach   clutter free environment maintained   fall prevention program maintained   lighting adjusted   room organization consistent   safety round/check completed  Taken 7/17/2022 2200 by Uriel Camilo RN  Safety Promotion/Fall Prevention:   assistive device/personal items within reach   activity supervised   clutter free environment maintained   fall prevention program maintained   safety round/check completed   room organization consistent   lighting adjusted   nonskid shoes/slippers when out of bed  Taken 7/17/2022 2000 by Uriel Camilo RN  Safety Promotion/Fall Prevention:   activity supervised   assistive device/personal items within reach   clutter free environment maintained   fall prevention program maintained   lighting adjusted   nonskid shoes/slippers when out of bed   room organization consistent   safety round/check completed  Intervention: Prevent Skin Injury  Recent Flowsheet Documentation  Taken 7/18/2022 0000 by Uriel Camilo RN  Body Position: position changed independently  Taken 7/17/2022 2200 by Uriel Camilo RN  Body Position: position changed independently  Taken 7/17/2022 2000 by Uriel Camilo RN  Body Position: position changed independently  Skin Protection:   adhesive use limited   incontinence pads utilized   transparent dressing maintained   tubing/devices free from skin contact  Intervention: Prevent and Manage VTE (Venous Thromboembolism) Risk  Recent Flowsheet Documentation  Taken 7/18/2022 0000 by  Camilo, Uriel, RN  Activity Management: activity adjusted per tolerance  Taken 7/17/2022 2200 by Uriel Camilo RN  Activity Management: activity adjusted per tolerance  Taken 7/17/2022 2000 by Uriel Camilo RN  Activity Management: activity adjusted per tolerance  Intervention: Prevent Infection  Recent Flowsheet Documentation  Taken 7/18/2022 0000 by Uriel Camilo RN  Infection Prevention: environmental surveillance performed  Goal: Optimal Comfort and Wellbeing  Outcome: Ongoing, Progressing  Intervention: Provide Person-Centered Care  Recent Flowsheet Documentation  Taken 7/17/2022 2000 by Uriel Camilo RN  Trust Relationship/Rapport: care explained  Goal: Readiness for Transition of Care  Outcome: Ongoing, Progressing     Problem: Pain Acute  Goal: Acceptable Pain Control and Functional Ability  Outcome: Ongoing, Progressing  Intervention: Prevent or Manage Pain  Recent Flowsheet Documentation  Taken 7/17/2022 2000 by Uriel Camilo RN  Medication Review/Management: medications reviewed  Intervention: Optimize Psychosocial Wellbeing  Recent Flowsheet Documentation  Taken 7/17/2022 2000 by Uriel Camilo RN  Diversional Activities: television     Problem: Skin Injury Risk Increased  Goal: Skin Health and Integrity  Outcome: Ongoing, Progressing  Intervention: Optimize Skin Protection  Recent Flowsheet Documentation  Taken 7/18/2022 0000 by Uriel Camilo RN  Head of Bed (HOB) Positioning: HOB elevated  Taken 7/17/2022 2200 by Uriel Camilo RN  Head of Bed (HOB) Positioning: HOB elevated  Taken 7/17/2022 2000 by Uriel Camilo RN  Pressure Reduction Techniques: frequent weight shift encouraged  Head of Bed (HOB) Positioning: HOB elevated  Pressure Reduction Devices: pressure-redistributing mattress utilized  Skin Protection:   adhesive use limited   incontinence pads utilized   transparent dressing maintained   tubing/devices free from skin contact     Problem: Fall Injury Risk  Goal: Absence of  Fall and Fall-Related Injury  Outcome: Ongoing, Progressing  Intervention: Identify and Manage Contributors  Recent Flowsheet Documentation  Taken 7/17/2022 2000 by Uriel Camilo, RN  Medication Review/Management: medications reviewed  Intervention: Promote Injury-Free Environment  Recent Flowsheet Documentation  Taken 7/18/2022 0000 by Uriel Camilo, RN  Safety Promotion/Fall Prevention:   activity supervised   assistive device/personal items within reach   clutter free environment maintained   fall prevention program maintained   lighting adjusted   room organization consistent   safety round/check completed  Taken 7/17/2022 2200 by Uriel Camilo RN  Safety Promotion/Fall Prevention:   assistive device/personal items within reach   activity supervised   clutter free environment maintained   fall prevention program maintained   safety round/check completed   room organization consistent   lighting adjusted   nonskid shoes/slippers when out of bed  Taken 7/17/2022 2000 by Uriel Camilo RN  Safety Promotion/Fall Prevention:   activity supervised   assistive device/personal items within reach   clutter free environment maintained   fall prevention program maintained   lighting adjusted   nonskid shoes/slippers when out of bed   room organization consistent   safety round/check completed   Goal Outcome Evaluation:

## 2022-07-18 NOTE — PROGRESS NOTES
Return P/C received from Dr Pelaez's office and he will sign home health orders at discharge-Beto BARAJAS([facility] Hospital Liaison

## 2022-07-18 NOTE — NURSING NOTE
Woc follow up for ostomy education    Surgery date: 07/13/2022, revision done on 07/15/2022    Ostomy type: Colostomy    Appliance in place: Oralia 8331    Last pouch change: 07/18/2022    Stoma Assessment: Stoma is round except for at the 11:00 aspect where was pulled over and sutured to the skin there was a katarina in place which was removed.  Dissolvable sutures.  The stool is functioning very well it is red edematous and protruding above skin level.  Peristomal skin is slightly red.  Woc had to cut pouch and 51 wide by 38 tall this is a little big but had to include this flap.  Drains removed without incident.    Education performed: Pouch changing reviewed, patient is already taking home lots of discharge supplies tomorrow were given.  Patient is taking home resource folder and other things.  Everything in place.  Okay to go home from Woc standpoint.    Woc will continue to follow.     Please contact with questions or concerns.

## 2022-07-19 ENCOUNTER — HOME HEALTH ADMISSION (OUTPATIENT)
Dept: HOME HEALTH SERVICES | Facility: HOME HEALTHCARE | Age: 77
End: 2022-07-19

## 2022-07-19 ENCOUNTER — READMISSION MANAGEMENT (OUTPATIENT)
Dept: CALL CENTER | Facility: HOSPITAL | Age: 77
End: 2022-07-19

## 2022-07-19 VITALS
BODY MASS INDEX: 24.27 KG/M2 | HEART RATE: 69 BPM | RESPIRATION RATE: 18 BRPM | WEIGHT: 128.53 LBS | TEMPERATURE: 97.7 F | HEIGHT: 61 IN | DIASTOLIC BLOOD PRESSURE: 59 MMHG | SYSTOLIC BLOOD PRESSURE: 95 MMHG | OXYGEN SATURATION: 94 %

## 2022-07-19 LAB
CYTO UR: NORMAL
LAB AP CASE REPORT: NORMAL
LAB AP CLINICAL INFORMATION: NORMAL
PATH REPORT.FINAL DX SPEC: NORMAL
PATH REPORT.GROSS SPEC: NORMAL

## 2022-07-19 PROCEDURE — 25010000002 ENOXAPARIN PER 10 MG: Performed by: COLON & RECTAL SURGERY

## 2022-07-19 RX ORDER — TRAMADOL HYDROCHLORIDE 50 MG/1
50 TABLET ORAL EVERY 6 HOURS PRN
Qty: 4 TABLET | Refills: 0 | Status: SHIPPED | OUTPATIENT
Start: 2022-07-19

## 2022-07-19 RX ADMIN — VANCOMYCIN HYDROCHLORIDE 125 MG: 125 CAPSULE ORAL at 05:42

## 2022-07-19 RX ADMIN — Medication 250 MG: at 09:19

## 2022-07-19 RX ADMIN — VANCOMYCIN HYDROCHLORIDE 125 MG: 125 CAPSULE ORAL at 02:31

## 2022-07-19 RX ADMIN — ENOXAPARIN SODIUM 40 MG: 40 INJECTION SUBCUTANEOUS at 09:19

## 2022-07-19 NOTE — PLAN OF CARE
Goal Outcome Evaluation:  Plan of Care Reviewed With: patient           Outcome Evaluation: Pt A/O x 4 complains of pain rating 7/10 on pain scale, Tramadol given for pain and effective per patient. Standby assist and uses cane to ambulate. VSS at this time, will continue to monitor. Plan to get discharged home in the AM.

## 2022-07-19 NOTE — DISCHARGE SUMMARY
Patient Name: Helena Cardoso  MRN: [id number]  : 1945  DOS: 2022    Attending: Kristi Henderson MD    Primary Care Provider: Jay Pelaez DO    Date of Admission:.2022 10:42 AM    Date of Discharge:  2022    Discharge Diagnosis:   S/P ROBOTIC LOW ANTERIOR RESECTION , COLOSTOMY    Rectal cancer (HCC)    Hypothyroid      Hospital Course  At admit    Patient is a pleasant 76 y.o. female presented for scheduled surgery by Dr. Henderson. She anticipates low anterior resection, colostomy and urethral catheters today.  She states she has had a 7-month history of changes in bowel habits.  She reports having been treated for C. difficile toxin on 2 occasions.  She reports occasional blood or mucus in her stool.  She reports a 30 pound weight loss over the last 7 months.  She was found to have rectal cancer.  She has seen by both oncology and radiation oncology.     When seen preop she is doing well.  She denies pain or other complaints.  She denies nausea, shortness of breath or chest pain.  No history of DVT or PE.    (Patient subsequently underwent robotic low anterior resection, colostomy, by Dr. Henderson as well as cystoscopy and bilateral ureteral catheters by urology, was admitted for further management.)wy    After admit    Patient was provided pain medication as needed for pain control.  Has for the most part avoided opiates, took occasional tramadol.    She received DVT prophylaxis with subcutaneous Lovenox as well as mechanicals      Patient was started on clear liquid diet, this was advanced when she showed evidence of bowel function return.  She had good bowel function however she was noted to have ischemic changes of her stoma, it was felt best that she has a revision which was done by Dr. Henderson under general anesthesia on 7/15/2022, was tolerated well.    Diet was resumed and was advanced afterwards and patient tolerated diet without difficulty.    She used an IS for atelectasis  "prophylaxis.    Home medications were resumed as appropriate, and labs were monitored and remained fairly stable.      He was seen by WOCN, received extensive education and instructions during his stay. All pt questions were addressed;  education folder, stomal care, fluids, diet, activity, work, lifting restrictions for 6-8 weeks were reviewed.      With the progress she has made, pt is ready for DC home today.      Discussed with patient regarding plan and she shows understanding and agreement.       Procedures Performed  Helena RAMONITA Cardoso  2022     Pre-op Diagnosis:   Locally advanced rectal cancer        Post-op Diagnosis:    Locally advanced rectal cancer     Procedure(s):  ROBOTIC LOW ANTERIOR RESECTION   COLOSTOMY   CYSTOSCOPY WITH BILATERAL URETHERAL CATHETERS        Surgeon(s):  Kristi Henderson MD Stark, Timothy, MD     Anesthesia: General with Block      7/15/2022  Procedure(s):  COLOSTOMY REVISION       Pertinent Test Results:    I reviewed the patient's new clinical results.   Results from last 7 days   Lab Units 07/15/22  1551 22  0325 22  1648   WBC 10*3/mm3 6.71 7.28  --    HEMOGLOBIN g/dL 11.1* 10.1* 10.6*   HEMATOCRIT % 34.7 32.2* 33.1*   PLATELETS 10*3/mm3 243 229  --      Results from last 7 days   Lab Units 07/15/22  1551 22  0325   SODIUM mmol/L 134* 139   POTASSIUM mmol/L 3.9 4.0   CHLORIDE mmol/L 102 106   CO2 mmol/L 28.0 26.0   BUN mg/dL 7* 10   CREATININE mg/dL 0.55* 0.60   CALCIUM mg/dL 9.0 8.8   GLUCOSE mg/dL 96 158*     I reviewed the patient's new imaging including images and reports.      Discharge Assessment:       Visit Vitals  BP 95/59 (BP Location: Right arm, Patient Position: Lying)   Pulse 69   Temp 97.7 °F (36.5 °C) (Oral)   Resp 18   Ht 154.9 cm (60.98\")   Wt 58.3 kg (128 lb 8.5 oz)   SpO2 94%   BMI 24.30 kg/m²     Temp (24hrs), Av.1 °F (36.7 °C), Min:97.7 °F (36.5 °C), Max:98.6 °F (37 °C)      General Appearance:    Alert, cooperative, in no acute " distress   Lungs:     Clear to auscultation,respirations regular, even and                   unlabored    Heart:    Regular rhythm and normal rate, normal S1 and S2    Abdomen:   Soft and benign with clean incisions. Pink stoma with output.   Extremities:   Moves all extremities well, no edema, no cyanosis, no              redness   Pulses:   Pulses palpable and equal bilaterally   Skin:   No bleeding, bruising or rash            Discharge Disposition:          Discharge Medications      New Medications      Instructions Start Date   acetaminophen 500 MG tablet  Commonly known as: TYLENOL   1,000 mg, Oral, Every 6 Hours PRN      traMADol 50 MG tablet  Commonly known as: ULTRAM   50 mg, Oral, Every 6 Hours PRN         Continue These Medications      Instructions Start Date   CBD oral oil  Commonly known as: cannabidiol   1 drop, Oral, Daily      coenzyme Q10 100 MG capsule   100 mg, Oral, Daily      fidaxomicin 200 MG tablet  Commonly known as: DIFICID   200 mg, Oral, 2 Times Daily      levothyroxine 88 MCG tablet  Commonly known as: SYNTHROID, LEVOTHROID   88 mcg, Oral, Daily      magnesium chloride ER 64 MG DR tablet   128 mg, Oral, Nightly      PROBIOTIC ADVANCED PO   1 capsule, Oral, Daily      sertraline 25 MG tablet  Commonly known as: ZOLOFT   25 mg, Oral, Daily      Turmeric 500 MG capsule   1,500 mg, Oral, Daily      Zinc 50 MG tablet   50 mg, Oral, Daily             Discharge Diet:  Regular.    Activity at Discharge: Restrictions per colorectal surgery, stoma team    Follow-up Appointments  Kristi Henderson MD per her orders.    Discharge took over 30 min    Dragon disclaimer:  Part of this encounter note is an electronic transcription/translation of spoken language to printed text. The electronic translation of spoken language may permit erroneous, or at times, nonsensical words or phrases to be inadvertently transcribed; Although I have reviewed the note for such errors, some may still exist.       Naresh  MD Manohar  07/19/22  12:29 EDT

## 2022-07-19 NOTE — NURSING NOTE
Woc follow up for ostomy education    Surgery date: 07/13/2023 with revision on 07/15/2020    Ostomy type: End colostomy    Appliance in place: Oralia 8331    Last pouch change: 07/18/2022    Stoma Assessment: Stoma is red irregular in shape swollen protruding above skin level and functioning well.  Pouch is completely intact.    Education performed: Discussed pouching options for when stool turns too solid to drain out of the bottom.  Discussed home care discussed ordering supplies.  DC supplies given.    Woc will continue to follow.     Please contact with questions or concerns.

## 2022-07-20 ENCOUNTER — HOME CARE VISIT (OUTPATIENT)
Dept: HOME HEALTH SERVICES | Facility: HOME HEALTHCARE | Age: 77
End: 2022-07-20

## 2022-07-20 VITALS
HEART RATE: 72 BPM | DIASTOLIC BLOOD PRESSURE: 62 MMHG | TEMPERATURE: 97.2 F | SYSTOLIC BLOOD PRESSURE: 110 MMHG | OXYGEN SATURATION: 96 % | RESPIRATION RATE: 16 BRPM

## 2022-07-20 PROCEDURE — G0299 HHS/HOSPICE OF RN EA 15 MIN: HCPCS

## 2022-07-20 NOTE — OUTREACH NOTE
Prep Survey    Flowsheet Row Responses   [facility] facility patient discharged from? [facility]   Is LACE score < 7 ? No   Emergency Room discharge w/ pulse ox? No   Eligibility Readm Mgmt   Discharge diagnosis COLOSTOMY REVISION   Does the patient have one of the following disease processes/diagnoses(primary or secondary)? General Surgery   Does the patient have Home health ordered? Yes   What is the Home health agency?  [facility]   Is there a DME ordered? No   Prep survey completed? Yes          GIL ESCOTO - Registered Nurse

## 2022-07-21 ENCOUNTER — READMISSION MANAGEMENT (OUTPATIENT)
Dept: CALL CENTER | Facility: HOSPITAL | Age: 77
End: 2022-07-21

## 2022-07-21 NOTE — HOME HEALTH
SOC Note:    Home Health ordered for: disciplines sn, pt    Reason for Hosp/Primary Dx/Co-morbidities: Pt is a 76yr old  female. Daughters assist with care. Pt had colostomy placed on 7.13.22 and had issues with stoma. Subsequent revision on 7.15.22. d/c from [facility] on 7.19.22. Pt is currently POSITIVE FOR CDIFF and is on abx regimen of Dificid. Daughter, Esperanza, will be assisting with colostomy changes. Instructed with office number if any assistance needed. Pt and caregiver deny any issues or concerns, other pmh: rectal cancer, hypothyroidism, Bypass x2 in 1992.     Focus of Care:colostomy care teaching and management, medication management, supplies arranged at d/c    Current Functional status/mobility/DME: cane    HB status/Living Arrangements: Lives alone with assistance from family.     Skin Integrity/wound status: New colostomy to LLQ.     Code Status: CPR    Fall Risk: YES    POC confirmed with Dr Pelaez via in basket on 7.20.22

## 2022-07-21 NOTE — OUTREACH NOTE
General Surgery Week 1 Survey    Flowsheet Row Responses   [facility] patient discharged from? [facility]   Does the patient have one of the following disease processes/diagnoses(primary or secondary)? General Surgery   Week 1 attempt successful? Yes   Call start time 1604   Call end time 1606   Discharge diagnosis COLOSTOMY REVISION   Meds reviewed with patient/caregiver? Yes   Is the patient having any side effects they believe may be caused by any medication additions or changes? No   Does the patient have all medications related to this admission filled (includes all antibiotics, pain medications, etc.) Yes   Is the patient taking all medications as directed (includes completed medication regime)? Yes   Does the patient have a follow up appointment scheduled with their surgeon? Yes  [8/3/22]   Has the patient kept scheduled appointments due by today? N/A   What is the Home health agency?  [facility]   Has home health visited the patient within 72 hours of discharge? Yes   Psychosocial issues? No   Did the patient receive a copy of their discharge instructions? Yes   Nursing interventions Reviewed instructions with patient   What is the patient's perception of their health status since discharge? Improving   Is the patient /caregiver able to teach back basic post-op care? Keep incision areas clean,dry and protected   Is the patient/caregiver able to teach back signs and symptoms of incisional infection? Fever   Is the patient/caregiver able to teach back steps to recovery at home? Set small, achievable goals for return to baseline health, Rest and rebuild strength, gradually increase activity   If the patient is a current smoker, are they able to teach back resources for cessation? Not a smoker   Is the patient/caregiver able to teach back the hierarchy of who to call/visit for symptoms/problems? PCP, Specialist, Home health nurse, Urgent Care, ED, 911 Yes   Week 1 call completed? Yes   Revoked No  "further contact(revokes)-requires comment   Is the patient interested in additional calls from an ambulatory ?  NOTE:  applies to high risk patients requiring additional follow-up. No   Graduated/Revoked comments Pt states, \"I don't believe it's necessary\" when asked about another call          LILY RIVAS - Registered Nurse  "

## 2022-07-22 ENCOUNTER — HOME CARE VISIT (OUTPATIENT)
Dept: HOME HEALTH SERVICES | Facility: HOME HEALTHCARE | Age: 77
End: 2022-07-22

## 2022-07-22 PROCEDURE — G0151 HHCP-SERV OF PT,EA 15 MIN: HCPCS

## 2022-07-22 NOTE — PAYOR COMM NOTE
"Helena Amaro (76 y.o. Female)             Date of Birth   1945    Social Security Number       Address   [address] [address]    Home Phone   [phone]    MRN   [id number]       Caodaism   Alevism    Marital Status                               Admission Date   22    Admission Type   Elective    Admitting Provider   Kristi Henderson MD    Attending Provider       Department, Room/Bed   [address], S[address]/1       Discharge Date   2022    Discharge Disposition   Home or Self Care    Discharge Destination                               Attending Provider: (none)   Allergies: Tetracycline    Isolation: None   Infection: C.difficile (22)   Code Status: CPR   Advance Care Planning Activity    Ht: 154.9 cm (60.98\")   Wt: 58.3 kg (128 lb 8.5 oz)    Admission Cmt: None   Principal Problem: S/P ROBOTIC LOW ANTERIOR RESECTION , COLOSTOMY [Z90.49]                 Active Insurance as of 2022     Primary Coverage     Payor Plan Insurance Group Employer/Plan Group    [facility] MEDICARE REPLACEMENT [employer] MEDICARE REPLACEMENT      Payor Plan Address Payor Plan Phone Number Payor Plan Fax Number Effective Dates    [address] [address] [phone]  2022 - None Entered    [facility] [id number]       Subscriber Name Subscriber Birth Date Member ID       HELENA AMARO 1945 [id number]                 Emergency Contacts      (Rel.) Home Phone Work Phone Mobile Phone    PHYLLIS AMARO (Daughter) -- -- [phone]    THONYDONTAE (Daughter) -- -- [phone]               Discharge Summary      Naresh Villela MD at 22 1228            Patient Name: Helena Amaro  MRN: [id number]  : 1945  DOS: 2022    Attending: Kristi Henderson MD    Primary Care Provider: Jay Pelaez DO    Date of Admission:.2022 10:42 AM    Date of Discharge:  2022    Discharge Diagnosis:   S/P ROBOTIC LOW " ANTERIOR RESECTION , COLOSTOMY    Rectal cancer (HCC)    Hypothyroid      Hospital Course  At admit    Patient is a pleasant 76 y.o. female presented for scheduled surgery by Dr. Henderson. She anticipates low anterior resection, colostomy and urethral catheters today.  She states she has had a 7-month history of changes in bowel habits.  She reports having been treated for C. difficile toxin on 2 occasions.  She reports occasional blood or mucus in her stool.  She reports a 30 pound weight loss over the last 7 months.  She was found to have rectal cancer.  She has seen by both oncology and radiation oncology.     When seen preop she is doing well.  She denies pain or other complaints.  She denies nausea, shortness of breath or chest pain.  No history of DVT or PE.    (Patient subsequently underwent robotic low anterior resection, colostomy, by Dr. Henderson as well as cystoscopy and bilateral ureteral catheters by urology, was admitted for further management.)wy    After admit    Patient was provided pain medication as needed for pain control.  Has for the most part avoided opiates, took occasional tramadol.    She received DVT prophylaxis with subcutaneous Lovenox as well as mechanicals      Patient was started on clear liquid diet, this was advanced when she showed evidence of bowel function return.  She had good bowel function however she was noted to have ischemic changes of her stoma, it was felt best that she has a revision which was done by Dr. Henderson under general anesthesia on 7/15/2022, was tolerated well.    Diet was resumed and was advanced afterwards and patient tolerated diet without difficulty.    She used an IS for atelectasis prophylaxis.    Home medications were resumed as appropriate, and labs were monitored and remained fairly stable.      He was seen by WOCN, received extensive education and instructions during his stay. All pt questions were addressed;  education folder, stomal care, fluids, diet, activity,  "work, lifting restrictions for 6-8 weeks were reviewed.      With the progress she has made, pt is ready for DC home today.      Discussed with patient regarding plan and she shows understanding and agreement.       Procedures Performed  Helena Cardoso  2022     Pre-op Diagnosis:   Locally advanced rectal cancer        Post-op Diagnosis:    Locally advanced rectal cancer     Procedure(s):  ROBOTIC LOW ANTERIOR RESECTION   COLOSTOMY   CYSTOSCOPY WITH BILATERAL URETHERAL CATHETERS        Surgeon(s):  Kristi Henderson MD Stark, Timothy, MD     Anesthesia: General with Block      7/15/2022  Procedure(s):  COLOSTOMY REVISION       Pertinent Test Results:    I reviewed the patient's new clinical results.   Results from last 7 days   Lab Units 07/15/22  1551 22  0325 22  1648   WBC 10*3/mm3 6.71 7.28  --    HEMOGLOBIN g/dL 11.1* 10.1* 10.6*   HEMATOCRIT % 34.7 32.2* 33.1*   PLATELETS 10*3/mm3 243 229  --      Results from last 7 days   Lab Units 07/15/22  1551 22  0325   SODIUM mmol/L 134* 139   POTASSIUM mmol/L 3.9 4.0   CHLORIDE mmol/L 102 106   CO2 mmol/L 28.0 26.0   BUN mg/dL 7* 10   CREATININE mg/dL 0.55* 0.60   CALCIUM mg/dL 9.0 8.8   GLUCOSE mg/dL 96 158*     I reviewed the patient's new imaging including images and reports.      Discharge Assessment:       Visit Vitals  BP 95/59 (BP Location: Right arm, Patient Position: Lying)   Pulse 69   Temp 97.7 °F (36.5 °C) (Oral)   Resp 18   Ht 154.9 cm (60.98\")   Wt 58.3 kg (128 lb 8.5 oz)   SpO2 94%   BMI 24.30 kg/m²     Temp (24hrs), Av.1 °F (36.7 °C), Min:97.7 °F (36.5 °C), Max:98.6 °F (37 °C)      General Appearance:    Alert, cooperative, in no acute distress   Lungs:     Clear to auscultation,respirations regular, even and                   unlabored    Heart:    Regular rhythm and normal rate, normal S1 and S2    Abdomen:   Soft and benign with clean incisions. Pink stoma with output.   Extremities:   Moves all extremities well, no edema, " no cyanosis, no              redness   Pulses:   Pulses palpable and equal bilaterally   Skin:   No bleeding, bruising or rash            Discharge Disposition:          Discharge Medications      New Medications      Instructions Start Date   acetaminophen 500 MG tablet  Commonly known as: TYLENOL   1,000 mg, Oral, Every 6 Hours PRN      traMADol 50 MG tablet  Commonly known as: ULTRAM   50 mg, Oral, Every 6 Hours PRN         Continue These Medications      Instructions Start Date   CBD oral oil  Commonly known as: cannabidiol   1 drop, Oral, Daily      coenzyme Q10 100 MG capsule   100 mg, Oral, Daily      fidaxomicin 200 MG tablet  Commonly known as: DIFICID   200 mg, Oral, 2 Times Daily      levothyroxine 88 MCG tablet  Commonly known as: SYNTHROID, LEVOTHROID   88 mcg, Oral, Daily      magnesium chloride ER 64 MG DR tablet   128 mg, Oral, Nightly      PROBIOTIC ADVANCED PO   1 capsule, Oral, Daily      sertraline 25 MG tablet  Commonly known as: ZOLOFT   25 mg, Oral, Daily      Turmeric 500 MG capsule   1,500 mg, Oral, Daily      Zinc 50 MG tablet   50 mg, Oral, Daily             Discharge Diet:  Regular.    Activity at Discharge: Restrictions per colorectal surgery, stoma team    Follow-up Appointments  Kristi Henderson MD per her orders.    Discharge took over 30 min    Dragon disclaimer:  Part of this encounter note is an electronic transcription/translation of spoken language to printed text. The electronic translation of spoken language may permit erroneous, or at times, nonsensical words or phrases to be inadvertently transcribed; Although I have reviewed the note for such errors, some may still exist.       Naresh Villela MD  07/19/22  12:29 EDT          Electronically signed by Naresh Villela MD at 07/22/22 [id number]

## 2022-07-24 VITALS
TEMPERATURE: 97.7 F | DIASTOLIC BLOOD PRESSURE: 84 MMHG | HEART RATE: 73 BPM | RESPIRATION RATE: 18 BRPM | SYSTOLIC BLOOD PRESSURE: 126 MMHG | OXYGEN SATURATION: 95 %

## 2022-07-24 NOTE — HOME HEALTH
"Patient declines need for further PT intervention at this time.  Patient declines need for any HEP and states \"I get plenty of exercise around here\".  Patient amb without AD normally but does utilize a st cane prn \"when I go out of the house\".  TUG = 5 repetitions.   23 Timed Up and Go."

## 2022-07-24 NOTE — CASE COMMUNICATION
Patient seen for initial evaluation only this date.  Patient declines need for further PT intervention, has family assistance 24/7, and verbalizes understanding of importance of hourly ambulation and daily ther ex performance.

## 2022-07-25 ENCOUNTER — HOME CARE VISIT (OUTPATIENT)
Dept: HOME HEALTH SERVICES | Facility: HOME HEALTHCARE | Age: 77
End: 2022-07-25

## 2022-07-25 VITALS
RESPIRATION RATE: 16 BRPM | TEMPERATURE: 97.8 F | SYSTOLIC BLOOD PRESSURE: 105 MMHG | HEART RATE: 61 BPM | OXYGEN SATURATION: 96 % | DIASTOLIC BLOOD PRESSURE: 60 MMHG

## 2022-07-25 PROCEDURE — G0299 HHS/HOSPICE OF RN EA 15 MIN: HCPCS

## 2022-07-25 NOTE — HOME HEALTH
SN vsiit to review and educate new ostomy, supplies, and medications. RN cleansed and applied new appliance, education provided to patient and her daughter about the use of barrier spray, and stoma powder. Medications reviewed. RN also inventoried supplies. Stoma was cleansed with warm soapy water. Stool present in exsisting bag.

## 2022-08-01 ENCOUNTER — HOME CARE VISIT (OUTPATIENT)
Dept: HOME HEALTH SERVICES | Facility: HOME HEALTHCARE | Age: 77
End: 2022-08-01

## 2022-08-01 VITALS
RESPIRATION RATE: 16 BRPM | TEMPERATURE: 98.3 F | DIASTOLIC BLOOD PRESSURE: 62 MMHG | SYSTOLIC BLOOD PRESSURE: 116 MMHG | OXYGEN SATURATION: 97 % | HEART RATE: 65 BPM

## 2022-08-01 PROCEDURE — G0299 HHS/HOSPICE OF RN EA 15 MIN: HCPCS

## 2022-08-01 NOTE — HOME HEALTH
SNV for colostomy assessment/education.  Patient is doing well with it.  Will more than likely be ready for discharge next week.

## 2022-08-04 ENCOUNTER — HOSPITAL ENCOUNTER (OUTPATIENT)
Dept: WOUND CARE | Facility: HOSPITAL | Age: 77
Discharge: HOME | End: 2022-08-04

## 2022-08-08 ENCOUNTER — HOME CARE VISIT (OUTPATIENT)
Dept: HOME HEALTH SERVICES | Facility: HOME HEALTHCARE | Age: 77
End: 2022-08-08

## 2022-08-08 VITALS
TEMPERATURE: 97 F | DIASTOLIC BLOOD PRESSURE: 60 MMHG | RESPIRATION RATE: 16 BRPM | SYSTOLIC BLOOD PRESSURE: 110 MMHG | OXYGEN SATURATION: 96 % | HEART RATE: 70 BPM

## 2022-08-08 PROCEDURE — G0495 RN CARE TRAIN/EDU IN HH: HCPCS

## 2022-08-08 NOTE — CASE COMMUNICATION
Patient discharged from  services to care of self in her home effective 8.8.22 d/t goals met, teaching complete, and patient independent with ostomy care. No further SN needs.  PT eval only.

## 2022-10-31 ENCOUNTER — TRANSCRIBE ORDERS (OUTPATIENT)
Dept: ADMINISTRATIVE | Facility: HOSPITAL | Age: 77
End: 2022-10-31

## 2022-10-31 DIAGNOSIS — C20 RECTAL CANCER: Primary | ICD-10-CM

## 2022-11-17 ENCOUNTER — HOSPITAL ENCOUNTER (OUTPATIENT)
Dept: WOUND CARE | Facility: HOSPITAL | Age: 77
Discharge: HOME | End: 2022-11-17
Admitting: COLON & RECTAL SURGERY

## 2022-11-17 PROCEDURE — G0463 HOSPITAL OUTPT CLINIC VISIT: HCPCS

## 2022-11-18 NOTE — NURSING NOTE
Patient presents with colostomy.  Patient states she is not really having much problems with leaking so much as the except at night she will wake up and some stool has pushed up under the wafer.  It does not happen all the time.  Patient is currently wearing a Coloplast 1 piece closed-end pouch and the catalog was given with this product marked.  Patient was wearing a [employer] 2 piece has a bunch of the wafers but no longer has the pouches that were made for the wafers.  [facility] needs to investigate this further we will probably call patient and have her bring the supplies to [facility] so we can look at what we need to order.  Is this patient likes this product.    Patient presents with a small hernia parastomal hernia.  I [facility] measured and ordered a prolapse or a rather hernia support belt from [place].  The product reads as a flat panel cool comfort large, 6 inch wide base with 3 and 1 8 Sensura ring.  The product code is BG-6/7/2002-Q and a reference number of [id number].    This item was called into [employer] and ordered through [employer].  It came to note that the patient has 2 accounts with [employer] and only one is active so to limit confusion about which one would be ordered patient was called back and given the active account number the active account number is as follows: [id number].  It must also be noted that the account name is under Karolina Cardoso not Helena Cardoso.

## 2023-04-20 ENCOUNTER — TELEPHONE (OUTPATIENT)
Dept: WOUND CARE | Facility: HOSPITAL | Age: 78
End: 2023-04-20
Payer: MEDICARE

## 2023-04-20 NOTE — TELEPHONE ENCOUNTER
Helena Cardoso  1945  [id number]    Summary: Patient reached out to Woc stating that she is still having slight problems with her pouch.  She states that her stool is hanging out on the stoma instead of falling off into the bag.  This is a problem where at night it may cause a mass in the morning if there is so much Dulcolax that it pushes the bag off because it does not fall down into the bag.  Patient also states that sometimes she notices that right before she has a bowel movement that her stoma swells up really big and gets wider and a little bit longer and then she will have a bowel movement and then it goes down.  Patient is wearing a hernia belt from [place] and has stated that she has gained slight weight since the last time of talk to her but not significant.      Woc suggested slightly loosening the hernia belt during the day to relieve the pressure as this might the pressure against her hernia and then the stool could cause the swelling to see if this helps with that.  While still supporting the hernia.  Patient is asymptomatic when this happens.      She did have it happen in the middle the day where she could not get her pouch on she is cutting at the second ring of the flat Coloplast Sensura karla 1 piece closed-end's.  She said she had to cut it at the third ring and push the stoma through because it was so wide.  And then right after that she had a bowel movement and it shrunk back down.    Due to having a colostomy there are not as many enzymes for skin breakdown so Woc suggested but due to this problem cutting at the third ring from now on and using it that way as long as the pouch is still sticking.  Because cutting it 1 ring higher is only about 1/8 of an inch or slightly to not even a quarter of an inch larger so not enough skin would be exposed to do any real damage.  Also suggested using him 9 or other deodorizer lubricant putting a few drops in the pouch and then wrinkling the bag to  spread it around.  Woc did tell patient that some people say this really works and some people say it does not she will call back to report if it does or not.    The other suggestion is placing a new bag on at night before she goes to bed in the hopes that this will help with her not having to wake up to a mass.  Her insurance will cover enough of those pouches to be able to change 3 times a day her current changing rate is twice a day at this time.  And that reassured patient that that is okay and normal.    Patient will call back if these interventions do not work.      Time spent teleconferencing with patient was: 30 minutes

## 2023-08-14 ENCOUNTER — OFFICE VISIT (OUTPATIENT)
Dept: GASTROENTEROLOGY | Facility: CLINIC | Age: 78
End: 2023-08-14
Payer: MEDICARE

## 2023-08-14 VITALS
DIASTOLIC BLOOD PRESSURE: 80 MMHG | TEMPERATURE: 97.3 F | HEIGHT: 61 IN | WEIGHT: 142.4 LBS | HEART RATE: 64 BPM | BODY MASS INDEX: 26.88 KG/M2 | SYSTOLIC BLOOD PRESSURE: 119 MMHG

## 2023-08-14 DIAGNOSIS — K94.09 HERNIA DUE TO COLOSTOMY: Primary | ICD-10-CM

## 2023-08-14 DIAGNOSIS — K46.9 HERNIA DUE TO COLOSTOMY: Primary | ICD-10-CM

## 2023-08-14 PROCEDURE — 1159F MED LIST DOCD IN RCRD: CPT | Performed by: NURSE PRACTITIONER

## 2023-08-14 PROCEDURE — 99203 OFFICE O/P NEW LOW 30 MIN: CPT | Performed by: NURSE PRACTITIONER

## 2023-08-14 PROCEDURE — 1160F RVW MEDS BY RX/DR IN RCRD: CPT | Performed by: NURSE PRACTITIONER

## 2023-08-14 NOTE — PROGRESS NOTES
GASTROENTEROLOGY OFFICE NOTE  Helena Cardoso  [id number]  1945    CARE TEAM  Patient Care Team:  Jay Pelaez DO as PCP - General (Family Medicine)  Kristi Henderson MD as Referring Physician (Colon and Rectal Surgery)  Ish So MD as Consulting Physician (Radiation Oncology)    Referring Provider: Charbel Jovel MD    Chief Complaint   Patient presents with    GI Bleeding        HISTORY OF PRESENT ILLNESS:  Helena Cardoso is a 78-year-old female with a history of stage II rectal cancer status post low anterior resection with colostomy on 7/13/2022 followed by Dr. Kristi Henderson.  She was referred to gastroenterology with concerns of a hernia at the colostomy.  She would like to pursue a hernia repair.  She reports that Dr. Henderson indicated that this hernia could be repaired however the patient would like to work with another surgeon.    PAST MEDICAL HISTORY  Past Medical History:   Diagnosis Date    Clostridium difficile infection 07/2022    Colon polyps     Coronary artery disease     Depression     Disease of thyroid gland     Elevated cholesterol     Hiatal hernia     Rectal cancer         PAST SURGICAL HISTORY  Past Surgical History:   Procedure Laterality Date    CARDIAC CATHETERIZATION  1992    COLON RESECTION N/A 7/13/2022    Procedure: ROBOTIC LOW ANTERIOR RESECTION , COLOSTOMY;  Surgeon: Kristi Henderson MD;  Location:  [facility];  Service: Robotics - DaVinci;  Laterality: N/A;    COLONOSCOPY  01/2022    COLOSTOMY N/A 7/15/2022    Procedure: COLOSTOMY REVISION;  Surgeon: Kristi Henderson MD;  Location:  [facility];  Service: General;  Laterality: N/A;    CORONARY ARTERY BYPASS GRAFT  1992    2 VESSEL    CYSTOSCOPY W/ URETERAL STENT PLACEMENT Bilateral 7/13/2022    Procedure: CYSTOSCOPY URETERAL CATHETER/STENT INSERTION;  Surgeon: Kristi Henderson MD;  Location:  [facility];  Service: Robotics - DaVinci;  Laterality: Bilateral;        MEDICATIONS:    Current Outpatient Medications:     CBD  "(cannabidiol) oral oil, Take 1 drop by mouth Daily., Disp: , Rfl:     coenzyme Q10 100 MG capsule, Take 1 capsule by mouth Daily., Disp: , Rfl:     levothyroxine (SYNTHROID, LEVOTHROID) 88 MCG tablet, Take 1 tablet by mouth Daily., Disp: , Rfl:     magnesium chloride ER 64 MG DR tablet, Take 2 tablets by mouth Every Night., Disp: , Rfl:     Probiotic Product (PROBIOTIC ADVANCED PO), Take 1 capsule by mouth Daily., Disp: , Rfl:     sertraline (ZOLOFT) 25 MG tablet, Take 1 tablet by mouth Daily., Disp: , Rfl:     Turmeric 500 MG capsule, Take 3 capsules by mouth Daily., Disp: , Rfl:     Zinc 50 MG tablet, Take 1 tablet by mouth Daily., Disp: , Rfl:     fidaxomicin (DIFICID) 200 MG tablet, Take 1 tablet by mouth 2 (Two) Times a Day. (Patient not taking: Reported on 2023), Disp: , Rfl:     traMADol (ULTRAM) 50 MG tablet, Take 1 tablet by mouth Every 6 (Six) Hours As Needed for Moderate Pain . (Patient not taking: Reported on 2023), Disp: 4 tablet, Rfl: 0    ALLERGIES  Allergies   Allergen Reactions    Tetracycline Rash       FAMILY HISTORY:  Family History   Problem Relation Age of Onset    Colon cancer Sister        SOCIAL HISTORY  Social History     Socioeconomic History    Marital status:    Tobacco Use    Smoking status: Former     Types: Cigarettes     Quit date:      Years since quittin.6    Smokeless tobacco: Never   Vaping Use    Vaping Use: Never used   Substance and Sexual Activity    Alcohol use: Not Currently     Alcohol/week: 1.0 - 2.0 standard drink     Types: 1 - 2 Glasses of wine per week    Drug use: Never    Sexual activity: Defer         PHYSICAL EXAM   /80 (BP Location: Left arm, Patient Position: Sitting, Cuff Size: Adult)   Pulse 64   Temp 97.3 øF (36.3 øC) (Temporal)   Ht 154.9 cm (60.98\")   Wt 64.6 kg (142 lb 6.4 oz)   BMI 26.92 kg/mý   Physical Exam  Constitutional:       Appearance: Normal appearance.   HENT:      Head: Normocephalic and atraumatic. "   Pulmonary:      Effort: Pulmonary effort is normal.   Abdominal:      Comments: Colostomy present, herniation    Neurological:      Mental Status: She is alert and oriented to person, place, and time.   Psychiatric:         Mood and Affect: Mood normal.         Thought Content: Thought content normal.         ASSESSMENT / PLAN  Diagnoses and all orders for this visit:    1. Hernia due to colostomy (Primary)  -     Ambulatory Referral to General Surgery    >> Discussed with patient that unfortunately, gastroenterology is an inappropriate referral to discuss surgical matter such as hernia repair.  She needs to see another colorectal surgeon versus a general surgeon.  We will refer her to general surgery for further evaluation.        I discussed the patients findings and my recommendations with patient    Montrell Felix, VARUN

## 2024-01-01 ENCOUNTER — APPOINTMENT (OUTPATIENT)
Dept: CT IMAGING | Facility: HOSPITAL | Age: 79
End: 2024-01-01
Payer: MEDICARE

## 2024-01-01 ENCOUNTER — HOSPITAL ENCOUNTER (OUTPATIENT)
Facility: HOSPITAL | Age: 79
Setting detail: OBSERVATION
End: 2024-04-22
Attending: EMERGENCY MEDICINE | Admitting: HOSPITALIST
Payer: MEDICARE

## 2024-01-01 VITALS
DIASTOLIC BLOOD PRESSURE: 65 MMHG | RESPIRATION RATE: 20 BRPM | OXYGEN SATURATION: 94 % | TEMPERATURE: 98 F | HEIGHT: 61 IN | WEIGHT: 142.42 LBS | SYSTOLIC BLOOD PRESSURE: 91 MMHG | BODY MASS INDEX: 26.89 KG/M2 | HEART RATE: 89 BPM

## 2024-01-01 DIAGNOSIS — C18.9 METASTATIC COLON CANCER TO LIVER: ICD-10-CM

## 2024-01-01 DIAGNOSIS — R10.84 GENERALIZED ABDOMINAL PAIN: Primary | ICD-10-CM

## 2024-01-01 DIAGNOSIS — R91.8 LUNG NODULES: ICD-10-CM

## 2024-01-01 DIAGNOSIS — C78.7 METASTATIC COLON CANCER TO LIVER: ICD-10-CM

## 2024-01-01 DIAGNOSIS — R73.09 ELEVATED GLUCOSE: ICD-10-CM

## 2024-01-01 DIAGNOSIS — E87.20 LACTIC ACIDOSIS: ICD-10-CM

## 2024-01-01 LAB
ALBUMIN SERPL-MCNC: 3.6 G/DL (ref 3.5–5.2)
ALBUMIN/GLOB SERPL: 1.3 G/DL
ALP SERPL-CCNC: 79 U/L (ref 39–117)
ALT SERPL W P-5'-P-CCNC: 6 U/L (ref 1–33)
ANION GAP SERPL CALCULATED.3IONS-SCNC: 18 MMOL/L (ref 5–15)
AST SERPL-CCNC: 12 U/L (ref 1–32)
BASOPHILS # BLD AUTO: 0.02 10*3/MM3 (ref 0–0.2)
BASOPHILS NFR BLD AUTO: 0.4 % (ref 0–1.5)
BILIRUB SERPL-MCNC: 0.6 MG/DL (ref 0–1.2)
BUN SERPL-MCNC: 19 MG/DL (ref 8–23)
BUN/CREAT SERPL: 23.5 (ref 7–25)
CALCIUM SPEC-SCNC: 8.8 MG/DL (ref 8.6–10.5)
CHLORIDE SERPL-SCNC: 102 MMOL/L (ref 98–107)
CO2 SERPL-SCNC: 18 MMOL/L (ref 22–29)
CREAT SERPL-MCNC: 0.81 MG/DL (ref 0.57–1)
D-LACTATE SERPL-SCNC: 2.6 MMOL/L (ref 0.5–2)
D-LACTATE SERPL-SCNC: 7 MMOL/L (ref 0.5–2)
DEPRECATED RDW RBC AUTO: 43.6 FL (ref 37–54)
EGFRCR SERPLBLD CKD-EPI 2021: 73.9 ML/MIN/1.73
EOSINOPHIL # BLD AUTO: 0.02 10*3/MM3 (ref 0–0.4)
EOSINOPHIL NFR BLD AUTO: 0.4 % (ref 0.3–6.2)
ERYTHROCYTE [DISTWIDTH] IN BLOOD BY AUTOMATED COUNT: 13 % (ref 12.3–15.4)
FLUAV RNA RESP QL NAA+PROBE: NOT DETECTED
FLUBV RNA RESP QL NAA+PROBE: NOT DETECTED
GEN 5 2HR TROPONIN T REFLEX: 4448 NG/L
GLOBULIN UR ELPH-MCNC: 2.8 GM/DL
GLUCOSE SERPL-MCNC: 227 MG/DL (ref 65–99)
HCT VFR BLD AUTO: 42 % (ref 34–46.6)
HGB BLD-MCNC: 13.1 G/DL (ref 12–15.9)
HOLD SPECIMEN: NORMAL
IMM GRANULOCYTES # BLD AUTO: 0 10*3/MM3 (ref 0–0.05)
IMM GRANULOCYTES NFR BLD AUTO: 0 % (ref 0–0.5)
LYMPHOCYTES # BLD AUTO: 0.41 10*3/MM3 (ref 0.7–3.1)
LYMPHOCYTES NFR BLD AUTO: 8.2 % (ref 19.6–45.3)
MCH RBC QN AUTO: 28.5 PG (ref 26.6–33)
MCHC RBC AUTO-ENTMCNC: 31.2 G/DL (ref 31.5–35.7)
MCV RBC AUTO: 91.3 FL (ref 79–97)
MONOCYTES # BLD AUTO: 0.02 10*3/MM3 (ref 0.1–0.9)
MONOCYTES NFR BLD AUTO: 0.4 % (ref 5–12)
NEUTROPHILS NFR BLD AUTO: 4.53 10*3/MM3 (ref 1.7–7)
NEUTROPHILS NFR BLD AUTO: 90.6 % (ref 42.7–76)
NRBC BLD AUTO-RTO: 0 /100 WBC (ref 0–0.2)
NT-PROBNP SERPL-MCNC: 131.5 PG/ML (ref 0–1800)
PLATELET # BLD AUTO: 262 10*3/MM3 (ref 140–450)
PMV BLD AUTO: 10.1 FL (ref 6–12)
POTASSIUM SERPL-SCNC: 4.1 MMOL/L (ref 3.5–5.2)
PROCALCITONIN SERPL-MCNC: 0.15 NG/ML (ref 0–0.25)
PROT SERPL-MCNC: 6.4 G/DL (ref 6–8.5)
QT INTERVAL: 406 MS
QT INTERVAL: 412 MS
QTC INTERVAL: 491 MS
QTC INTERVAL: 539 MS
RBC # BLD AUTO: 4.6 10*6/MM3 (ref 3.77–5.28)
SARS-COV-2 RNA RESP QL NAA+PROBE: NOT DETECTED
SODIUM SERPL-SCNC: 138 MMOL/L (ref 136–145)
TROPONIN T DELTA: 4379 NG/L
TROPONIN T SERPL HS-MCNC: 69 NG/L
WBC NRBC COR # BLD AUTO: 5 10*3/MM3 (ref 3.4–10.8)
WHOLE BLOOD HOLD COAG: NORMAL
WHOLE BLOOD HOLD SPECIMEN: NORMAL

## 2024-01-01 PROCEDURE — 84484 ASSAY OF TROPONIN QUANT: CPT | Performed by: EMERGENCY MEDICINE

## 2024-01-01 PROCEDURE — 93010 ELECTROCARDIOGRAM REPORT: CPT | Performed by: INTERNAL MEDICINE

## 2024-01-01 PROCEDURE — 96361 HYDRATE IV INFUSION ADD-ON: CPT

## 2024-01-01 PROCEDURE — 36415 COLL VENOUS BLD VENIPUNCTURE: CPT

## 2024-01-01 PROCEDURE — 87076 CULTURE ANAEROBE IDENT EACH: CPT | Performed by: EMERGENCY MEDICINE

## 2024-01-01 PROCEDURE — 99239 HOSP IP/OBS DSCHRG MGMT >30: CPT | Performed by: HOSPITALIST

## 2024-01-01 PROCEDURE — G0378 HOSPITAL OBSERVATION PER HR: HCPCS

## 2024-01-01 PROCEDURE — 87186 SC STD MICRODIL/AGAR DIL: CPT | Performed by: EMERGENCY MEDICINE

## 2024-01-01 PROCEDURE — 96376 TX/PRO/DX INJ SAME DRUG ADON: CPT

## 2024-01-01 PROCEDURE — 25010000002 ONDANSETRON PER 1 MG: Performed by: EMERGENCY MEDICINE

## 2024-01-01 PROCEDURE — 25010000002 HALOPERIDOL LACTATE PER 5 MG: Performed by: FAMILY MEDICINE

## 2024-01-01 PROCEDURE — 25010000002 KETOROLAC TROMETHAMINE PER 15 MG: Performed by: INTERNAL MEDICINE

## 2024-01-01 PROCEDURE — 25810000003 SODIUM CHLORIDE 0.9 % SOLUTION: Performed by: INTERNAL MEDICINE

## 2024-01-01 PROCEDURE — 87040 BLOOD CULTURE FOR BACTERIA: CPT | Performed by: EMERGENCY MEDICINE

## 2024-01-01 PROCEDURE — 83880 ASSAY OF NATRIURETIC PEPTIDE: CPT | Performed by: EMERGENCY MEDICINE

## 2024-01-01 PROCEDURE — 85025 COMPLETE CBC W/AUTO DIFF WBC: CPT | Performed by: EMERGENCY MEDICINE

## 2024-01-01 PROCEDURE — 25510000001 IOPAMIDOL PER 1 ML: Performed by: EMERGENCY MEDICINE

## 2024-01-01 PROCEDURE — 74177 CT ABD & PELVIS W/CONTRAST: CPT

## 2024-01-01 PROCEDURE — 87185 SC STD ENZYME DETCJ PER NZM: CPT | Performed by: EMERGENCY MEDICINE

## 2024-01-01 PROCEDURE — 25010000002 HYDROMORPHONE PER 4 MG: Performed by: INTERNAL MEDICINE

## 2024-01-01 PROCEDURE — 93005 ELECTROCARDIOGRAM TRACING: CPT | Performed by: INTERNAL MEDICINE

## 2024-01-01 PROCEDURE — 25010000002 HYDROMORPHONE PER 4 MG: Performed by: FAMILY MEDICINE

## 2024-01-01 PROCEDURE — 84145 PROCALCITONIN (PCT): CPT | Performed by: EMERGENCY MEDICINE

## 2024-01-01 PROCEDURE — 25010000002 FUROSEMIDE PER 20 MG: Performed by: FAMILY MEDICINE

## 2024-01-01 PROCEDURE — 96374 THER/PROPH/DIAG INJ IV PUSH: CPT

## 2024-01-01 PROCEDURE — 96375 TX/PRO/DX INJ NEW DRUG ADDON: CPT

## 2024-01-01 PROCEDURE — 25810000003 SODIUM CHLORIDE 0.9 % SOLUTION: Performed by: EMERGENCY MEDICINE

## 2024-01-01 PROCEDURE — 99223 1ST HOSP IP/OBS HIGH 75: CPT | Performed by: INTERNAL MEDICINE

## 2024-01-01 PROCEDURE — 25010000002 HYDROMORPHONE PER 4 MG: Performed by: EMERGENCY MEDICINE

## 2024-01-01 PROCEDURE — 87077 CULTURE AEROBIC IDENTIFY: CPT | Performed by: EMERGENCY MEDICINE

## 2024-01-01 PROCEDURE — 71275 CT ANGIOGRAPHY CHEST: CPT

## 2024-01-01 PROCEDURE — 83605 ASSAY OF LACTIC ACID: CPT | Performed by: EMERGENCY MEDICINE

## 2024-01-01 PROCEDURE — 99285 EMERGENCY DEPT VISIT HI MDM: CPT

## 2024-01-01 PROCEDURE — 87636 SARSCOV2 & INF A&B AMP PRB: CPT | Performed by: EMERGENCY MEDICINE

## 2024-01-01 PROCEDURE — 25010000002 ONDANSETRON PER 1 MG: Performed by: INTERNAL MEDICINE

## 2024-01-01 PROCEDURE — 80053 COMPREHEN METABOLIC PANEL: CPT | Performed by: EMERGENCY MEDICINE

## 2024-01-01 PROCEDURE — 93005 ELECTROCARDIOGRAM TRACING: CPT | Performed by: EMERGENCY MEDICINE

## 2024-01-01 RX ORDER — ONDANSETRON 4 MG/1
4 TABLET, ORALLY DISINTEGRATING ORAL EVERY 6 HOURS PRN
Status: DISCONTINUED | OUTPATIENT
Start: 2024-01-01 | End: 2024-01-01 | Stop reason: HOSPADM

## 2024-01-01 RX ORDER — ONDANSETRON 2 MG/ML
4 INJECTION INTRAMUSCULAR; INTRAVENOUS EVERY 6 HOURS PRN
Status: DISCONTINUED | OUTPATIENT
Start: 2024-01-01 | End: 2024-01-01 | Stop reason: HOSPADM

## 2024-01-01 RX ORDER — BISACODYL 5 MG/1
5 TABLET, DELAYED RELEASE ORAL DAILY PRN
Status: DISCONTINUED | OUTPATIENT
Start: 2024-01-01 | End: 2024-01-01 | Stop reason: HOSPADM

## 2024-01-01 RX ORDER — SODIUM CHLORIDE 0.9 % (FLUSH) 0.9 %
10 SYRINGE (ML) INJECTION EVERY 12 HOURS SCHEDULED
Status: DISCONTINUED | OUTPATIENT
Start: 2024-01-01 | End: 2024-01-01 | Stop reason: HOSPADM

## 2024-01-01 RX ORDER — KETOROLAC TROMETHAMINE 15 MG/ML
15 INJECTION, SOLUTION INTRAMUSCULAR; INTRAVENOUS EVERY 6 HOURS PRN
Status: DISCONTINUED | OUTPATIENT
Start: 2024-01-01 | End: 2024-01-01 | Stop reason: HOSPADM

## 2024-01-01 RX ORDER — AMOXICILLIN 250 MG
2 CAPSULE ORAL 2 TIMES DAILY PRN
Status: DISCONTINUED | OUTPATIENT
Start: 2024-01-01 | End: 2024-01-01 | Stop reason: HOSPADM

## 2024-01-01 RX ORDER — FUROSEMIDE 10 MG/ML
40 INJECTION INTRAMUSCULAR; INTRAVENOUS ONCE
Status: COMPLETED | OUTPATIENT
Start: 2024-01-01 | End: 2024-01-01

## 2024-01-01 RX ORDER — ASPIRIN 81 MG/1
324 TABLET, CHEWABLE ORAL ONCE
Status: DISCONTINUED | OUTPATIENT
Start: 2024-01-01 | End: 2024-01-01

## 2024-01-01 RX ORDER — MIDODRINE HYDROCHLORIDE 10 MG/1
10 TABLET ORAL
Status: DISCONTINUED | OUTPATIENT
Start: 2024-01-01 | End: 2024-01-01 | Stop reason: HOSPADM

## 2024-01-01 RX ORDER — OXYCODONE HYDROCHLORIDE 5 MG/1
5 TABLET ORAL EVERY 4 HOURS PRN
Status: DISCONTINUED | OUTPATIENT
Start: 2024-01-01 | End: 2024-01-01 | Stop reason: HOSPADM

## 2024-01-01 RX ORDER — LEVOTHYROXINE SODIUM 88 UG/1
88 TABLET ORAL
Status: DISCONTINUED | OUTPATIENT
Start: 2024-01-01 | End: 2024-01-01

## 2024-01-01 RX ORDER — HYDROMORPHONE HYDROCHLORIDE 1 MG/ML
0.5 INJECTION, SOLUTION INTRAMUSCULAR; INTRAVENOUS; SUBCUTANEOUS
Status: DISCONTINUED | OUTPATIENT
Start: 2024-01-01 | End: 2024-01-01 | Stop reason: HOSPADM

## 2024-01-01 RX ORDER — BISACODYL 10 MG
10 SUPPOSITORY, RECTAL RECTAL DAILY PRN
Status: DISCONTINUED | OUTPATIENT
Start: 2024-01-01 | End: 2024-01-01 | Stop reason: HOSPADM

## 2024-01-01 RX ORDER — HYDROMORPHONE HYDROCHLORIDE 1 MG/ML
0.25 INJECTION, SOLUTION INTRAMUSCULAR; INTRAVENOUS; SUBCUTANEOUS ONCE
Status: COMPLETED | OUTPATIENT
Start: 2024-01-01 | End: 2024-01-01

## 2024-01-01 RX ORDER — L.ACID,PARA/B.BIFIDUM/S.THERM 8B CELL
1 CAPSULE ORAL DAILY
Status: DISCONTINUED | OUTPATIENT
Start: 2024-01-01 | End: 2024-01-01

## 2024-01-01 RX ORDER — SODIUM CHLORIDE 9 MG/ML
125 INJECTION, SOLUTION INTRAVENOUS CONTINUOUS
Status: DISCONTINUED | OUTPATIENT
Start: 2024-01-01 | End: 2024-01-01

## 2024-01-01 RX ORDER — SODIUM CHLORIDE 0.9 % (FLUSH) 0.9 %
10 SYRINGE (ML) INJECTION AS NEEDED
Status: DISCONTINUED | OUTPATIENT
Start: 2024-01-01 | End: 2024-01-01 | Stop reason: HOSPADM

## 2024-01-01 RX ORDER — ONDANSETRON 2 MG/ML
4 INJECTION INTRAMUSCULAR; INTRAVENOUS ONCE
Status: COMPLETED | OUTPATIENT
Start: 2024-01-01 | End: 2024-01-01

## 2024-01-01 RX ORDER — SODIUM CHLORIDE 9 MG/ML
40 INJECTION, SOLUTION INTRAVENOUS AS NEEDED
Status: DISCONTINUED | OUTPATIENT
Start: 2024-01-01 | End: 2024-01-01 | Stop reason: HOSPADM

## 2024-01-01 RX ORDER — HYDROMORPHONE HYDROCHLORIDE 1 MG/ML
0.25 INJECTION, SOLUTION INTRAMUSCULAR; INTRAVENOUS; SUBCUTANEOUS
Status: DISCONTINUED | OUTPATIENT
Start: 2024-01-01 | End: 2024-01-01

## 2024-01-01 RX ORDER — POLYETHYLENE GLYCOL 3350 17 G/17G
17 POWDER, FOR SOLUTION ORAL DAILY PRN
Status: DISCONTINUED | OUTPATIENT
Start: 2024-01-01 | End: 2024-01-01 | Stop reason: HOSPADM

## 2024-01-01 RX ORDER — HALOPERIDOL 5 MG/ML
0.5 INJECTION INTRAMUSCULAR 4 TIMES DAILY PRN
Status: DISCONTINUED | OUTPATIENT
Start: 2024-01-01 | End: 2024-01-01 | Stop reason: HOSPADM

## 2024-01-01 RX ORDER — ENOXAPARIN SODIUM 100 MG/ML
40 INJECTION SUBCUTANEOUS DAILY
Status: DISCONTINUED | OUTPATIENT
Start: 2024-01-01 | End: 2024-01-01

## 2024-01-01 RX ORDER — CLOPIDOGREL BISULFATE 75 MG/1
150 TABLET ORAL ONCE
Status: DISCONTINUED | OUTPATIENT
Start: 2024-01-01 | End: 2024-01-01

## 2024-01-01 RX ADMIN — HALOPERIDOL LACTATE 0.5 MG: 5 INJECTION, SOLUTION INTRAMUSCULAR at 19:28

## 2024-01-01 RX ADMIN — HALOPERIDOL LACTATE 0.5 MG: 5 INJECTION, SOLUTION INTRAMUSCULAR at 03:30

## 2024-01-01 RX ADMIN — HYDROMORPHONE HYDROCHLORIDE 0.25 MG: 1 INJECTION, SOLUTION INTRAMUSCULAR; INTRAVENOUS; SUBCUTANEOUS at 18:57

## 2024-01-01 RX ADMIN — FUROSEMIDE 40 MG: 10 INJECTION, SOLUTION INTRAMUSCULAR; INTRAVENOUS at 19:30

## 2024-01-01 RX ADMIN — HYDROMORPHONE HYDROCHLORIDE 0.25 MG: 1 INJECTION, SOLUTION INTRAMUSCULAR; INTRAVENOUS; SUBCUTANEOUS at 07:26

## 2024-01-01 RX ADMIN — IOPAMIDOL 85 ML: 755 INJECTION, SOLUTION INTRAVENOUS at 07:53

## 2024-01-01 RX ADMIN — SODIUM CHLORIDE 1938 ML: 9 INJECTION, SOLUTION INTRAVENOUS at 09:27

## 2024-01-01 RX ADMIN — ONDANSETRON 4 MG: 2 INJECTION INTRAMUSCULAR; INTRAVENOUS at 18:24

## 2024-01-01 RX ADMIN — HYDROMORPHONE HYDROCHLORIDE 0.5 MG: 1 INJECTION, SOLUTION INTRAMUSCULAR; INTRAVENOUS; SUBCUTANEOUS at 06:14

## 2024-01-01 RX ADMIN — SODIUM CHLORIDE 125 ML/HR: 9 INJECTION, SOLUTION INTRAVENOUS at 18:27

## 2024-01-01 RX ADMIN — HYDROMORPHONE HYDROCHLORIDE 0.5 MG: 1 INJECTION, SOLUTION INTRAMUSCULAR; INTRAVENOUS; SUBCUTANEOUS at 19:29

## 2024-01-01 RX ADMIN — SODIUM CHLORIDE 125 ML/HR: 9 INJECTION, SOLUTION INTRAVENOUS at 10:43

## 2024-01-01 RX ADMIN — KETOROLAC TROMETHAMINE 15 MG: 15 INJECTION, SOLUTION INTRAMUSCULAR; INTRAVENOUS at 12:10

## 2024-01-01 RX ADMIN — ONDANSETRON 4 MG: 2 INJECTION INTRAMUSCULAR; INTRAVENOUS at 12:10

## 2024-01-01 RX ADMIN — SODIUM CHLORIDE 500 ML: 9 INJECTION, SOLUTION INTRAVENOUS at 07:25

## 2024-01-01 RX ADMIN — HYDROMORPHONE HYDROCHLORIDE 0.5 MG: 1 INJECTION, SOLUTION INTRAMUSCULAR; INTRAVENOUS; SUBCUTANEOUS at 02:18

## 2024-01-01 RX ADMIN — Medication 10 ML: at 20:08

## 2024-01-01 RX ADMIN — ONDANSETRON 4 MG: 2 INJECTION INTRAMUSCULAR; INTRAVENOUS at 07:26

## 2024-01-01 RX ADMIN — MIDODRINE HYDROCHLORIDE 10 MG: 10 TABLET ORAL at 18:08

## 2024-04-21 PROBLEM — J96.00 ACUTE RESPIRATORY FAILURE: Status: ACTIVE | Noted: 2024-01-01

## 2024-04-21 NOTE — ED PROVIDER NOTES
Subjective   History of Present Illness  This is a pleasant 78-year-old female who is accompanied by her daughter.  Baseline she lives independently is not on oxygen or CPAP can go out to the store drive herself does light housework.  She was out to the store as recently as Friday of this past week.    Somewhat of a complex past medical history including colectomy in July 2022 by Dr. Brito for colon cancer still has an ostomy and has a hernia is waiting to talk to another surgeon about repair of this.  Also recent renal history she was seeing Dr. Vazquez at  and had a right ureteral stenosis of unclear etiology had have a stent placed on the eighth of last month for this.    Subsequently developed infection and was prescribed antibiotics but she did not take them as she was worried about the side effects from them.  This is a week or 2 ago.    Over the past 24 hours she is decompensated with increased flank pain associate with nausea vomiting chills and subjective fever.  She just felt miserable become weaker and called EMS to bring her to the hospital.    She really did not want to go back to  and wanted to get her care here so she came here for further evaluation.    She was noted to be hypoxic and requiring supplemental oxygen sounds like there is a possibility she could have aspirated.  In general she just feels poorly and still nauseated still has pain.    Patient is actually a bit lethargic currently and her daughter does a lot of the history for.  I did my best with further review of systems and history and chart review.        Review of Systems   All other systems reviewed and are negative.      Past Medical History:   Diagnosis Date    Clostridium difficile infection 07/2022    Colon polyps     Coronary artery disease     Depression     Disease of thyroid gland     Elevated cholesterol     Hiatal hernia     Rectal cancer    Op note  3/8/24  Karolina Cardoso is a 78 y.o. female with right  hydroureteronephrosis down to level of the bladder, of unknown etiology, with increasing right flank pain. She is afebrile, hemodynamically stable, and labs are unremarkable. UA has leukocytes and blood, no nitrites, bacteria present. She just finished 10 day course of cefodroxil. She was taken to the OR 3-8-24 for cystoscopy with right retrograde pyelogram and right ureteral stent placement. The study demonstrated focal narrowing of her distal right ureter, and her ureter was too narrow to access with a ureteroscope. She tolerated the procedure well. She will be discharged with medications for stent discomfort. We will plan for cystoscopy with repeat retrograde pyelogram and possible diagnostic ureteroscopy with ureteral stent exchange in three months.     Surgeries and Procedures  Procedures performed in this encounter   Procedures   Case Request Operating Room: CYSTOSCOPY, WITH URETERAL STENT INSERTION   [place] echo 3/22  This result has an attachment that is not available.     Left Ventricle: The left ventricle is normal size. There is normal left   ventricular myocardial thickness and mass. The left ventricular systolic   function is borderline reduced. The LVEF as measured by biplane volume is   52%. The inferolateral, inferior and inferoseptal walls are hypokinetic.   The left ventricular filling pressure is normal.     Right Ventricle: Right ventricle size is normal. The right ventricular   systolic function is normal.     Valves: Mild aortic regurigtation.     Pericardium: No pericardial effusion.   Allergies   Allergen Reactions    Tetracycline Rash       Past Surgical History:   Procedure Laterality Date    CARDIAC CATHETERIZATION  1992    COLON RESECTION N/A 7/13/2022    Procedure: ROBOTIC LOW ANTERIOR RESECTION , COLOSTOMY;  Surgeon: Kristi Henderson MD;  Location[facility];  [facility]  Laterality: N/A;    COLONOSCOPY  01/2022    COLOSTOMY N/A 7/15/2022    Procedure: COLOSTOMY REVISION;   Surgeon: Kristi Henderson MD;  Location:  [facility];  Service: General;  Laterality: N/A;    CORONARY ARTERY BYPASS GRAFT      2 VESSEL    CYSTOSCOPY W/ URETERAL STENT PLACEMENT Bilateral 2022    Procedure: CYSTOSCOPY URETERAL CATHETER/STENT INSERTION;  Surgeon: Kristi Henderson MD;  Location:  [facility];  Service: Robotics - [employer];  Laterality: Bilateral;       Family History   Problem Relation Age of Onset    Colon cancer Sister        Social History     Socioeconomic History    Marital status:    Tobacco Use    Smoking status: Former     Current packs/day: 0.00     Types: Cigarettes     Quit date:      Years since quittin.3    Smokeless tobacco: Never   Vaping Use    Vaping status: Never Used   Substance and Sexual Activity    Alcohol use: Not Currently     Alcohol/week: 1.0 - 2.0 standard drink of alcohol     Types: 1 - 2 Glasses of wine per week    Drug use: Never    Sexual activity: Defer           Objective   Physical Exam  Vitals and nursing note reviewed.   Constitutional:       Comments: This is a 78-year-old who is looks like she does not feel well she is little bit lethargic she is able to give some history but just quickly falls back to sleep.  She does not appear to be in any distress.   HENT:      Head: Normocephalic and atraumatic.      Right Ear: External ear normal.      Left Ear: External ear normal.      Nose: Nose normal.      Mouth/Throat:      Comments: Her mouth is little bit dry.  Eyes:      Extraocular Movements: Extraocular movements intact.      Conjunctiva/sclera: Conjunctivae normal.      Pupils: Pupils are equal, round, and reactive to light.   Neck:      Vascular: No carotid bruit.   Cardiovascular:      Rate and Rhythm: Normal rate and regular rhythm.      Pulses: Normal pulses.      Heart sounds: Normal heart sounds.   Pulmonary:      Comments: Decreased breath sounds bilaterally.  Abdominal:      Comments: Ostomy on the left side producing stool no blood.   Positive bowel sounds soft little bit tenderness in the right side no organomegaly masses or guarding.  Right flank mildly tender to palpation without mass or rash.  BMI 26.   Musculoskeletal:      Cervical back: Normal range of motion and neck supple. No rigidity or tenderness.      Comments: Equal pulses in all 4 extremities 4/4 trace of edema at the ankles which is chronic for the patient no synovitis rash or venous cords.   Lymphadenopathy:      Cervical: No cervical adenopathy.   Skin:     General: Skin is warm and dry.      Capillary Refill: Capillary refill takes less than 2 seconds.   Neurological:      Comments: Face symmetric voice soft tongue midline.  Vision, hearing, and speech are preserved.  Mild generalized weakness without focality.         Procedures           ED Course                   Recent Results (from the past 24 hour(s))   Green Top (Gel)    Collection Time: 04/21/24  6:54 AM   Result Value Ref Range    Extra Tube Hold for add-ons.    Lavender Top    Collection Time: 04/21/24  6:54 AM   Result Value Ref Range    Extra Tube hold for add-on    Gold Top - SST    Collection Time: 04/21/24  6:54 AM   Result Value Ref Range    Extra Tube Hold for add-ons.    Gray Top    Collection Time: 04/21/24  6:54 AM   Result Value Ref Range    Extra Tube Hold for add-ons.    Light Blue Top    Collection Time: 04/21/24  6:54 AM   Result Value Ref Range    Extra Tube Hold for add-ons.    Comprehensive Metabolic Panel    Collection Time: 04/21/24  6:54 AM    Specimen: Blood   Result Value Ref Range    Glucose 227 (H) 65 - 99 mg/dL    BUN 19 8 - 23 mg/dL    Creatinine 0.81 0.57 - 1.00 mg/dL    Sodium 138 136 - 145 mmol/L    Potassium 4.1 3.5 - 5.2 mmol/L    Chloride 102 98 - 107 mmol/L    CO2 18.0 (L) 22.0 - 29.0 mmol/L    Calcium 8.8 8.6 - 10.5 mg/dL    Total Protein 6.4 6.0 - 8.5 g/dL    Albumin 3.6 3.5 - 5.2 g/dL    ALT (SGPT) 6 1 - 33 U/L    AST (SGOT) 12 1 - 32 U/L    Alkaline Phosphatase 79 39 - 117 U/L     Total Bilirubin 0.6 0.0 - 1.2 mg/dL    Globulin 2.8 gm/dL    A/G Ratio 1.3 g/dL    BUN/Creatinine Ratio 23.5 7.0 - 25.0    Anion Gap 18.0 (H) 5.0 - 15.0 mmol/L    eGFR 73.9 >60.0 mL/min/1.73   BNP    Collection Time: 04/21/24  6:54 AM    Specimen: Blood   Result Value Ref Range    proBNP 131.5 0.0 - 1,800.0 pg/mL   Single High Sensitivity Troponin T    Collection Time: 04/21/24  6:54 AM    Specimen: Blood   Result Value Ref Range    HS Troponin T 69 (C) <14 ng/L   Lactic Acid, Plasma    Collection Time: 04/21/24  6:54 AM    Specimen: Blood   Result Value Ref Range    Lactate 7.0 (C) 0.5 - 2.0 mmol/L   Procalcitonin    Collection Time: 04/21/24  6:54 AM    Specimen: Blood   Result Value Ref Range    Procalcitonin 0.15 0.00 - 0.25 ng/mL   CBC Auto Differential    Collection Time: 04/21/24  6:54 AM    Specimen: Blood   Result Value Ref Range    WBC 5.00 3.40 - 10.80 10*3/mm3    RBC 4.60 3.77 - 5.28 10*6/mm3    Hemoglobin 13.1 12.0 - 15.9 g/dL    Hematocrit 42.0 34.0 - 46.6 %    MCV 91.3 79.0 - 97.0 fL    MCH 28.5 26.6 - 33.0 pg    MCHC 31.2 (L) 31.5 - 35.7 g/dL    RDW 13.0 12.3 - 15.4 %    RDW-SD 43.6 37.0 - 54.0 fl    MPV 10.1 6.0 - 12.0 fL    Platelets 262 140 - 450 10*3/mm3    Neutrophil % 90.6 (H) 42.7 - 76.0 %    Lymphocyte % 8.2 (L) 19.6 - 45.3 %    Monocyte % 0.4 (L) 5.0 - 12.0 %    Eosinophil % 0.4 0.3 - 6.2 %    Basophil % 0.4 0.0 - 1.5 %    Immature Grans % 0.0 0.0 - 0.5 %    Neutrophils, Absolute 4.53 1.70 - 7.00 10*3/mm3    Lymphocytes, Absolute 0.41 (L) 0.70 - 3.10 10*3/mm3    Monocytes, Absolute 0.02 (L) 0.10 - 0.90 10*3/mm3    Eosinophils, Absolute 0.02 0.00 - 0.40 10*3/mm3    Basophils, Absolute 0.02 0.00 - 0.20 10*3/mm3    Immature Grans, Absolute 0.00 0.00 - 0.05 10*3/mm3    nRBC 0.0 0.0 - 0.2 /100 WBC   ECG 12 Lead Electrolyte Imbalance    Collection Time: 04/21/24  7:27 AM   Result Value Ref Range    QT Interval 412 ms    QTC Interval 539 ms   COVID-19 and FLU A/B PCR, 1 HR TAT - Swab, Nasopharynx     Collection Time: 04/21/24  7:44 AM    Specimen: Nasopharynx; Swab   Result Value Ref Range    COVID19 Not Detected Not Detected - Ref. Range    Influenza A PCR Not Detected Not Detected    Influenza B PCR Not Detected Not Detected   STAT Lactic Acid, Reflex    Collection Time: 04/21/24 12:28 PM    Specimen: Blood   Result Value Ref Range    Lactate 2.6 (C) 0.5 - 2.0 mmol/L   High Sensitivity Troponin T 2Hr    Collection Time: 04/21/24 12:28 PM    Specimen: Blood   Result Value Ref Range    HS Troponin T 4,448 (C) <14 ng/L    Troponin T Delta 4,379 (C) >=-4 - <+4 ng/L   ECG 12 Lead Other; tropion increased    Collection Time: 04/21/24  1:20 PM   Result Value Ref Range    QT Interval 406 ms    QTC Interval 491 ms     Note: In addition to lab results from this visit, the labs listed above may include labs taken at another facility or during a different encounter within the last 24 hours. Please correlate lab times with ED admission and discharge times for further clarification of the services performed during this visit.    CT Angiogram Chest   Final Result   Impression:      Chest:   - Numerous new bilateral pulmonary nodules, likely metastatic disease.   - Mildly enlarged bilateral axillary lymph nodes as well as prominent bilateral hilar and mediastinal lymph nodes. Findings are nonspecific and may represent reactive lymphadenopathy and/or rimma metastatic disease.   - No evidence of pulmonary embolism.      Abdomen and pelvis:   - Increased size and number of hepatic metastatic disease.   - Right-sided nephroureteral stent in place with findings of obstruction including moderate hydroureteronephrosis and slightly delayed nephrogram. There is diffuse urothelial enhancement without evidence of renal abscess, as questioned.    - Postsurgical changes of low anterior resection and left lower quadrant colostomy with parastomal hernia.         Electronically Signed: Kamron Simons MD     4/21/2024 8:23 AM EDT      Workstation ID: [id number]      CT Abdomen Pelvis With Contrast   Final Result   Impression:      Chest:   - Numerous new bilateral pulmonary nodules, likely metastatic disease.   - Mildly enlarged bilateral axillary lymph nodes as well as prominent bilateral hilar and mediastinal lymph nodes. Findings are nonspecific and may represent reactive lymphadenopathy and/or rimma metastatic disease.   - No evidence of pulmonary embolism.      Abdomen and pelvis:   - Increased size and number of hepatic metastatic disease.   - Right-sided nephroureteral stent in place with findings of obstruction including moderate hydroureteronephrosis and slightly delayed nephrogram. There is diffuse urothelial enhancement without evidence of renal abscess, as questioned.    - Postsurgical changes of low anterior resection and left lower quadrant colostomy with parastomal hernia.         Electronically Signed: Kamron Simons MD     4/21/2024 8:23 AM EDT     Workstation ID: [id number]        Vitals:    04/21/24 0843 04/21/24 0900 04/21/24 0930 04/21/24 1100   BP: 99/59 97/64 91/65    BP Location:    Left arm   Patient Position:    Lying   Pulse: 87 85 89    Resp:    20   Temp:    98 °F (36.7 °C)   TempSrc:    Oral   SpO2: 93% 93% 94%    Weight:       Height:         Medications   sodium chloride 0.9 % infusion (125 mL/hr Intravenous New Bag 4/21/24 1043)   levothyroxine (SYNTHROID, LEVOTHROID) tablet 88 mcg (has no administration in time range)   lactobacillus acidophilus (RISAQUAD) capsule 1 capsule (has no administration in time range)   sertraline (ZOLOFT) tablet 25 mg (has no administration in time range)   sodium chloride 0.9 % flush 10 mL (has no administration in time range)   sodium chloride 0.9 % flush 10 mL (has no administration in time range)   sodium chloride 0.9 % infusion 40 mL (has no administration in time range)   Enoxaparin Sodium (LOVENOX) syringe 40 mg (40 mg Subcutaneous Not Given 4/21/24 1137)   Potassium Replacement -  Follow Nurse / BPA Driven Protocol (has no administration in time range)   Magnesium Standard Dose Replacement - Follow Nurse / BPA Driven Protocol (has no administration in time range)   Phosphorus Replacement - Follow Nurse / BPA Driven Protocol (has no administration in time range)   Calcium Replacement - Follow Nurse / BPA Driven Protocol (has no administration in time range)   oxyCODONE (ROXICODONE) immediate release tablet 5 mg (has no administration in time range)   HYDROmorphone (DILAUDID) injection 0.25 mg (has no administration in time range)   sennosides-docusate (PERICOLACE) 8.6-50 MG per tablet 2 tablet (has no administration in time range)     And   polyethylene glycol (MIRALAX) packet 17 g (has no administration in time range)     And   bisacodyl (DULCOLAX) EC tablet 5 mg (has no administration in time range)     And   bisacodyl (DULCOLAX) suppository 10 mg (has no administration in time range)   ondansetron ODT (ZOFRAN-ODT) disintegrating tablet 4 mg ( Oral Not Given:  See Alt 4/21/24 1210)     Or   ondansetron (ZOFRAN) injection 4 mg (4 mg Intravenous Given 4/21/24 1210)   ketorolac (TORADOL) injection 15 mg (15 mg Intravenous Given 4/21/24 1210)   midodrine (PROAMATINE) tablet 10 mg (has no administration in time range)   sodium chloride 0.9 % bolus 500 mL (0 mL Intravenous Stopped 4/21/24 0927)   HYDROmorphone (DILAUDID) injection 0.25 mg (0.25 mg Intravenous Given 4/21/24 0726)   ondansetron (ZOFRAN) injection 4 mg (4 mg Intravenous Given 4/21/24 0726)   sodium chloride 0.9 % bolus 1,938 mL (1,938 mL Intravenous New Bag 4/21/24 0927)   iopamidol (ISOVUE-370) 76 % injection 100 mL (85 mL Intravenous Given 4/21/24 0753)     ECG/EMG Results (last 24 hours)       ** No results found for the last 24 hours. **          ECG 12 Lead Other; tropion increased   Preliminary Result   Test Reason : Other~   Blood Pressure :   */*   mmHG   Vent. Rate :  88 BPM     Atrial Rate :  88 BPM      P-R Int : 158 ms           QRS Dur :  80 ms       QT Int : 406 ms       P-R-T Axes :  65 -16  13 degrees      QTc Int : 491 ms      Sinus rhythm with occasional and consecutive premature ventricular    complexes and fusion complexes   Inferior infarct (cited on or before 21-APR-2024)   Abnormal ECG   When compared with ECG of 21-APR-2024 07:27,   fusion complexes are now present   premature ventricular complexes are now present   Questionable change in QRS duration   Criteria for Anterior infarct are no longer present   Criteria for Anterolateral infarct are no longer present      Referred By:            Confirmed By:       ECG 12 Lead Electrolyte Imbalance   Final Result   Test Reason : Electrolyte Imbalance   Blood Pressure :   */*   mmHG   Vent. Rate : 103 BPM     Atrial Rate : 103 BPM      P-R Int : 200 ms          QRS Dur : 130 ms       QT Int : 412 ms       P-R-T Axes :   * -87  84 degrees      QTc Int : 539 ms      Sinus tachycardia   Left axis deviation   Nonspecific intraventricular block   Inferior infarct , age undetermined   Anterolateral infarct , age undetermined   Abnormal ECG   When compared with ECG of 12-JUL-2022 09:00,   Significant changes have occurred   suspect rate dependent bundle branch block   Confirmed by LORENZO REECE MD ([id number]) on 4/21/2024 7:30:41 AM      Referred By: EDMD           Confirmed By: LORENZO REECE MD                                    Medical Decision Making      I have reviewed all available studies at bedside with the patient and her family.    I was able to review some of her  medical records including his CT scan she had in February which showed what appeared to be metastatic cancer in her discussion with her primary care provider at  regarding this.    She did have her stent placed last month at [facility] talk to Dr. Naranjo today on-call for urology.  He had seen the patient previously when she had her initial surgery 2 years ago he would be willing to follow her should she  need manipulation of her stent.  I think this is likely not a big issue for the patient currently.  Urine is pending at the time of admission will be followed by the admitting provider.    Unfortunately it looks like she has had fairly aggressive spread of her metastatic cancer likely primary colon cancer.  Accompanied by lactic acidosis but she has no evidence of pulmonary embolus.    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    She really does not want any aggressive treatment of her cancer.  With this in mind I began end-of-life discussions with the patient.  This point we all agree if she would have something catastrophic happening such as cardiopulmonary arrest she would not want to be resuscitated I have filled out the DNR paperwork regarding this.  However she would like to be more comfortable than what she is now and will admit her to the hospital for hydration and recheck for lactic acid and to come up with a plan perhaps with hospice going forward as she may be amenable to this to help her navigate what is likely to be the last weeks and months of her life.    I contact Dr. Scanlon on-call hospital medicine and her colleagues will admit the patient.    All are agreeable to plan    Problems Addressed:  Elevated glucose: chronic illness or injury  Generalized abdominal pain: complicated acute illness or injury with systemic symptoms that poses a threat to life or bodily functions  Lactic acidosis: complicated acute illness or injury with systemic symptoms that poses a threat to life or bodily functions  Lung nodules: complicated acute illness or injury with systemic symptoms that poses a threat to life or bodily functions  Metastatic colon cancer to liver: complicated acute illness or injury with systemic symptoms that poses a threat to life or bodily functions    Amount and/or Complexity of Data  Reviewed  Independent Historian: caregiver  External Data Reviewed: labs, radiology, ECG and notes.  Labs: ordered. Decision-making details documented in ED Course.  Radiology: ordered and independent interpretation performed. Decision-making details documented in ED Course.  ECG/medicine tests: ordered and independent interpretation performed. Decision-making details documented in ED Course.    Risk  Prescription drug management.  Parenteral controlled substances.  Decision regarding hospitalization.  Decision not to resuscitate or to de-escalate care because of poor prognosis.        Final diagnoses:   Generalized abdominal pain   Lactic acidosis   Metastatic colon cancer to liver   Lung nodules   Elevated glucose       ED Disposition  ED Disposition       ED Disposition   Decision to Admit    Condition   --    Comment   Level of Care: Telemetry [5]   Diagnosis: Acute respiratory failure [518.81.ICD-9-CM]   Admitting Physician: MARISELA WEIR [[id number]]   Attending Physician: MARISELA WEIR [[id number]]                 No follow-up provider specified.       Medication List      No changes were made to your prescriptions during this visit.            Steve Harris MD  04/21/24 1733       Steve Harris MD  04/21/24 1737

## 2024-04-21 NOTE — CONSULTS
Assisted patient with completion of her living will.  Patient has named her daughter, Esperanza Cardoso, as her primary HCS and her granddaughter, Steffanie Cardoso, as her secondary HCS.  If at EOL, patient does not want to have her life extended by mechanical means nor does she want artificial nutrition.  Rather, she wants to die naturally with pain meds for comfort.  I notarized the document, faxed a copy to medical records, placed a copy in her chart and returned the original plus two copies to the patient.

## 2024-04-21 NOTE — ED NOTES
Helena Cardoso    Nursing Report ED to Floor:  Mental status: A&Ox4  Ambulatory status: x1  Oxygen Therapy:  4L, baseline RA  Cardiac Rhythm: SR  Admitted from: ed, home  Safety Concerns:  fall risk  Social Issues: none  ED Room #:  17    ED Nurse Phone Extension - [phone] or may call [phone].      HPI:   Chief Complaint   Patient presents with    Vomiting    Dizziness       Past Medical History:  Past Medical History:   Diagnosis Date    Clostridium difficile infection 07/2022    Colon polyps     Coronary artery disease     Depression     Disease of thyroid gland     Elevated cholesterol     Hiatal hernia     Rectal cancer         Past Surgical History:  Past Surgical History:   Procedure Laterality Date    CARDIAC CATHETERIZATION  1992    COLON RESECTION N/A 7/13/2022    Procedure: ROBOTIC LOW ANTERIOR RESECTION , COLOSTOMY;  Surgeon: Kristi Henderson MD;  Location:  [facility];  Service: Robotics - DaVinci;  Laterality: N/A;    COLONOSCOPY  01/2022    COLOSTOMY N/A 7/15/2022    Procedure: COLOSTOMY REVISION;  Surgeon: Kristi Henderson MD;  Location:  [facility];  Service: General;  Laterality: N/A;    CORONARY ARTERY BYPASS GRAFT  1992    2 VESSEL    CYSTOSCOPY W/ URETERAL STENT PLACEMENT Bilateral 7/13/2022    Procedure: CYSTOSCOPY URETERAL CATHETER/STENT INSERTION;  Surgeon: Kristi Henderson MD;  Location:  [facility];  Service: Robotics - DaVinci;  Laterality: Bilateral;        Admitting Doctor:   Princess Hurst MD    Consulting Provider(s):  Consults       No orders found from 3/23/2024 to 4/22/2024.             Admitting Diagnosis:   The primary encounter diagnosis was Generalized abdominal pain. Diagnoses of Lactic acidosis, Metastatic colon cancer to liver, Lung nodules, and Elevated glucose were also pertinent to this visit.    Most Recent Vitals:   Vitals:    04/21/24 0716 04/21/24 0813 04/21/24 0828 04/21/24 0843   BP: 114/71 108/67 93/63 99/59   BP Location:       Patient Position:       Pulse: 97 82 92 87    Resp:       Temp:       TempSrc:       SpO2: 93% 94% 93% 93%   Weight:       Height:           Active LDAs/IV Access:   Lines, Drains & Airways       Active LDAs       Name Placement date Placement time Site Days    Peripheral IV 04/21/24 0635 Anterior;Distal;Right;Upper Arm 04/21/24  0635  Arm  less than 1    Colostomy LLQ 07/20/22  --  LLQ  641                    Labs (abnormal labs have a star):   Labs Reviewed   COMPREHENSIVE METABOLIC PANEL - Abnormal; Notable for the following components:       Result Value    Glucose 227 (*)     CO2 18.0 (*)     Anion Gap 18.0 (*)     All other components within normal limits    Narrative:     GFR Normal >60  Chronic Kidney Disease <60  Kidney Failure <15    The GFR formula is only valid for adults with stable renal function between ages 18 and 70.   SINGLE HS TROPONIN T - Abnormal; Notable for the following components:    HS Troponin T 69 (*)     All other components within normal limits    Narrative:     High Sensitive Troponin T Reference Range:  <14.0 ng/L- Negative Female for AMI  <22.0 ng/L- Negative Male for AMI  >=14 - Abnormal Female indicating possible myocardial injury.  >=22 - Abnormal Male indicating possible myocardial injury.   Clinicians would have to utilize clinical acumen, EKG, Troponin, and serial changes to determine if it is an Acute Myocardial Infarction or myocardial injury due to an underlying chronic condition.        LACTIC ACID, PLASMA - Abnormal; Notable for the following components:    Lactate 7.0 (*)     All other components within normal limits   CBC WITH AUTO DIFFERENTIAL - Abnormal; Notable for the following components:    MCHC 31.2 (*)     Neutrophil % 90.6 (*)     Lymphocyte % 8.2 (*)     Monocyte % 0.4 (*)     Lymphocytes, Absolute 0.41 (*)     Monocytes, Absolute 0.02 (*)     All other components within normal limits   COVID-19 AND FLU A/B, NP SWAB IN TRANSPORT MEDIA 1 HR TAT - Normal    Narrative:     Fact sheet for providers:  "https://www.fda.gov/media/995710/download    Fact sheet for patients: https://www.fda.gov/media/576793/download    Test performed by PCR.   BNP (IN-HOUSE) - Normal    Narrative:     This assay is used as an aid in the diagnosis of individuals suspected of having heart failure. It can be used as an aid in the diagnosis of acute decompensated heart failure (ADHF) in patients presenting with signs and symptoms of ADHF to the emergency department (ED). In addition, NT-proBNP of <300 pg/mL indicates ADHF is not likely.    Age Range Result Interpretation  NT-proBNP Concentration (pg/mL:      <50             Positive            >450                   Gray                 300-450                    Negative             <300    50-75           Positive            >900                  Gray                300-900                  Negative            <300      >75             Positive            >1800                  Gray                300-1800                  Negative            <300   PROCALCITONIN - Normal    Narrative:     As a Marker for Sepsis (Non-Neonates):    1. <0.5 ng/mL represents a low risk of severe sepsis and/or septic shock.  2. >2 ng/mL represents a high risk of severe sepsis and/or septic shock.    As a Marker for Lower Respiratory Tract Infections that require antibiotic therapy:    PCT on Admission    Antibiotic Therapy       6-12 Hrs later    >0.5                Strongly Recommended  >0.25 - <0.5        Recommended   0.1 - 0.25          Discouraged              Remeasure/reassess PCT  <0.1                Strongly Discouraged     Remeasure/reassess PCT    As 28 day mortality risk marker: \"Change in Procalcitonin Result\" (>80% or <=80%) if Day 0 (or Day 1) and Day 4 values are available. Refer to http://www.[employer]-pct-calculator.com    Change in PCT <=80%  A decrease of PCT levels below or equal to 80% defines a positive change in PCT test result representing a higher risk for 28-day all-cause mortality " of patients diagnosed with severe sepsis for septic shock.    Change in PCT >80%  A decrease of PCT levels of more than 80% defines a negative change in PCT result representing a lower risk for 28-day all-cause mortality of patients diagnosed with severe sepsis or septic shock.      BLOOD CULTURE   BLOOD CULTURE   RAINBOW DRAW    Narrative:     The following orders were created for panel order [place] Draw.  Procedure                               Abnormality         Status                     ---------                               -----------         ------                     Green Top (Gel)[[id number]]                                  Final result               Lavender Top[[id number]]                                     Final result               Gold Top - SST[[id number]]                                   Final result               [place] Top[[id number]]                                         In process                 Light Blue Top[[id number]]                                   Final result                 Please view results for these tests on the individual orders.   URINALYSIS W/ CULTURE IF INDICATED   LACTIC ACID, REFLEX   HIGH SENSITIVITIY TROPONIN T 2HR   GREEN TOP   LAVENDER TOP   GOLD TOP - SST   LIGHT BLUE TOP   CBC AND DIFFERENTIAL    Narrative:     The following orders were created for panel order CBC & Differential.  Procedure                               Abnormality         Status                     ---------                               -----------         ------                     CBC Auto Differential[[id number]]        Abnormal            Final result                 Please view results for these tests on the individual orders.   GRAY TOP       Meds Given in ED:   Medications   sodium chloride 0.9 % infusion (has no administration in time range)   sodium chloride 0.9 % bolus 1,938 mL (has no administration in time range)   sodium chloride 0.9 % bolus 500 mL (500 mL Intravenous New Bag 4/21/24  7585)   HYDROmorphone (DILAUDID) injection 0.25 mg (0.25 mg Intravenous Given 4/21/24 0726)   ondansetron (ZOFRAN) injection 4 mg (4 mg Intravenous Given 4/21/24 0726)   iopamidol (ISOVUE-370) 76 % injection 100 mL (85 mL Intravenous Given 4/21/24 1443)     sodium chloride, 125 mL/hr         Last NIH score:                                                          Dysphagia screening results:  Patient Factors Component (Dysphagia:Stroke or Rule-out)  Best Eye Response: 4-->(E4) spontaneous (04/21/24 0747)  Best Motor Response: 6-->(M6) obeys commands (04/21/24 0747)  Best Verbal Response: 5-->(V5) oriented (04/21/24 0747)  [place] Coma Scale Score: 15 (04/21/24 0747)     [place] Coma Scale:  No data recorded     CIWA:        Restraint Type:            Isolation Status:  No active isolations

## 2024-04-21 NOTE — H&P
[facility] Medicine Services  HISTORY AND PHYSICAL    Patient Name: Helena Cardoso  : 1945  MRN: [id number]  Primary Care Physician: Jay Pelaez DO  Date of admission: 2024      Subjective   Subjective     Chief Complaint:  Weakness    HPI:  Helena Cardoso is a 78 y.o. female with history of rectal cancer status post resection/colostomy (2022, Dr. Brito), right ureteral stenosis status post stent (follows with Dr. Vazquez at , placed on ). On 2024 she was seen by her primary care physician as a hospital follow-up.  She was complaining of urgency, frequency and hematuria at that time per office note.  UA was obtained.  She was prescribed an antibiotic at some point but did not take them due to the side effects.  Over the past 24 hours she had worsening flank pain with nausea, chills and subjective fever.  She became weaker and called EMS and presented to the ED.  In the ED, she was noted to be hypoxic and required supplemental oxygen.  Spinal medicine was called for admission.      Personal History     Past Medical History:   Diagnosis Date    Clostridium difficile infection 2022    Colon polyps     Coronary artery disease     Depression     Disease of thyroid gland     Elevated cholesterol     Hiatal hernia     Rectal cancer          Oncology Problem List:  Rectal cancer (2022; Status: Active)    Oncology/Hematology History     Past Surgical History:   Procedure Laterality Date    CARDIAC CATHETERIZATION      COLON RESECTION N/A 2022    Procedure: ROBOTIC LOW ANTERIOR RESECTION , COLOSTOMY;  Surgeon: Kristi Henderson MD;  Location: [facility] OR;  Service: Robotics - DaVinci;  Laterality: N/A;    COLONOSCOPY  2022    COLOSTOMY N/A 7/15/2022    Procedure: COLOSTOMY REVISION;  Surgeon: Kristi Henderson MD;  Location: [facility] OR;  Service: General;  Laterality: N/A;    CORONARY ARTERY BYPASS GRAFT      2 VESSEL    CYSTOSCOPY W/ URETERAL  STENT PLACEMENT Bilateral 7/13/2022    Procedure: CYSTOSCOPY URETERAL CATHETER/STENT INSERTION;  Surgeon: Kristi Henderson MD;  Location: [facility];  Service: Robotics - DaVinci;  Laterality: Bilateral;       Family History: family history includes Colon cancer in her sister.     Social History:  reports that she quit smoking about 56 years ago. Her smoking use included cigarettes. She has never used smokeless tobacco. She reports that she does not currently use alcohol after a past usage of about 1.0 - 2.0 standard drink of alcohol per week. She reports that she does not use drugs.  Social History     Social History Narrative    Not on file       Medications:  Available home medication information reviewed.  CBD, Probiotic Product, Turmeric, Zinc, coenzyme Q10, levothyroxine, magnesium chloride ER, and sertraline    Allergies   Allergen Reactions    Tetracycline Rash       Objective   Objective     Vital Signs:   Temp:  [98 °F (36.7 °C)-99.7 °F (37.6 °C)] 98 °F (36.7 °C)  Heart Rate:  [] 89  Resp:  [20] 20  BP: ()/(59-76) 91/65  Flow (L/min):  [4-5] 5       Physical Exam   Constitutional: No acute distress, awake, alert  Respiratory: Clear to auscultation bilaterally, respiratory effort normal on 3 L  Cardiovascular: RRR  Gastrointestinal: Positive bowel sounds, soft, nontender, nondistended  Musculoskeletal: No bilateral ankle edema  Psychiatric: Appropriate affect, cooperative  Neurologic: Oriented x 3, no focal neurological deficits  Skin: No rashes      Result Review:  I have personally reviewed the results from the time of this admission to 4/21/2024 12:39 EDT and agree with these findings:  [x]  Laboratory list / accordion  [x]  Microbiology  [x]  Radiology  []  EKG/Telemetry   []  Cardiology/Vascular   []  Pathology  [x]  Old records  []  Other:      LAB RESULTS:      Lab 04/21/24  0654   WBC 5.00   HEMOGLOBIN 13.1   HEMATOCRIT 42.0   PLATELETS 262   NEUTROS ABS 4.53   IMMATURE GRANS (ABS) 0.00    LYMPHS ABS 0.41*   MONOS ABS 0.02*   EOS ABS 0.02   MCV 91.3   PROCALCITONIN 0.15   LACTATE 7.0*         Lab 04/21/24  0654   SODIUM 138   POTASSIUM 4.1   CHLORIDE 102   CO2 18.0*   ANION GAP 18.0*   BUN 19   CREATININE 0.81   EGFR 73.9   GLUCOSE 227*   CALCIUM 8.8         Lab 04/21/24  0654   TOTAL PROTEIN 6.4   ALBUMIN 3.6   GLOBULIN 2.8   ALT (SGPT) 6   AST (SGOT) 12   BILIRUBIN 0.6   ALK PHOS 79         Lab 04/21/24  0654   PROBNP 131.5   HSTROP T 69*                 UA          10/30/2023    12:36 2/25/2024    14:08 3/7/2024    12:19   Urinalysis   Squamous Epithelial Cells, UA 0-2     0-2     3-5       Specific Gravity, UA  1.022     >=1.030       Blood, UA  Negative     Small       Leukocytes, UA  Small     Moderate       RBC, UA <1     <1     <1       Bacteria, UA Negative     Present     Present          Details          This result is from an external source.               Microbiology Results (last 10 days)       Procedure Component Value - Date/Time    COVID-19 and FLU A/B PCR, 1 HR TAT - Swab, Nasopharynx [[id number]]  (Normal) Collected: 04/21/24 0744    Lab Status: Final result Specimen: Swab from Nasopharynx Updated: 04/21/24 0814     COVID19 Not Detected     Influenza A PCR Not Detected     Influenza B PCR Not Detected    Narrative:      Fact sheet for providers: https://www.fda.gov/media/809485/download    Fact sheet for patients: https://www.fda.gov/media/767209/download    Test performed by PCR.            CT Angiogram Chest    Result Date: 4/21/2024  CT ABDOMEN PELVIS W CONTRAST, CT ANGIOGRAM CHEST Date of Exam: 4/21/2024 7:37 AM EDT Indication: s/p renal stent placement eval for renal abcess. hypoxia, recent surgery, eval for pe Comparison: CT chest abdomen pelvis 2/1/2022. Technique: Axial CT images were obtained of the chest, abdomen, and pelvis following the uneventful intravenous administration of 85 mL Isovue-370. Reconstructed coronal and sagittal images were also obtained. Automated  exposure control and iterative construction methods were used. Findings: CHEST: Cardiovascular: No pericardial effusion. Normal caliber thoracic aorta. Dilatation of the main pulmonary artery measuring 3.5 cm, which can be seen with pulmonary hypertension. Coronary artery calcifications. Heart appears enlarged. Lymph nodes and mediastinum: Mildly enlarged bilateral axillary lymph nodes. Prominent but not pathologically enlarged bilateral hilar and mediastinal lymph nodes. Lungs: Numerous bilateral noncalcified pulmonary nodules, new from prior exam. For example, there is a 2.6 x 1.7 left lower lobe nodule (axial image 70) and a 1.9 x 1.2 cm right lower lobe nodule (series 4 image 61). Central airways are patent. No evidence of superimposed pneumonia. Pleura: No pleural effusion or pneumothorax. Bones and soft tissues: No acute or suspicious osseous abnormality. Sternotomy. Multilevel degenerative changes of the thoracic spine. Soft tissues are within normal limits. ABDOMEN and PELVIS: Liver: Multiple solid appearing liver lesions, increased in size and number from prior exam. For example, a lesion in the left hepatic lobe measures 6.0 x 3.7 cm (axial image 19) and a lesion in the right hepatic lobe measures 3.9 x 2.9 cm (axial image 38). Gallbladder and bile ducts: Gallbladder is unremarkable. No biliary ductal dilatation. Pancreas: No pancreatic duct dilation. No surrounding inflammation. Spleen: Normal in size. Small adjacent splenule. Adrenal glands: No discrete adrenal nodule. Kidneys and ureters: Moderate right-sided hydroureteronephrosis with urothelial thickening and slightly delayed nephrogram, with nephroureteral stent in place. No evidence of renal abscess. No left-sided hydronephrosis. Urinary bladder: Unremarkable. Reproductive Organs: Unremarkable. Stomach and Bowel: Postsurgical changes of low anterior resection and colostomy. No evidence of bowel obstruction. Hiatal hernia. Lymph nodes: No  pathologically enlarged lymph nodes. Vasculature: No aneurysmal dilation of the abdominal aorta. Atherosclerosis. Peritoneum and retroperitoneum: No free air or free fluid. Bones and soft tissues: No acute or suspicious osseous abnormality. Multilevel degenerative changes of the lumbar spine. Fat-containing left inguinal hernia. Left lower quadrant colostomy, as above, with parastomal hernia.     Impression: Impression: Chest: - Numerous new bilateral pulmonary nodules, likely metastatic disease. - Mildly enlarged bilateral axillary lymph nodes as well as prominent bilateral hilar and mediastinal lymph nodes. Findings are nonspecific and may represent reactive lymphadenopathy and/or rimma metastatic disease. - No evidence of pulmonary embolism. Abdomen and pelvis: - Increased size and number of hepatic metastatic disease. - Right-sided nephroureteral stent in place with findings of obstruction including moderate hydroureteronephrosis and slightly delayed nephrogram. There is diffuse urothelial enhancement without evidence of renal abscess, as questioned. - Postsurgical changes of low anterior resection and left lower quadrant colostomy with parastomal hernia. Electronically Signed: Kamron Simons MD  4/21/2024 8:23 AM EDT  Workstation ID: [id number]    CT Abdomen Pelvis With Contrast    Result Date: 4/21/2024  CT ABDOMEN PELVIS W CONTRAST, CT ANGIOGRAM CHEST Date of Exam: 4/21/2024 7:37 AM EDT Indication: s/p renal stent placement eval for renal abcess. hypoxia, recent surgery, eval for pe Comparison: CT chest abdomen pelvis 2/1/2022. Technique: Axial CT images were obtained of the chest, abdomen, and pelvis following the uneventful intravenous administration of 85 mL Isovue-370. Reconstructed coronal and sagittal images were also obtained. Automated exposure control and iterative construction methods were used. Findings: CHEST: Cardiovascular: No pericardial effusion. Normal caliber thoracic aorta. Dilatation of the  main pulmonary artery measuring 3.5 cm, which can be seen with pulmonary hypertension. Coronary artery calcifications. Heart appears enlarged. Lymph nodes and mediastinum: Mildly enlarged bilateral axillary lymph nodes. Prominent but not pathologically enlarged bilateral hilar and mediastinal lymph nodes. Lungs: Numerous bilateral noncalcified pulmonary nodules, new from prior exam. For example, there is a 2.6 x 1.7 left lower lobe nodule (axial image 70) and a 1.9 x 1.2 cm right lower lobe nodule (series 4 image 61). Central airways are patent. No evidence of superimposed pneumonia. Pleura: No pleural effusion or pneumothorax. Bones and soft tissues: No acute or suspicious osseous abnormality. Sternotomy. Multilevel degenerative changes of the thoracic spine. Soft tissues are within normal limits. ABDOMEN and PELVIS: Liver: Multiple solid appearing liver lesions, increased in size and number from prior exam. For example, a lesion in the left hepatic lobe measures 6.0 x 3.7 cm (axial image 19) and a lesion in the right hepatic lobe measures 3.9 x 2.9 cm (axial image 38). Gallbladder and bile ducts: Gallbladder is unremarkable. No biliary ductal dilatation. Pancreas: No pancreatic duct dilation. No surrounding inflammation. Spleen: Normal in size. Small adjacent splenule. Adrenal glands: No discrete adrenal nodule. Kidneys and ureters: Moderate right-sided hydroureteronephrosis with urothelial thickening and slightly delayed nephrogram, with nephroureteral stent in place. No evidence of renal abscess. No left-sided hydronephrosis. Urinary bladder: Unremarkable. Reproductive Organs: Unremarkable. Stomach and Bowel: Postsurgical changes of low anterior resection and colostomy. No evidence of bowel obstruction. Hiatal hernia. Lymph nodes: No pathologically enlarged lymph nodes. Vasculature: No aneurysmal dilation of the abdominal aorta. Atherosclerosis. Peritoneum and retroperitoneum: No free air or free fluid. Bones  and soft tissues: No acute or suspicious osseous abnormality. Multilevel degenerative changes of the lumbar spine. Fat-containing left inguinal hernia. Left lower quadrant colostomy, as above, with parastomal hernia.     Impression: Impression: Chest: - Numerous new bilateral pulmonary nodules, likely metastatic disease. - Mildly enlarged bilateral axillary lymph nodes as well as prominent bilateral hilar and mediastinal lymph nodes. Findings are nonspecific and may represent reactive lymphadenopathy and/or rimma metastatic disease. - No evidence of pulmonary embolism. Abdomen and pelvis: - Increased size and number of hepatic metastatic disease. - Right-sided nephroureteral stent in place with findings of obstruction including moderate hydroureteronephrosis and slightly delayed nephrogram. There is diffuse urothelial enhancement without evidence of renal abscess, as questioned. - Postsurgical changes of low anterior resection and left lower quadrant colostomy with parastomal hernia. Electronically Signed: Kamron Simons MD  4/21/2024 8:23 AM EDT  Workstation ID: [id number]         Assessment & Plan   Assessment & Plan       Acute respiratory failure    Helena Cardoso is a 78 y.o. female with history of rectal cancer status post resection/colostomy (July 2022, Dr. Brito), right ureteral stenosis status post stent (follows with Dr. aVzquez at , placed on March 8) with weakness and pain.    Acute hypoxic respiratory failure  -Requiring 5 L.  Suspect secondary to lung mets.  -Will likely require oxygen on discharge    Metastatic colon cancer  -History of colon cancer in July 22 with resection and ostomy placement.  Declined treatment for this at that time  -CT chest on presentation shows numerous bilateral pulmonary nodules and axillary, hilar and mediastinal lymphadenopathy.  Negative for PE  -CT abdomen pelvis shows hepatic mets, right ureteral stent with findings of obstruction, moderate hydronephrosis  -Start IV and  p.o. pain medications  -Discussed with patient and family, agreeable to palliative care consult but do not feel she is ready for hospice. Currently DNR/DNI.    Possible UTI in the setting of ureteral stent  Ureteral obstruction the hydronephrosis  -Note on CT scan, renal function within normal limits  -Currently without leukocytosis, normal procalcitonin.  Does have low-grade fevers.  -Need to consider antibiotics, was prescribed 1 to 2 weeks ago.  Unable to see culture results from [facility].  -Discussed with daughter, do not want any invasive procedures unless absolutely necessary.  Will do fear urology consult.  Creatinine within normal limits  -Patient would not want antibiotics if she has a urinary tract infection.    -Probiotics    Lactic acidosis  -Lactate 7 on presentation.  -Will give 1L LR bolus and then continue IVFs.  -Reflex pending    Elevated troponin  -Suspect demand ischemia secondary to respiratory failure  -Repeat pending    History of C. difficile   -Need to be cautious with antibiotics and likely use prophylactic vancomycin    Family would like to minimize hospital stay. Recommended fluids, pain control and palliative today. Possibly could be discharge home tomorrow.     Addendum 4:21pm:  Acute MI:  -Troponin trended up from .  Went to reevaluate patient, not complaining of any chest pain.  EKG was obtained which showed some T wave inversions but no ST elevation or depression.  -Long discussion with patient and friend at the bedside.  Friend is an anesthesiologist and helped patient understand the situation.  Noted that normally would start blood thinners and get cardiology involved plus or minus a left heart cath.  Given prior conversations with the patient she did not want any invasive measures.  Decided to pursue comfort measures and avoid any unnecessary interventions or medications.  Will defer starting heparin, Plavix, aspirin is within goals of care. Lactate trended  down    Hypotension  -Has been hypotensive this morning with systolics in the 80s and MAP of 65.  She has received 1.5 L fluid without improvement.  We discussed not focusing on the numbers and focusing on medications to maintain comfort which could possibly reduce her blood pressures.  Currently not having any symptoms.  Will go ahead and start midodrine 3 times daily.  Would like to avoid additional fluids given the fact that she may have some left ventricular dysfunction from her cardiac event.    I visited discussions regarding hospice given the new findings.  Overall the goal is to get her home and have symptoms controlled.  She is hesitant regarding hospice due to her prior bad experience with her daughter.  I discussed the benefits of hospice in order to keeping her out of the hospital and also managing her symptoms.  Would benefit from IV Lasix at home if it is possible if she needs it later on.  Patient agreeable to talk to hospice tomorrow once her family gets here from [place].  Will place a hospice consult for tomorrow.    Patient is of sound mind and able to maker her own decisions.       DVT prophylaxis:  Medical DVT prophylaxis orders are present.          CODE STATUS:    Code Status and Medical Interventions:   Ordered at: 04/21/24 0846     Medical Intervention Limits:    NO intubation (DNI)    NO antiarrhythmic drugs    NO artificial nutrition    NO dialysis    NO cardioversion    NO vasopressors     Code Status (Patient has no pulse and is not breathing):    No CPR (Do Not Attempt to Resuscitate)     Medical Interventions (Patient has pulse or is breathing):    Limited Support       Expected Discharge   Expected Discharge Date: 4/22/2024; Expected Discharge Time:      Princess Hurst MD  04/21/24    Total time spent: 90  Time spent includes time reviewing chart, face-to-face time, counseling patient/family/caregiver, ordering medications/tests/procedures, communicating with other health  care professionals, documenting clinical information in the electronic health record, and coordination of care.

## 2024-04-21 NOTE — CONSULTS
Jay Pelaez DO  Consulting physician: Princess Hurst    Chief Complaint   Patient presents with    Vomiting    Dizziness       Reason for consult: [facility]    HPI: Patient is a 78-year female brought hospital due to vomiting, dizziness, and just overall decline.  Patient has a history of colon cancer which she was apparently diagnosed with about 2 years ago.  Patient has declined any treatment for her colon cancer.  Recently of note the patient is also been diagnosed with urinary tract infection and apparently has declined any treatment for urinary tract infection.  Patient brought hospital again with dizziness and just overall debility and weakness.  Patient has been found to be hypotensive and admitted to telemetry and started on IV fluids.  Patient states that she is feeling somewhat better since being admitted to the hospital and started on IV fluids.  At home the patient states that she has been doing fairly well able to take care of all of her own ADLs with no significant issues.    Pain assesment: Positive, back, mild to moderate in nature, intermittent, aching in nature.    Dyspnea: denies    N/V: denies    PPS: 40       Past Medical History:   Diagnosis Date    Clostridium difficile infection 07/2022    Colon polyps     Coronary artery disease     Depression     Disease of thyroid gland     Elevated cholesterol     Hiatal hernia     Rectal cancer      Past Surgical History:   Procedure Laterality Date    CARDIAC CATHETERIZATION  1992    COLON RESECTION N/A 7/13/2022    Procedure: ROBOTIC LOW ANTERIOR RESECTION , COLOSTOMY;  Surgeon: Kristi Henderson MD;  Location: [facility] OR;  Service: Robotics - [facility];  Laterality: N/A;    COLONOSCOPY  01/2022    COLOSTOMY N/A 7/15/2022    Procedure: COLOSTOMY REVISION;  Surgeon: Kristi Henderson MD;  Location: [facility] OR;  Service: General;  Laterality: N/A;    CORONARY ARTERY BYPASS GRAFT  1992    2 VESSEL    CYSTOSCOPY W/ URETERAL STENT PLACEMENT Bilateral 7/13/2022     Procedure: CYSTOSCOPY URETERAL CATHETER/STENT INSERTION;  Surgeon: Kristi Henderson MD;  Location: [facility];  Service: Robotics - [facility];  Laterality: Bilateral;     Current Code Status       Date Active Code Status Order ID Comments User Context       4/21/2024 0846 No CPR (Do Not Attempt to Resuscitate) [id number]  Steve Harris MD ED        Question Answer    Code Status (Patient has no pulse and is not breathing) No CPR (Do Not Attempt to Resuscitate)    Medical Interventions (Patient has pulse or is breathing) Limited Support    Medical Intervention Limits: NO intubation (DNI)     NO antiarrhythmic drugs     NO artificial nutrition     NO dialysis     NO cardioversion     NO vasopressors                  Current Facility-Administered Medications   Medication Dose Route Frequency Provider Last Rate Last Admin    sennosides-docusate (PERICOLACE) 8.6-50 MG per tablet 2 tablet  2 tablet Oral BID PRN Princess Hurst MD        And    polyethylene glycol (MIRALAX) packet 17 g  17 g Oral Daily PRN Princess Hurst MD        And    bisacodyl (DULCOLAX) EC tablet 5 mg  5 mg Oral Daily PRN Princess Hurst MD        And    bisacodyl (DULCOLAX) suppository 10 mg  10 mg Rectal Daily PRN Princess Hurst MD        Calcium Replacement - Follow Nurse / BPA Driven Protocol   Does not apply PRN Princess Hurst MD        Enoxaparin Sodium (LOVENOX) syringe 40 mg  40 mg Subcutaneous Daily Princess Hurst MD        HYDROmorphone (DILAUDID) injection 0.25 mg  0.25 mg Intravenous Q2H PRN Princess Hurst MD        ketorolac (TORADOL) injection 15 mg  15 mg Intravenous Q6H PRN Princess Hurst MD   15 mg at 04/21/24 1210    lactobacillus acidophilus (RISAQUAD) capsule 1 capsule  1 capsule Oral Daily Princess Hurst MD        levothyroxine (SYNTHROID, LEVOTHROID) tablet 88 mcg  88 mcg Oral Q AM Princess Hurst MD        Magnesium Standard Dose Replacement - Follow Nurse / BPA Driven Protocol   Does not  apply Princess Nichole MD        ondansetron ODT (ZOFRAN-ODT) disintegrating tablet 4 mg  4 mg Oral Q6H PRN Princess Hurst MD        Or    ondansetron (ZOFRAN) injection 4 mg  4 mg Intravenous Q6H PRN Princess Hurst MD   4 mg at 24 1210    oxyCODONE (ROXICODONE) immediate release tablet 5 mg  5 mg Oral Q4H PRN Princess Hurst MD        Phosphorus Replacement - Follow Nurse / BPA Driven Protocol   Does not apply PRN Princess Hurst MD        Potassium Replacement - Follow Nurse / BPA Driven Protocol   Does not apply Princess Nichole MD        sertraline (ZOLOFT) tablet 25 mg  25 mg Oral Daily Princess Hurst MD        sodium chloride 0.9 % flush 10 mL  10 mL Intravenous Q12H Princess Hurst MD        sodium chloride 0.9 % flush 10 mL  10 mL Intravenous PRN Princess Hurst MD        sodium chloride 0.9 % infusion 40 mL  40 mL Intravenous PRN Princess Hurst MD        sodium chloride 0.9 % infusion  125 mL/hr Intravenous Continuous Princess Hurst  mL/hr at 24 1043 125 mL/hr at 24 1043     sodium chloride, 125 mL/hr, Last Rate: 125 mL/hr (24 1043)        senna-docusate sodium **AND** polyethylene glycol **AND** bisacodyl **AND** bisacodyl    Calcium Replacement - Follow Nurse / BPA Driven Protocol    HYDROmorphone    ketorolac    Magnesium Standard Dose Replacement - Follow Nurse / BPA Driven Protocol    ondansetron ODT **OR** ondansetron    oxyCODONE    Phosphorus Replacement - Follow Nurse / BPA Driven Protocol    Potassium Replacement - Follow Nurse / BPA Driven Protocol    sodium chloride    sodium chloride  Allergies   Allergen Reactions    Tetracycline Rash     Family History   Problem Relation Age of Onset    Colon cancer Sister      Social History     Socioeconomic History    Marital status:    Tobacco Use    Smoking status: Former     Current packs/day: 0.00     Types: Cigarettes     Quit date:      Years since quittin.3     "Smokeless tobacco: Never   Vaping Use    Vaping status: Never Used   Substance and Sexual Activity    Alcohol use: Not Currently     Alcohol/week: 1.0 - 2.0 standard drink of alcohol     Types: 1 - 2 Glasses of wine per week    Drug use: Never    Sexual activity: Defer     Review of Systems -all other reviewed and found negative septa mentioned in the HPI      BP 91/65   Pulse 89   Temp 98 °F (36.7 °C) (Oral)   Resp 20   Ht 154.9 cm (60.98\")   Wt 64.6 kg (142 lb 6.7 oz)   SpO2 94%   BMI 26.92 kg/m²     Intake/Output Summary (Last 24 hours) at 4/21/2024 1316  Last data filed at 4/21/2024 1300  Gross per 24 hour   Intake 600 ml   Output --   Net 600 ml     Physical Exam:      General Appearance:  Alert, cooperative, in no acute distress   Head:  Normocephalic, without obvious abnormality, atraumatic   Eyes:  Lids and lashes normal, conjunctivae and sclerae normal, no icterus, no pallor, corneas clear, PERRLA   Ears:  Ears appear intact with no abnormalities noted   Throat:  No oral lesions, no thrush, oral mucosa moist   Neck:  No adenopathy, supple, trachea midline, no thyromegaly, no carotid bruit, no JVD   Back:  No kyphosis present, no scoliosis present, no skin lesions, erythema or scars, no tenderness to percussion, or palpation, range of motion normal   Lungs:  Clear to auscultation,respirations regular, even and unlabored    Heart:  Regular rhythm and normal rate, normal S1 and S2, no murmur, no gallop, no rub, no click   Breast Exam:  Deferred   Abdomen:  Normal bowel sounds, no masses, no organomegaly, soft non-tender, non-distended, no guarding, no rebound tenderness   Genitalia:  Deferred   Extremities:  Moves all extremities well, no edema, no cyanosis, no redness   Pulses:  Pulses palpable and equal bilaterally   Skin:  No bleeding, bruising or rash   Lymph nodes:  No palpable adenopathy   Neurologic:  Cranial nerves 2 - 12 grossly intact, sensation intact, DTR present and equal bilaterally "       Results from last 7 days   Lab Units 04/21/24  0654   WBC 10*3/mm3 5.00   HEMOGLOBIN g/dL 13.1   HEMATOCRIT % 42.0   PLATELETS 10*3/mm3 262     Results from last 7 days   Lab Units 04/21/24  0654   SODIUM mmol/L 138   POTASSIUM mmol/L 4.1   CHLORIDE mmol/L 102   CO2 mmol/L 18.0*   BUN mg/dL 19   CREATININE mg/dL 0.81   CALCIUM mg/dL 8.8   BILIRUBIN mg/dL 0.6   ALK PHOS U/L 79   ALT (SGPT) U/L 6   AST (SGOT) U/L 12   GLUCOSE mg/dL 227*     Results from last 7 days   Lab Units 04/21/24  0654   SODIUM mmol/L 138   POTASSIUM mmol/L 4.1   CHLORIDE mmol/L 102   CO2 mmol/L 18.0*   BUN mg/dL 19   CREATININE mg/dL 0.81   GLUCOSE mg/dL 227*   CALCIUM mg/dL 8.8     Imaging Results (Last 72 Hours)       Procedure Component Value Units Date/Time    CT Angiogram Chest [[id number]] Collected: 04/21/24 0801     Updated: 04/21/24 0826    Narrative:      CT ABDOMEN PELVIS W CONTRAST, CT ANGIOGRAM CHEST    Date of Exam: 4/21/2024 7:37 AM EDT    Indication: s/p renal stent placement eval for renal abcess. hypoxia, recent surgery, eval for pe    Comparison: CT chest abdomen pelvis 2/1/2022.    Technique: Axial CT images were obtained of the chest, abdomen, and pelvis following the uneventful intravenous administration of 85 mL Isovue-370. Reconstructed coronal and sagittal images were also obtained. Automated exposure control and iterative   construction methods were used.    Findings:    CHEST:    Cardiovascular: No pericardial effusion. Normal caliber thoracic aorta. Dilatation of the main pulmonary artery measuring 3.5 cm, which can be seen with pulmonary hypertension. Coronary artery calcifications. Heart appears enlarged.    Lymph nodes and mediastinum: Mildly enlarged bilateral axillary lymph nodes. Prominent but not pathologically enlarged bilateral hilar and mediastinal lymph nodes.    Lungs: Numerous bilateral noncalcified pulmonary nodules, new from prior exam. For example, there is a 2.6 x 1.7 left lower lobe nodule  (axial image 70) and a 1.9 x 1.2 cm right lower lobe nodule (series 4 image 61). Central airways are patent. No   evidence of superimposed pneumonia.    Pleura: No pleural effusion or pneumothorax.    Bones and soft tissues: No acute or suspicious osseous abnormality. Sternotomy. Multilevel degenerative changes of the thoracic spine. Soft tissues are within normal limits.    ABDOMEN and PELVIS:    Liver: Multiple solid appearing liver lesions, increased in size and number from prior exam. For example, a lesion in the left hepatic lobe measures 6.0 x 3.7 cm (axial image 19) and a lesion in the right hepatic lobe measures 3.9 x 2.9 cm (axial image   38).    Gallbladder and bile ducts: Gallbladder is unremarkable. No biliary ductal dilatation.    Pancreas: No pancreatic duct dilation. No surrounding inflammation.    Spleen: Normal in size. Small adjacent splenule.    Adrenal glands: No discrete adrenal nodule.    Kidneys and ureters: Moderate right-sided hydroureteronephrosis with urothelial thickening and slightly delayed nephrogram, with nephroureteral stent in place. No evidence of renal abscess. No left-sided hydronephrosis.    Urinary bladder: Unremarkable.    Reproductive Organs: Unremarkable.    Stomach and Bowel: Postsurgical changes of low anterior resection and colostomy. No evidence of bowel obstruction. Hiatal hernia.    Lymph nodes: No pathologically enlarged lymph nodes.    Vasculature: No aneurysmal dilation of the abdominal aorta. Atherosclerosis.    Peritoneum and retroperitoneum: No free air or free fluid.    Bones and soft tissues: No acute or suspicious osseous abnormality. Multilevel degenerative changes of the lumbar spine. Fat-containing left inguinal hernia. Left lower quadrant colostomy, as above, with parastomal hernia.      Impression:      Impression:    Chest:  - Numerous new bilateral pulmonary nodules, likely metastatic disease.  - Mildly enlarged bilateral axillary lymph nodes as well as  prominent bilateral hilar and mediastinal lymph nodes. Findings are nonspecific and may represent reactive lymphadenopathy and/or rimma metastatic disease.  - No evidence of pulmonary embolism.    Abdomen and pelvis:  - Increased size and number of hepatic metastatic disease.  - Right-sided nephroureteral stent in place with findings of obstruction including moderate hydroureteronephrosis and slightly delayed nephrogram. There is diffuse urothelial enhancement without evidence of renal abscess, as questioned.   - Postsurgical changes of low anterior resection and left lower quadrant colostomy with parastomal hernia.      Electronically Signed: Kamron Simons MD    4/21/2024 8:23 AM EDT    Workstation ID: [id number]    CT Abdomen Pelvis With Contrast [[id number]] Collected: 04/21/24 0801     Updated: 04/21/24 0826    Narrative:      CT ABDOMEN PELVIS W CONTRAST, CT ANGIOGRAM CHEST    Date of Exam: 4/21/2024 7:37 AM EDT    Indication: s/p renal stent placement eval for renal abcess. hypoxia, recent surgery, eval for pe    Comparison: CT chest abdomen pelvis 2/1/2022.    Technique: Axial CT images were obtained of the chest, abdomen, and pelvis following the uneventful intravenous administration of 85 mL Isovue-370. Reconstructed coronal and sagittal images were also obtained. Automated exposure control and iterative   construction methods were used.    Findings:    CHEST:    Cardiovascular: No pericardial effusion. Normal caliber thoracic aorta. Dilatation of the main pulmonary artery measuring 3.5 cm, which can be seen with pulmonary hypertension. Coronary artery calcifications. Heart appears enlarged.    Lymph nodes and mediastinum: Mildly enlarged bilateral axillary lymph nodes. Prominent but not pathologically enlarged bilateral hilar and mediastinal lymph nodes.    Lungs: Numerous bilateral noncalcified pulmonary nodules, new from prior exam. For example, there is a 2.6 x 1.7 left lower lobe nodule (axial image 70)  and a 1.9 x 1.2 cm right lower lobe nodule (series 4 image 61). Central airways are patent. No   evidence of superimposed pneumonia.    Pleura: No pleural effusion or pneumothorax.    Bones and soft tissues: No acute or suspicious osseous abnormality. Sternotomy. Multilevel degenerative changes of the thoracic spine. Soft tissues are within normal limits.    ABDOMEN and PELVIS:    Liver: Multiple solid appearing liver lesions, increased in size and number from prior exam. For example, a lesion in the left hepatic lobe measures 6.0 x 3.7 cm (axial image 19) and a lesion in the right hepatic lobe measures 3.9 x 2.9 cm (axial image   38).    Gallbladder and bile ducts: Gallbladder is unremarkable. No biliary ductal dilatation.    Pancreas: No pancreatic duct dilation. No surrounding inflammation.    Spleen: Normal in size. Small adjacent splenule.    Adrenal glands: No discrete adrenal nodule.    Kidneys and ureters: Moderate right-sided hydroureteronephrosis with urothelial thickening and slightly delayed nephrogram, with nephroureteral stent in place. No evidence of renal abscess. No left-sided hydronephrosis.    Urinary bladder: Unremarkable.    Reproductive Organs: Unremarkable.    Stomach and Bowel: Postsurgical changes of low anterior resection and colostomy. No evidence of bowel obstruction. Hiatal hernia.    Lymph nodes: No pathologically enlarged lymph nodes.    Vasculature: No aneurysmal dilation of the abdominal aorta. Atherosclerosis.    Peritoneum and retroperitoneum: No free air or free fluid.    Bones and soft tissues: No acute or suspicious osseous abnormality. Multilevel degenerative changes of the lumbar spine. Fat-containing left inguinal hernia. Left lower quadrant colostomy, as above, with parastomal hernia.      Impression:      Impression:    Chest:  - Numerous new bilateral pulmonary nodules, likely metastatic disease.  - Mildly enlarged bilateral axillary lymph nodes as well as prominent  bilateral hilar and mediastinal lymph nodes. Findings are nonspecific and may represent reactive lymphadenopathy and/or rimma metastatic disease.  - No evidence of pulmonary embolism.    Abdomen and pelvis:  - Increased size and number of hepatic metastatic disease.  - Right-sided nephroureteral stent in place with findings of obstruction including moderate hydroureteronephrosis and slightly delayed nephrogram. There is diffuse urothelial enhancement without evidence of renal abscess, as questioned.   - Postsurgical changes of low anterior resection and left lower quadrant colostomy with parastomal hernia.      Electronically Signed: Kamron Simons MD    4/21/2024 8:23 AM EDT    Workstation ID: [id number]          Impression: Colon cancer with lung mets  Neoplastic pain  Debility  [facility]    Plan: This point, did not have any goals of care conversations patient, however her CODE STATUS noted to be DNR with limited support which I agree with at present.  Most likely we will need to have conversations with her going forward about her overall wishes as she is been declining any  treatment for her colon cancer which is metastatic at this point.  Current pain regimen seems to be working appropriately.  Will adjust as we proceed forward, however the patient is fairly hypotensive so this may make using opioids somewhat of a issue.        Mauricio Jarquin,   04/21/24  13:16 EDT

## 2024-04-22 NOTE — SIGNIFICANT NOTE
"Pt came from ED . When speaking with pt Ms. Cardoso  stated  many times that she was tired and just wanted to rest and be left alone. During my conversation with pt and daughter both had verbalized that they didn't want any aggressive treatment or antibiotics . I notified Dr. Hurst who came to the bedside and spoke with pt and daughter about test results, and how the pt wanted to preceded with care, pt stated to Dr. Hurst that she didn't want any aggressive treatment  . Dr. Hurst  gave me verbal for palliative  care consult. I notified Dr. Jarquin who came and spoke with pt and daughter also. During my rounding pt verbalized that she just wanted to be left alone.Juan also came and did a living will with pt.           I called Dr. Hurst with a critical lab tropin. Dr. Hurst gave verbal orders over the phone and came to the pt bedside. I was present along with the pt friend during Dr. Hurst  hour conversation with the pt regarding clarifying treatment options. Dr. Hurst went over many treatment option for pt including heparin drip and getting cards on board. Pt was adamant on  no interventions. Pt  is AOX4 and decided on comfort care with pt verbalizing understanding of comfort care. Daughter ( POA) wasn't present in this conversation.   About 18:20  pt daughter was asking about comfort care and what that meant. I educated pt daughter on comfort care.PT daughter stated that her family had a bad experience with Hospice with her sister. Daughter stated that \" They gave my sister pain Medicaine to the point  she wasn't able to hold conversations, they took time we had left with her\" . Daughter stated that we respect her mom decision   on not wanting pain medication if she says no. I educated both daughters on pt rights.    Around 10 minutes later  pt became restless and uncomfortable. I assessed  the pt . Pt stated no pain. Pt breathing was abnormal ( see chart)  During this time pt refused any pain medication  " "interventions. Pt daughter became upset and  kept saying she's having a heart attack and we needed to do something. Daughter asked pt while pt was in distress if she wanted more aggressive treatment pt nodded head (yes). I than called Dr. Hurst verbal orders where gave. After getting off the phone with Dr. Hurst I went and discussed the med's that I was going to give pt ( I was going to put in as as a telephone order from  Dr. Hurst). Pt stated that she didn't want any of those medication I ask the pt again if I could give her pain medication for relief pt  stated no. Pt daughter stated \"give it to her I am the POA and I say give it\". I educated the daughter ( POA ) on the rights of the POA.  And informed the daughter that the pt is still able to  make her own choices. The pt did become agreeable to pain med's, Per my assessment pt was still uncomfortable and still symptomatic.  I notified Britton Shaver of pt condition and telephone orders were gave and administered with Ms. Cardoso consent.  My assessment after administering med's. Pt was clam resting and work of breathing had  eased.  Pt daughter was thankful and stated that \" I am sorry I just freaked out\" I know comfort care is what my mother wants, and I want to respect that. \" .   "

## 2024-04-22 NOTE — PAYOR COMM NOTE
"Helena Amaro (Dcsd. Female)     Kristi Mcdaniel, RN  Utilization Review  [phone]  [phone]      OBS status          Date of Birth   1945    Social Security Number       Address   [address] [address]    Home Phone   [phone]    MRN   [id number]       Rastafari   Religious    Marital Status                               Admission Date   24    Admission Type   Emergency    Admitting Provider   Liat Dewitt DO    Attending Provider       Department, Room/Bed   [facility] 4H, S477/1       Discharge Date   2024    Discharge Disposition       Discharge Destination                                 Attending Provider: (none)   Allergies: Tetracycline    Isolation: None   Infection: None   Code Status: No CPR    Ht: 154.9 cm (60.98\")   Wt: 64.6 kg (142 lb 6.7 oz)    Admission Cmt: None   Principal Problem: Acute respiratory failure [J96.00]                   Active Insurance as of 2024       Primary Coverage       Payor Plan Insurance Group Employer/Plan Group    [facility] WELL[employer] MED ADV HMO        Payor Plan Address Payor Plan Phone Number Payor Plan Fax Number Effective Dates    [address] [address]   2024 - None Entered    [facility] [id number]         Subscriber Name Subscriber Birth Date Member ID       HELENA AMARO 1945 [id number]                     Emergency Contacts        (Rel.) Home Phone Work Phone Mobile Phone    PHYLLIS AMARO (Daughter) -- -- [phone]    DONTAE BHANDARI (Daughter) -- -- [phone]                 History & Physical        Princess Hurst MD at 24 0924              [facility] Medicine Services  HISTORY AND PHYSICAL    Patient Name: Helena Amaro  : 1945  MRN: [id number]  Primary Care Physician: Jay Pelaez DO  Date of admission: 2024      Subjective  Subjective     Chief " Complaint:  Weakness    HPI:  Helena Cardoso is a 78 y.o. female with history of rectal cancer status post resection/colostomy (July 2022, Dr. Brito), right ureteral stenosis status post stent (follows with Dr. Vazquez at , placed on March 8). On 4/9/2024 she was seen by her primary care physician as a hospital follow-up.  She was complaining of urgency, frequency and hematuria at that time per office note.  UA was obtained.  She was prescribed an antibiotic at some point but did not take them due to the side effects.  Over the past 24 hours she had worsening flank pain with nausea, chills and subjective fever.  She became weaker and called EMS and presented to the ED.  In the ED, she was noted to be hypoxic and required supplemental oxygen.  Spinal medicine was called for admission.      Personal History     Past Medical History:   Diagnosis Date    Clostridium difficile infection 07/2022    Colon polyps     Coronary artery disease     Depression     Disease of thyroid gland     Elevated cholesterol     Hiatal hernia     Rectal cancer          Oncology Problem List:  Rectal cancer (02/21/2022; Status: Active)    Oncology/Hematology History     Past Surgical History:   Procedure Laterality Date    CARDIAC CATHETERIZATION  1992    COLON RESECTION N/A 7/13/2022    Procedure: ROBOTIC LOW ANTERIOR RESECTION , COLOSTOMY;  Surgeon: Kristi Henderson MD;  Location:  [facility];  Service: Robotics - DaVinci;  Laterality: N/A;    COLONOSCOPY  01/2022    COLOSTOMY N/A 7/15/2022    Procedure: COLOSTOMY REVISION;  Surgeon: Kristi Henderson MD;  Location:  [facility];  Service: General;  Laterality: N/A;    CORONARY ARTERY BYPASS GRAFT  1992    2 VESSEL    CYSTOSCOPY W/ URETERAL STENT PLACEMENT Bilateral 7/13/2022    Procedure: CYSTOSCOPY URETERAL CATHETER/STENT INSERTION;  Surgeon: Kristi Henderson MD;  Location:  [facility];  Service: Robotics - DaVinci;  Laterality: Bilateral;       Family History: family history includes Colon  cancer in her sister.     Social History:  reports that she quit smoking about 56 years ago. Her smoking use included cigarettes. She has never used smokeless tobacco. She reports that she does not currently use alcohol after a past usage of about 1.0 - 2.0 standard drink of alcohol per week. She reports that she does not use drugs.  Social History     Social History Narrative    Not on file       Medications:  Available home medication information reviewed.  CBD, Probiotic Product, Turmeric, Zinc, coenzyme Q10, levothyroxine, magnesium chloride ER, and sertraline    Allergies   Allergen Reactions    Tetracycline Rash       Objective  Objective     Vital Signs:   Temp:  [98 °F (36.7 °C)-99.7 °F (37.6 °C)] 98 °F (36.7 °C)  Heart Rate:  [] 89  Resp:  [20] 20  BP: ()/(59-76) 91/65  Flow (L/min):  [4-5] 5       Physical Exam   Constitutional: No acute distress, awake, alert  Respiratory: Clear to auscultation bilaterally, respiratory effort normal on 3 L  Cardiovascular: RRR  Gastrointestinal: Positive bowel sounds, soft, nontender, nondistended  Musculoskeletal: No bilateral ankle edema  Psychiatric: Appropriate affect, cooperative  Neurologic: Oriented x 3, no focal neurological deficits  Skin: No rashes      Result Review:  I have personally reviewed the results from the time of this admission to 4/21/2024 12:39 EDT and agree with these findings:  [x]  Laboratory list / accordion  [x]  Microbiology  [x]  Radiology  []  EKG/Telemetry   []  Cardiology/Vascular   []  Pathology  [x]  Old records  []  Other:      LAB RESULTS:      Lab 04/21/24  0654   WBC 5.00   HEMOGLOBIN 13.1   HEMATOCRIT 42.0   PLATELETS 262   NEUTROS ABS 4.53   IMMATURE GRANS (ABS) 0.00   LYMPHS ABS 0.41*   MONOS ABS 0.02*   EOS ABS 0.02   MCV 91.3   PROCALCITONIN 0.15   LACTATE 7.0*         Lab 04/21/24  0654   SODIUM 138   POTASSIUM 4.1   CHLORIDE 102   CO2 18.0*   ANION GAP 18.0*   BUN 19   CREATININE 0.81   EGFR 73.9   GLUCOSE 227*    CALCIUM 8.8         Lab 04/21/24  0654   TOTAL PROTEIN 6.4   ALBUMIN 3.6   GLOBULIN 2.8   ALT (SGPT) 6   AST (SGOT) 12   BILIRUBIN 0.6   ALK PHOS 79         Lab 04/21/24  0654   PROBNP 131.5   HSTROP T 69*                 UA          10/30/2023    12:36 2/25/2024    14:08 3/7/2024    12:19   Urinalysis   Squamous Epithelial Cells, UA 0-2     0-2     3-5       Specific Gravity, UA  1.022     >=1.030       Blood, UA  Negative     Small       Leukocytes, UA  Small     Moderate       RBC, UA <1     <1     <1       Bacteria, UA Negative     Present     Present          Details          This result is from an external source.               Microbiology Results (last 10 days)       Procedure Component Value - Date/Time    COVID-19 and FLU A/B PCR, 1 HR TAT - Swab, Nasopharynx [[id number]]  (Normal) Collected: 04/21/24 0744    Lab Status: Final result Specimen: Swab from Nasopharynx Updated: 04/21/24 0814     COVID19 Not Detected     Influenza A PCR Not Detected     Influenza B PCR Not Detected    Narrative:      Fact sheet for providers: https://www.fda.gov/media/055622/download    Fact sheet for patients: https://www.fda.gov/media/114399/download    Test performed by PCR.            CT Angiogram Chest    Result Date: 4/21/2024  CT ABDOMEN PELVIS W CONTRAST, CT ANGIOGRAM CHEST Date of Exam: 4/21/2024 7:37 AM EDT Indication: s/p renal stent placement eval for renal abcess. hypoxia, recent surgery, eval for pe Comparison: CT chest abdomen pelvis 2/1/2022. Technique: Axial CT images were obtained of the chest, abdomen, and pelvis following the uneventful intravenous administration of 85 mL Isovue-370. Reconstructed coronal and sagittal images were also obtained. Automated exposure control and iterative construction methods were used. Findings: CHEST: Cardiovascular: No pericardial effusion. Normal caliber thoracic aorta. Dilatation of the main pulmonary artery measuring 3.5 cm, which can be seen with pulmonary hypertension.  Coronary artery calcifications. Heart appears enlarged. Lymph nodes and mediastinum: Mildly enlarged bilateral axillary lymph nodes. Prominent but not pathologically enlarged bilateral hilar and mediastinal lymph nodes. Lungs: Numerous bilateral noncalcified pulmonary nodules, new from prior exam. For example, there is a 2.6 x 1.7 left lower lobe nodule (axial image 70) and a 1.9 x 1.2 cm right lower lobe nodule (series 4 image 61). Central airways are patent. No evidence of superimposed pneumonia. Pleura: No pleural effusion or pneumothorax. Bones and soft tissues: No acute or suspicious osseous abnormality. Sternotomy. Multilevel degenerative changes of the thoracic spine. Soft tissues are within normal limits. ABDOMEN and PELVIS: Liver: Multiple solid appearing liver lesions, increased in size and number from prior exam. For example, a lesion in the left hepatic lobe measures 6.0 x 3.7 cm (axial image 19) and a lesion in the right hepatic lobe measures 3.9 x 2.9 cm (axial image 38). Gallbladder and bile ducts: Gallbladder is unremarkable. No biliary ductal dilatation. Pancreas: No pancreatic duct dilation. No surrounding inflammation. Spleen: Normal in size. Small adjacent splenule. Adrenal glands: No discrete adrenal nodule. Kidneys and ureters: Moderate right-sided hydroureteronephrosis with urothelial thickening and slightly delayed nephrogram, with nephroureteral stent in place. No evidence of renal abscess. No left-sided hydronephrosis. Urinary bladder: Unremarkable. Reproductive Organs: Unremarkable. Stomach and Bowel: Postsurgical changes of low anterior resection and colostomy. No evidence of bowel obstruction. Hiatal hernia. Lymph nodes: No pathologically enlarged lymph nodes. Vasculature: No aneurysmal dilation of the abdominal aorta. Atherosclerosis. Peritoneum and retroperitoneum: No free air or free fluid. Bones and soft tissues: No acute or suspicious osseous abnormality. Multilevel degenerative  changes of the lumbar spine. Fat-containing left inguinal hernia. Left lower quadrant colostomy, as above, with parastomal hernia.     Impression: Impression: Chest: - Numerous new bilateral pulmonary nodules, likely metastatic disease. - Mildly enlarged bilateral axillary lymph nodes as well as prominent bilateral hilar and mediastinal lymph nodes. Findings are nonspecific and may represent reactive lymphadenopathy and/or rimma metastatic disease. - No evidence of pulmonary embolism. Abdomen and pelvis: - Increased size and number of hepatic metastatic disease. - Right-sided nephroureteral stent in place with findings of obstruction including moderate hydroureteronephrosis and slightly delayed nephrogram. There is diffuse urothelial enhancement without evidence of renal abscess, as questioned. - Postsurgical changes of low anterior resection and left lower quadrant colostomy with parastomal hernia. Electronically Signed: Kamron Simons MD  4/21/2024 8:23 AM EDT  Workstation ID: [id number]    CT Abdomen Pelvis With Contrast    Result Date: 4/21/2024  CT ABDOMEN PELVIS W CONTRAST, CT ANGIOGRAM CHEST Date of Exam: 4/21/2024 7:37 AM EDT Indication: s/p renal stent placement eval for renal abcess. hypoxia, recent surgery, eval for pe Comparison: CT chest abdomen pelvis 2/1/2022. Technique: Axial CT images were obtained of the chest, abdomen, and pelvis following the uneventful intravenous administration of 85 mL Isovue-370. Reconstructed coronal and sagittal images were also obtained. Automated exposure control and iterative construction methods were used. Findings: CHEST: Cardiovascular: No pericardial effusion. Normal caliber thoracic aorta. Dilatation of the main pulmonary artery measuring 3.5 cm, which can be seen with pulmonary hypertension. Coronary artery calcifications. Heart appears enlarged. Lymph nodes and mediastinum: Mildly enlarged bilateral axillary lymph nodes. Prominent but not pathologically enlarged  bilateral hilar and mediastinal lymph nodes. Lungs: Numerous bilateral noncalcified pulmonary nodules, new from prior exam. For example, there is a 2.6 x 1.7 left lower lobe nodule (axial image 70) and a 1.9 x 1.2 cm right lower lobe nodule (series 4 image 61). Central airways are patent. No evidence of superimposed pneumonia. Pleura: No pleural effusion or pneumothorax. Bones and soft tissues: No acute or suspicious osseous abnormality. Sternotomy. Multilevel degenerative changes of the thoracic spine. Soft tissues are within normal limits. ABDOMEN and PELVIS: Liver: Multiple solid appearing liver lesions, increased in size and number from prior exam. For example, a lesion in the left hepatic lobe measures 6.0 x 3.7 cm (axial image 19) and a lesion in the right hepatic lobe measures 3.9 x 2.9 cm (axial image 38). Gallbladder and bile ducts: Gallbladder is unremarkable. No biliary ductal dilatation. Pancreas: No pancreatic duct dilation. No surrounding inflammation. Spleen: Normal in size. Small adjacent splenule. Adrenal glands: No discrete adrenal nodule. Kidneys and ureters: Moderate right-sided hydroureteronephrosis with urothelial thickening and slightly delayed nephrogram, with nephroureteral stent in place. No evidence of renal abscess. No left-sided hydronephrosis. Urinary bladder: Unremarkable. Reproductive Organs: Unremarkable. Stomach and Bowel: Postsurgical changes of low anterior resection and colostomy. No evidence of bowel obstruction. Hiatal hernia. Lymph nodes: No pathologically enlarged lymph nodes. Vasculature: No aneurysmal dilation of the abdominal aorta. Atherosclerosis. Peritoneum and retroperitoneum: No free air or free fluid. Bones and soft tissues: No acute or suspicious osseous abnormality. Multilevel degenerative changes of the lumbar spine. Fat-containing left inguinal hernia. Left lower quadrant colostomy, as above, with parastomal hernia.     Impression: Impression: Chest: - Numerous  new bilateral pulmonary nodules, likely metastatic disease. - Mildly enlarged bilateral axillary lymph nodes as well as prominent bilateral hilar and mediastinal lymph nodes. Findings are nonspecific and may represent reactive lymphadenopathy and/or rimma metastatic disease. - No evidence of pulmonary embolism. Abdomen and pelvis: - Increased size and number of hepatic metastatic disease. - Right-sided nephroureteral stent in place with findings of obstruction including moderate hydroureteronephrosis and slightly delayed nephrogram. There is diffuse urothelial enhancement without evidence of renal abscess, as questioned. - Postsurgical changes of low anterior resection and left lower quadrant colostomy with parastomal hernia. Electronically Signed: Kamron Simons MD  4/21/2024 8:23 AM EDT  Workstation ID: [id number]         Assessment & Plan  Assessment & Plan       Acute respiratory failure    Helena Cardoso is a 78 y.o. female with history of rectal cancer status post resection/colostomy (July 2022, Dr. Brito), right ureteral stenosis status post stent (follows with Dr. Vazquez at , placed on March 8) with weakness and pain.    Acute hypoxic respiratory failure  -Requiring 5 L.  Suspect secondary to lung mets.  -Will likely require oxygen on discharge    Metastatic colon cancer  -History of colon cancer in July 22 with resection and ostomy placement.  Declined treatment for this at that time  -CT chest on presentation shows numerous bilateral pulmonary nodules and axillary, hilar and mediastinal lymphadenopathy.  Negative for PE  -CT abdomen pelvis shows hepatic mets, right ureteral stent with findings of obstruction, moderate hydronephrosis  -Start IV and p.o. pain medications  -Discussed with patient and family, agreeable to palliative care consult but do not feel she is ready for hospice. Currently DNR/DNI.    Possible UTI in the setting of ureteral stent  Ureteral obstruction the hydronephrosis  -Note on CT  scan, renal function within normal limits  -Currently without leukocytosis, normal procalcitonin.  Does have low-grade fevers.  -Need to consider antibiotics, was prescribed 1 to 2 weeks ago.  Unable to see culture results from [facility].  -Discussed with daughter, do not want any invasive procedures unless absolutely necessary.  Will do fear urology consult.  Creatinine within normal limits  -Patient would not want antibiotics if she has a urinary tract infection.    -Probiotics    Lactic acidosis  -Lactate 7 on presentation.  -Will give 1L LR bolus and then continue IVFs.  -Reflex pending    Elevated troponin  -Suspect demand ischemia secondary to respiratory failure  -Repeat pending    History of C. difficile   -Need to be cautious with antibiotics and likely use prophylactic vancomycin    Family would like to minimize hospital stay. Recommended fluids, pain control and palliative today. Possibly could be discharge home tomorrow.     Addendum 4:21pm:  Acute MI:  -Troponin trended up from .  Went to reevaluate patient, not complaining of any chest pain.  EKG was obtained which showed some T wave inversions but no ST elevation or depression.  -Long discussion with patient and friend at the bedside.  Friend is an anesthesiologist and helped patient understand the situation.  Noted that normally would start blood thinners and get cardiology involved plus or minus a left heart cath.  Given prior conversations with the patient she did not want any invasive measures.  Decided to pursue comfort measures and avoid any unnecessary interventions or medications.  Will defer starting heparin, Plavix, aspirin is within goals of care. Lactate trended down    Hypotension  -Has been hypotensive this morning with systolics in the 80s and MAP of 65.  She has received 1.5 L fluid without improvement.  We discussed not focusing on the numbers and focusing on medications to maintain comfort which could possibly reduce her blood  pressures.  Currently not having any symptoms.  Will go ahead and start midodrine 3 times daily.  Would like to avoid additional fluids given the fact that she may have some left ventricular dysfunction from her cardiac event.    I visited discussions regarding hospice given the new findings.  Overall the goal is to get her home and have symptoms controlled.  She is hesitant regarding hospice due to her prior bad experience with her daughter.  I discussed the benefits of hospice in order to keeping her out of the hospital and also managing her symptoms.  Would benefit from IV Lasix at home if it is possible if she needs it later on.  Patient agreeable to talk to hospice tomorrow once her family gets here from [place].  Will place a hospice consult for tomorrow.    Patient is of sound mind and able to maker her own decisions.       DVT prophylaxis:  Medical DVT prophylaxis orders are present.          CODE STATUS:    Code Status and Medical Interventions:   Ordered at: 04/21/24 0846     Medical Intervention Limits:    NO intubation (DNI)    NO antiarrhythmic drugs    NO artificial nutrition    NO dialysis    NO cardioversion    NO vasopressors     Code Status (Patient has no pulse and is not breathing):    No CPR (Do Not Attempt to Resuscitate)     Medical Interventions (Patient has pulse or is breathing):    Limited Support       Expected Discharge   Expected Discharge Date: 4/22/2024; Expected Discharge Time:      Princess Hurst MD  04/21/24    Total time spent: 90  Time spent includes time reviewing chart, face-to-face time, counseling patient/family/caregiver, ordering medications/tests/procedures, communicating with other health care professionals, documenting clinical information in the electronic health record, and coordination of care.       Electronically signed by Princess Hurst MD at 04/21/24 0224          Emergency Department Notes        Mariluz Ware RN at 04/21/24 3670            Helena Cardoso    Nursing Report ED to Floor:  Mental status: A&Ox4  Ambulatory status: x1  Oxygen Therapy:  4L, baseline RA  Cardiac Rhythm: SR  Admitted from: ed, home  Safety Concerns:  fall risk  Social Issues: none  ED Room #:  17    ED Nurse Phone Extension - [phone] or may call [phone].      HPI:   Chief Complaint   Patient presents with    Vomiting    Dizziness       Past Medical History:  Past Medical History:   Diagnosis Date    Clostridium difficile infection 07/2022    Colon polyps     Coronary artery disease     Depression     Disease of thyroid gland     Elevated cholesterol     Hiatal hernia     Rectal cancer         Past Surgical History:  Past Surgical History:   Procedure Laterality Date    CARDIAC CATHETERIZATION  1992    COLON RESECTION N/A 7/13/2022    Procedure: ROBOTIC LOW ANTERIOR RESECTION , COLOSTOMY;  Surgeon: Kristi Henderson MD;  Location:  [facility];  Service: Robotics - DaVinci;  Laterality: N/A;    COLONOSCOPY  01/2022    COLOSTOMY N/A 7/15/2022    Procedure: COLOSTOMY REVISION;  Surgeon: Kristi Henderson MD;  Location:  [facility];  Service: General;  Laterality: N/A;    CORONARY ARTERY BYPASS GRAFT  1992    2 VESSEL    CYSTOSCOPY W/ URETERAL STENT PLACEMENT Bilateral 7/13/2022    Procedure: CYSTOSCOPY URETERAL CATHETER/STENT INSERTION;  Surgeon: Kristi Henderson MD;  Location:  [facility];  Service: Robotics - DaVinci;  Laterality: Bilateral;        Admitting Doctor:   Princess Hurst MD    Consulting Provider(s):  Consults       No orders found from 3/23/2024 to 4/22/2024.             Admitting Diagnosis:   The primary encounter diagnosis was Generalized abdominal pain. Diagnoses of Lactic acidosis, Metastatic colon cancer to liver, Lung nodules, and Elevated glucose were also pertinent to this visit.    Most Recent Vitals:   Vitals:    04/21/24 0716 04/21/24 0813 04/21/24 0828 04/21/24 0843   BP: 114/71 108/67 93/63 99/59   BP Location:       Patient Position:       Pulse: 97 82 92 87    Resp:       Temp:       TempSrc:       SpO2: 93% 94% 93% 93%   Weight:       Height:           Active LDAs/IV Access:   Lines, Drains & Airways       Active LDAs       Name Placement date Placement time Site Days    Peripheral IV 04/21/24 0635 Anterior;Distal;Right;Upper Arm 04/21/24  0635  Arm  less than 1    Colostomy LLQ 07/20/22  --  LLQ  641                    Labs (abnormal labs have a star):   Labs Reviewed   COMPREHENSIVE METABOLIC PANEL - Abnormal; Notable for the following components:       Result Value    Glucose 227 (*)     CO2 18.0 (*)     Anion Gap 18.0 (*)     All other components within normal limits    Narrative:     GFR Normal >60  Chronic Kidney Disease <60  Kidney Failure <15    The GFR formula is only valid for adults with stable renal function between ages 18 and 70.   SINGLE HS TROPONIN T - Abnormal; Notable for the following components:    HS Troponin T 69 (*)     All other components within normal limits    Narrative:     High Sensitive Troponin T Reference Range:  <14.0 ng/L- Negative Female for AMI  <22.0 ng/L- Negative Male for AMI  >=14 - Abnormal Female indicating possible myocardial injury.  >=22 - Abnormal Male indicating possible myocardial injury.   Clinicians would have to utilize clinical acumen, EKG, Troponin, and serial changes to determine if it is an Acute Myocardial Infarction or myocardial injury due to an underlying chronic condition.        LACTIC ACID, PLASMA - Abnormal; Notable for the following components:    Lactate 7.0 (*)     All other components within normal limits   CBC WITH AUTO DIFFERENTIAL - Abnormal; Notable for the following components:    MCHC 31.2 (*)     Neutrophil % 90.6 (*)     Lymphocyte % 8.2 (*)     Monocyte % 0.4 (*)     Lymphocytes, Absolute 0.41 (*)     Monocytes, Absolute 0.02 (*)     All other components within normal limits   COVID-19 AND FLU A/B, NP SWAB IN TRANSPORT MEDIA 1 HR TAT - Normal    Narrative:     Fact sheet for providers:  "https://www.fda.gov/media/030630/download    Fact sheet for patients: https://www.fda.gov/media/724756/download    Test performed by PCR.   BNP (IN-HOUSE) - Normal    Narrative:     This assay is used as an aid in the diagnosis of individuals suspected of having heart failure. It can be used as an aid in the diagnosis of acute decompensated heart failure (ADHF) in patients presenting with signs and symptoms of ADHF to the emergency department (ED). In addition, NT-proBNP of <300 pg/mL indicates ADHF is not likely.    Age Range Result Interpretation  NT-proBNP Concentration (pg/mL:      <50             Positive            >450                   Gray                 300-450                    Negative             <300    50-75           Positive            >900                  Gray                300-900                  Negative            <300      >75             Positive            >1800                  Gray                300-1800                  Negative            <300   PROCALCITONIN - Normal    Narrative:     As a Marker for Sepsis (Non-Neonates):    1. <0.5 ng/mL represents a low risk of severe sepsis and/or septic shock.  2. >2 ng/mL represents a high risk of severe sepsis and/or septic shock.    As a Marker for Lower Respiratory Tract Infections that require antibiotic therapy:    PCT on Admission    Antibiotic Therapy       6-12 Hrs later    >0.5                Strongly Recommended  >0.25 - <0.5        Recommended   0.1 - 0.25          Discouraged              Remeasure/reassess PCT  <0.1                Strongly Discouraged     Remeasure/reassess PCT    As 28 day mortality risk marker: \"Change in Procalcitonin Result\" (>80% or <=80%) if Day 0 (or Day 1) and Day 4 values are available. Refer to http://www.[employer]-pct-calculator.com    Change in PCT <=80%  A decrease of PCT levels below or equal to 80% defines a positive change in PCT test result representing a higher risk for 28-day all-cause mortality " of patients diagnosed with severe sepsis for septic shock.    Change in PCT >80%  A decrease of PCT levels of more than 80% defines a negative change in PCT result representing a lower risk for 28-day all-cause mortality of patients diagnosed with severe sepsis or septic shock.      BLOOD CULTURE   BLOOD CULTURE   RAINBOW DRAW    Narrative:     The following orders were created for panel order [facility]ley Draw.  Procedure                               Abnormality         Status                     ---------                               -----------         ------                     Green Top (Gel)[[id number]]                                  Final result               Lavender Top[[id number]]                                     Final result               Gold Top - SST[[id number]]                                   Final result               Mcnamara Top[[id number]]                                         In process                 Light Blue Top[[id number]]                                   Final result                 Please view results for these tests on the individual orders.   URINALYSIS W/ CULTURE IF INDICATED   LACTIC ACID, REFLEX   HIGH SENSITIVITIY TROPONIN T 2HR   GREEN TOP   LAVENDER TOP   GOLD TOP - SST   LIGHT BLUE TOP   CBC AND DIFFERENTIAL    Narrative:     The following orders were created for panel order CBC & Differential.  Procedure                               Abnormality         Status                     ---------                               -----------         ------                     CBC Auto Differential[[id number]]        Abnormal            Final result                 Please view results for these tests on the individual orders.   GRAY TOP       Meds Given in ED:   Medications   sodium chloride 0.9 % infusion (has no administration in time range)   sodium chloride 0.9 % bolus 1,938 mL (has no administration in time range)   sodium chloride 0.9 % bolus 500 mL (500 mL Intravenous New Bag 4/21/24  5845)   HYDROmorphone (DILAUDID) injection 0.25 mg (0.25 mg Intravenous Given 4/21/24 0726)   ondansetron (ZOFRAN) injection 4 mg (4 mg Intravenous Given 4/21/24 0726)   iopamidol (ISOVUE-370) 76 % injection 100 mL (85 mL Intravenous Given 4/21/24 2733)     sodium chloride, 125 mL/hr         Last NIH score:                                                          Dysphagia screening results:  Patient Factors Component (Dysphagia:Stroke or Rule-out)  Best Eye Response: 4-->(E4) spontaneous (04/21/24 0747)  Best Motor Response: 6-->(M6) obeys commands (04/21/24 0747)  Best Verbal Response: 5-->(V5) oriented (04/21/24 0747)  [place] Coma Scale Score: 15 (04/21/24 0747)     [place] Coma Scale:  No data recorded     CIWA:        Restraint Type:            Isolation Status:  No active isolations          Electronically signed by Mariluz Ware RN at 04/21/24 0918       Steve Harris MD at 04/21/24 0654          Subjective   History of Present Illness  This is a pleasant 78-year-old female who is accompanied by her daughter.  Baseline she lives independently is not on oxygen or CPAP can go out to the store drive herself does light housework.  She was out to the store as recently as Friday of this past week.    Somewhat of a complex past medical history including colectomy in July 2022 by Dr. Brito for colon cancer still has an ostomy and has a hernia is waiting to talk to another surgeon about repair of this.  Also recent renal history she was seeing Dr. Vazquez at  and had a right ureteral stenosis of unclear etiology had have a stent placed on the eighth of last month for this.    Subsequently developed infection and was prescribed antibiotics but she did not take them as she was worried about the side effects from them.  This is a week or 2 ago.    Over the past 24 hours she is decompensated with increased flank pain associate with nausea vomiting chills and subjective fever.  She just felt miserable become  weaker and called EMS to bring her to the hospital.    She really did not want to go back to  and wanted to get her care here so she came here for further evaluation.    She was noted to be hypoxic and requiring supplemental oxygen sounds like there is a possibility she could have aspirated.  In general she just feels poorly and still nauseated still has pain.    Patient is actually a bit lethargic currently and her daughter does a lot of the history for.  I did my best with further review of systems and history and chart review.        Review of Systems   All other systems reviewed and are negative.      Past Medical History:   Diagnosis Date    Clostridium difficile infection 07/2022    Colon polyps     Coronary artery disease     Depression     Disease of thyroid gland     Elevated cholesterol     Hiatal hernia     Rectal cancer    Op note  3/8/24  Karolina Cardoso is a 78 y.o. female with right hydroureteronephrosis down to level of the bladder, of unknown etiology, with increasing right flank pain. She is afebrile, hemodynamically stable, and labs are unremarkable. UA has leukocytes and blood, no nitrites, bacteria present. She just finished 10 day course of cefodroxil. She was taken to the OR 3-8-24 for cystoscopy with right retrograde pyelogram and right ureteral stent placement. The study demonstrated focal narrowing of her distal right ureter, and her ureter was too narrow to access with a ureteroscope. She tolerated the procedure well. She will be discharged with medications for stent discomfort. We will plan for cystoscopy with repeat retrograde pyelogram and possible diagnostic ureteroscopy with ureteral stent exchange in three months.     Surgeries and Procedures  Procedures performed in this encounter   Procedures   Case Request Operating Room: CYSTOSCOPY, WITH URETERAL STENT INSERTION    echo 3/22  This result has an attachment that is not available.     Left Ventricle: The left ventricle is  normal size. There is normal left   ventricular myocardial thickness and mass. The left ventricular systolic   function is borderline reduced. The LVEF as measured by biplane volume is   52%. The inferolateral, inferior and inferoseptal walls are hypokinetic.   The left ventricular filling pressure is normal.     Right Ventricle: Right ventricle size is normal. The right ventricular   systolic function is normal.     Valves: Mild aortic regurigtation.     Pericardium: No pericardial effusion.   Allergies   Allergen Reactions    Tetracycline Rash       Past Surgical History:   Procedure Laterality Date    CARDIAC CATHETERIZATION      COLON RESECTION N/A 2022    Procedure: ROBOTIC LOW ANTERIOR RESECTION , COLOSTOMY;  Surgeon: Kristi Henderson MD;  Location:  [facility];  Service: Robotics - DaVinci;  Laterality: N/A;    COLONOSCOPY  2022    COLOSTOMY N/A 7/15/2022    Procedure: COLOSTOMY REVISION;  Surgeon: Kristi Henderson MD;  Location:  [facility];  Service: General;  Laterality: N/A;    CORONARY ARTERY BYPASS GRAFT      2 VESSEL    CYSTOSCOPY W/ URETERAL STENT PLACEMENT Bilateral 2022    Procedure: CYSTOSCOPY URETERAL CATHETER/STENT INSERTION;  Surgeon: Kristi Henderson MD;  Location:  [facility];  Service: Robotics - Citizen.VCinci;  Laterality: Bilateral;       Family History   Problem Relation Age of Onset    Colon cancer Sister        Social History     Socioeconomic History    Marital status:    Tobacco Use    Smoking status: Former     Current packs/day: 0.00     Types: Cigarettes     Quit date:      Years since quittin.3    Smokeless tobacco: Never   Vaping Use    Vaping status: Never Used   Substance and Sexual Activity    Alcohol use: Not Currently     Alcohol/week: 1.0 - 2.0 standard drink of alcohol     Types: 1 - 2 Glasses of wine per week    Drug use: Never    Sexual activity: Defer           Objective   Physical Exam  Vitals and nursing note reviewed.   Constitutional:        Comments: This is a 78-year-old who is looks like she does not feel well she is little bit lethargic she is able to give some history but just quickly falls back to sleep.  She does not appear to be in any distress.   HENT:      Head: Normocephalic and atraumatic.      Right Ear: External ear normal.      Left Ear: External ear normal.      Nose: Nose normal.      Mouth/Throat:      Comments: Her mouth is little bit dry.  Eyes:      Extraocular Movements: Extraocular movements intact.      Conjunctiva/sclera: Conjunctivae normal.      Pupils: Pupils are equal, round, and reactive to light.   Neck:      Vascular: No carotid bruit.   Cardiovascular:      Rate and Rhythm: Normal rate and regular rhythm.      Pulses: Normal pulses.      Heart sounds: Normal heart sounds.   Pulmonary:      Comments: Decreased breath sounds bilaterally.  Abdominal:      Comments: Ostomy on the left side producing stool no blood.  Positive bowel sounds soft little bit tenderness in the right side no organomegaly masses or guarding.  Right flank mildly tender to palpation without mass or rash.  BMI 26.   Musculoskeletal:      Cervical back: Normal range of motion and neck supple. No rigidity or tenderness.      Comments: Equal pulses in all 4 extremities 4/4 trace of edema at the ankles which is chronic for the patient no synovitis rash or venous cords.   Lymphadenopathy:      Cervical: No cervical adenopathy.   Skin:     General: Skin is warm and dry.      Capillary Refill: Capillary refill takes less than 2 seconds.   Neurological:      Comments: Face symmetric voice soft tongue midline.  Vision, hearing, and speech are preserved.  Mild generalized weakness without focality.         Procedures          ED Course                   Recent Results (from the past 24 hour(s))   Green Top (Gel)    Collection Time: 04/21/24  6:54 AM   Result Value Ref Range    Extra Tube Hold for add-ons.    Lavender Top    Collection Time: 04/21/24  6:54 AM    Result Value Ref Range    Extra Tube hold for add-on    Gold Top - SST    Collection Time: 04/21/24  6:54 AM   Result Value Ref Range    Extra Tube Hold for add-ons.    Gray Top    Collection Time: 04/21/24  6:54 AM   Result Value Ref Range    Extra Tube Hold for add-ons.    Light Blue Top    Collection Time: 04/21/24  6:54 AM   Result Value Ref Range    Extra Tube Hold for add-ons.    Comprehensive Metabolic Panel    Collection Time: 04/21/24  6:54 AM    Specimen: Blood   Result Value Ref Range    Glucose 227 (H) 65 - 99 mg/dL    BUN 19 8 - 23 mg/dL    Creatinine 0.81 0.57 - 1.00 mg/dL    Sodium 138 136 - 145 mmol/L    Potassium 4.1 3.5 - 5.2 mmol/L    Chloride 102 98 - 107 mmol/L    CO2 18.0 (L) 22.0 - 29.0 mmol/L    Calcium 8.8 8.6 - 10.5 mg/dL    Total Protein 6.4 6.0 - 8.5 g/dL    Albumin 3.6 3.5 - 5.2 g/dL    ALT (SGPT) 6 1 - 33 U/L    AST (SGOT) 12 1 - 32 U/L    Alkaline Phosphatase 79 39 - 117 U/L    Total Bilirubin 0.6 0.0 - 1.2 mg/dL    Globulin 2.8 gm/dL    A/G Ratio 1.3 g/dL    BUN/Creatinine Ratio 23.5 7.0 - 25.0    Anion Gap 18.0 (H) 5.0 - 15.0 mmol/L    eGFR 73.9 >60.0 mL/min/1.73   BNP    Collection Time: 04/21/24  6:54 AM    Specimen: Blood   Result Value Ref Range    proBNP 131.5 0.0 - 1,800.0 pg/mL   Single High Sensitivity Troponin T    Collection Time: 04/21/24  6:54 AM    Specimen: Blood   Result Value Ref Range    HS Troponin T 69 (C) <14 ng/L   Lactic Acid, Plasma    Collection Time: 04/21/24  6:54 AM    Specimen: Blood   Result Value Ref Range    Lactate 7.0 (C) 0.5 - 2.0 mmol/L   Procalcitonin    Collection Time: 04/21/24  6:54 AM    Specimen: Blood   Result Value Ref Range    Procalcitonin 0.15 0.00 - 0.25 ng/mL   CBC Auto Differential    Collection Time: 04/21/24  6:54 AM    Specimen: Blood   Result Value Ref Range    WBC 5.00 3.40 - 10.80 10*3/mm3    RBC 4.60 3.77 - 5.28 10*6/mm3    Hemoglobin 13.1 12.0 - 15.9 g/dL    Hematocrit 42.0 34.0 - 46.6 %    MCV 91.3 79.0 - 97.0 fL    MCH 28.5  26.6 - 33.0 pg    MCHC 31.2 (L) 31.5 - 35.7 g/dL    RDW 13.0 12.3 - 15.4 %    RDW-SD 43.6 37.0 - 54.0 fl    MPV 10.1 6.0 - 12.0 fL    Platelets 262 140 - 450 10*3/mm3    Neutrophil % 90.6 (H) 42.7 - 76.0 %    Lymphocyte % 8.2 (L) 19.6 - 45.3 %    Monocyte % 0.4 (L) 5.0 - 12.0 %    Eosinophil % 0.4 0.3 - 6.2 %    Basophil % 0.4 0.0 - 1.5 %    Immature Grans % 0.0 0.0 - 0.5 %    Neutrophils, Absolute 4.53 1.70 - 7.00 10*3/mm3    Lymphocytes, Absolute 0.41 (L) 0.70 - 3.10 10*3/mm3    Monocytes, Absolute 0.02 (L) 0.10 - 0.90 10*3/mm3    Eosinophils, Absolute 0.02 0.00 - 0.40 10*3/mm3    Basophils, Absolute 0.02 0.00 - 0.20 10*3/mm3    Immature Grans, Absolute 0.00 0.00 - 0.05 10*3/mm3    nRBC 0.0 0.0 - 0.2 /100 WBC   ECG 12 Lead Electrolyte Imbalance    Collection Time: 04/21/24  7:27 AM   Result Value Ref Range    QT Interval 412 ms    QTC Interval 539 ms   COVID-19 and FLU A/B PCR, 1 HR TAT - Swab, Nasopharynx    Collection Time: 04/21/24  7:44 AM    Specimen: Nasopharynx; Swab   Result Value Ref Range    COVID19 Not Detected Not Detected - Ref. Range    Influenza A PCR Not Detected Not Detected    Influenza B PCR Not Detected Not Detected   STAT Lactic Acid, Reflex    Collection Time: 04/21/24 12:28 PM    Specimen: Blood   Result Value Ref Range    Lactate 2.6 (C) 0.5 - 2.0 mmol/L   High Sensitivity Troponin T 2Hr    Collection Time: 04/21/24 12:28 PM    Specimen: Blood   Result Value Ref Range    HS Troponin T 4,448 (C) <14 ng/L    Troponin T Delta 4,379 (C) >=-4 - <+4 ng/L   ECG 12 Lead Other; tropion increased    Collection Time: 04/21/24  1:20 PM   Result Value Ref Range    QT Interval 406 ms    QTC Interval 491 ms     Note: In addition to lab results from this visit, the labs listed above may include labs taken at another facility or during a different encounter within the last 24 hours. Please correlate lab times with ED admission and discharge times for further clarification of the services performed during  this visit.    CT Angiogram Chest   Final Result   Impression:      Chest:   - Numerous new bilateral pulmonary nodules, likely metastatic disease.   - Mildly enlarged bilateral axillary lymph nodes as well as prominent bilateral hilar and mediastinal lymph nodes. Findings are nonspecific and may represent reactive lymphadenopathy and/or rimma metastatic disease.   - No evidence of pulmonary embolism.      Abdomen and pelvis:   - Increased size and number of hepatic metastatic disease.   - Right-sided nephroureteral stent in place with findings of obstruction including moderate hydroureteronephrosis and slightly delayed nephrogram. There is diffuse urothelial enhancement without evidence of renal abscess, as questioned.    - Postsurgical changes of low anterior resection and left lower quadrant colostomy with parastomal hernia.         Electronically Signed: Kamron Simons MD     4/21/2024 8:23 AM EDT     Workstation ID: [id number]      CT Abdomen Pelvis With Contrast   Final Result   Impression:      Chest:   - Numerous new bilateral pulmonary nodules, likely metastatic disease.   - Mildly enlarged bilateral axillary lymph nodes as well as prominent bilateral hilar and mediastinal lymph nodes. Findings are nonspecific and may represent reactive lymphadenopathy and/or rimma metastatic disease.   - No evidence of pulmonary embolism.      Abdomen and pelvis:   - Increased size and number of hepatic metastatic disease.   - Right-sided nephroureteral stent in place with findings of obstruction including moderate hydroureteronephrosis and slightly delayed nephrogram. There is diffuse urothelial enhancement without evidence of renal abscess, as questioned.    - Postsurgical changes of low anterior resection and left lower quadrant colostomy with parastomal hernia.         Electronically Signed: Kamron Simons MD     4/21/2024 8:23 AM EDT     Workstation ID: [id number]        Vitals:    04/21/24 0843 04/21/24 0900 04/21/24 0930  04/21/24 1100   BP: 99/59 97/64 91/65    BP Location:    Left arm   Patient Position:    Lying   Pulse: 87 85 89    Resp:    20   Temp:    98 °F (36.7 °C)   TempSrc:    Oral   SpO2: 93% 93% 94%    Weight:       Height:         Medications   sodium chloride 0.9 % infusion (125 mL/hr Intravenous New Bag 4/21/24 1043)   levothyroxine (SYNTHROID, LEVOTHROID) tablet 88 mcg (has no administration in time range)   lactobacillus acidophilus (RISAQUAD) capsule 1 capsule (has no administration in time range)   sertraline (ZOLOFT) tablet 25 mg (has no administration in time range)   sodium chloride 0.9 % flush 10 mL (has no administration in time range)   sodium chloride 0.9 % flush 10 mL (has no administration in time range)   sodium chloride 0.9 % infusion 40 mL (has no administration in time range)   Enoxaparin Sodium (LOVENOX) syringe 40 mg (40 mg Subcutaneous Not Given 4/21/24 1137)   Potassium Replacement - Follow Nurse / BPA Driven Protocol (has no administration in time range)   Magnesium Standard Dose Replacement - Follow Nurse / BPA Driven Protocol (has no administration in time range)   Phosphorus Replacement - Follow Nurse / BPA Driven Protocol (has no administration in time range)   Calcium Replacement - Follow Nurse / BPA Driven Protocol (has no administration in time range)   oxyCODONE (ROXICODONE) immediate release tablet 5 mg (has no administration in time range)   HYDROmorphone (DILAUDID) injection 0.25 mg (has no administration in time range)   sennosides-docusate (PERICOLACE) 8.6-50 MG per tablet 2 tablet (has no administration in time range)     And   polyethylene glycol (MIRALAX) packet 17 g (has no administration in time range)     And   bisacodyl (DULCOLAX) EC tablet 5 mg (has no administration in time range)     And   bisacodyl (DULCOLAX) suppository 10 mg (has no administration in time range)   ondansetron ODT (ZOFRAN-ODT) disintegrating tablet 4 mg ( Oral Not Given:  See Alt 4/21/24 1210)     Or    ondansetron (ZOFRAN) injection 4 mg (4 mg Intravenous Given 4/21/24 1210)   ketorolac (TORADOL) injection 15 mg (15 mg Intravenous Given 4/21/24 1210)   midodrine (PROAMATINE) tablet 10 mg (has no administration in time range)   sodium chloride 0.9 % bolus 500 mL (0 mL Intravenous Stopped 4/21/24 0927)   HYDROmorphone (DILAUDID) injection 0.25 mg (0.25 mg Intravenous Given 4/21/24 0726)   ondansetron (ZOFRAN) injection 4 mg (4 mg Intravenous Given 4/21/24 0726)   sodium chloride 0.9 % bolus 1,938 mL (1,938 mL Intravenous New Bag 4/21/24 0927)   iopamidol (ISOVUE-370) 76 % injection 100 mL (85 mL Intravenous Given 4/21/24 0753)     ECG/EMG Results (last 24 hours)       ** No results found for the last 24 hours. **          ECG 12 Lead Other; tropion increased   Preliminary Result   Test Reason : Other~   Blood Pressure :   */*   mmHG   Vent. Rate :  88 BPM     Atrial Rate :  88 BPM      P-R Int : 158 ms          QRS Dur :  80 ms       QT Int : 406 ms       P-R-T Axes :  65 -16  13 degrees      QTc Int : 491 ms      Sinus rhythm with occasional and consecutive premature ventricular    complexes and fusion complexes   Inferior infarct (cited on or before 21-APR-2024)   Abnormal ECG   When compared with ECG of 21-APR-2024 07:27,   fusion complexes are now present   premature ventricular complexes are now present   Questionable change in QRS duration   Criteria for Anterior infarct are no longer present   Criteria for Anterolateral infarct are no longer present      Referred By:            Confirmed By:       ECG 12 Lead Electrolyte Imbalance   Final Result   Test Reason : Electrolyte Imbalance   Blood Pressure :   */*   mmHG   Vent. Rate : 103 BPM     Atrial Rate : 103 BPM      P-R Int : 200 ms          QRS Dur : 130 ms       QT Int : 412 ms       P-R-T Axes :   * -87  84 degrees      QTc Int : 539 ms      Sinus tachycardia   Left axis deviation   Nonspecific intraventricular block   Inferior infarct , age  undetermined   Anterolateral infarct , age undetermined   Abnormal ECG   When compared with ECG of 12-JUL-2022 09:00,   Significant changes have occurred   suspect rate dependent bundle branch block   Confirmed by CHEVY KOCH, LORENZO ([id number]) on 4/21/2024 7:30:41 AM      Referred By: JAYNA           Confirmed By: LORENZO REECE MD                                    Medical Decision Making      I have reviewed all available studies at bedside with the patient and her family.    I was able to review some of her  medical records including his CT scan she had in February which showed what appeared to be metastatic cancer in her discussion with her primary care provider at  regarding this.    She did have her stent placed last month at [facility] talk to Dr. Naranjo today on-call for urology.  He had seen the patient previously when she had her initial surgery 2 years ago he would be willing to follow her should she need manipulation of her stent.  I think this is likely not a big issue for the patient currently.  Urine is pending at the time of admission will be followed by the admitting provider.    Unfortunately it looks like she has had fairly aggressive spread of her metastatic cancer likely primary colon cancer.  Accompanied by lactic acidosis but she has no evidence of pulmonary embolus.    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    She really does not want any aggressive treatment of her cancer.  With this in mind I began end-of-life discussions with the patient.  This point we all agree if she would have something catastrophic happening such as cardiopulmonary arrest she would not want to be resuscitated I have filled out the DNR paperwork regarding this.  However she would like to be more comfortable than what she is now and will admit her to the hospital for hydration and recheck for lactic acid  and to come up with a plan perhaps with hospice going forward as she may be amenable to this to help her navigate what is likely to be the last weeks and months of her life.    I contact Dr. Scanlon on-call hospital medicine and her colleagues will admit the patient.    All are agreeable to plan    Problems Addressed:  Elevated glucose: chronic illness or injury  Generalized abdominal pain: complicated acute illness or injury with systemic symptoms that poses a threat to life or bodily functions  Lactic acidosis: complicated acute illness or injury with systemic symptoms that poses a threat to life or bodily functions  Lung nodules: complicated acute illness or injury with systemic symptoms that poses a threat to life or bodily functions  Metastatic colon cancer to liver: complicated acute illness or injury with systemic symptoms that poses a threat to life or bodily functions    Amount and/or Complexity of Data Reviewed  Independent Historian: caregiver  External Data Reviewed: labs, radiology, ECG and notes.  Labs: ordered. Decision-making details documented in ED Course.  Radiology: ordered and independent interpretation performed. Decision-making details documented in ED Course.  ECG/medicine tests: ordered and independent interpretation performed. Decision-making details documented in ED Course.    Risk  Prescription drug management.  Parenteral controlled substances.  Decision regarding hospitalization.  Decision not to resuscitate or to de-escalate care because of poor prognosis.        Final diagnoses:   Generalized abdominal pain   Lactic acidosis   Metastatic colon cancer to liver   Lung nodules   Elevated glucose       ED Disposition  ED Disposition       ED Disposition   Decision to Admit    Condition   --    Comment   Level of Care: Telemetry [5]   Diagnosis: Acute respiratory failure [518.81.ICD-9-CM]   Admitting Physician: MARISELA WEIR [[id number]]   Attending Physician: MARISELA WEIR [[id number]]                  No follow-up provider specified.       Medication List      No changes were made to your prescriptions during this visit.            Steve Harris MD  04/21/24 1732       Steve Harris MD  04/21/24 1733      Electronically signed by Steve Harris MD at 04/21/24 1733       Vital Signs (last day) before discharge       Date/Time Temp Temp src Pulse Resp BP Patient Position SpO2    04/21/24 1100 98 (36.7) Oral -- 20 -- Lying --    04/21/24 0930 -- -- 89 -- 91/65 -- 94    04/21/24 0900 -- -- 85 -- 97/64 -- 93    04/21/24 0843 -- -- 87 -- 99/59 -- 93    04/21/24 0828 -- -- 92 -- 93/63 -- 93    04/21/24 0813 -- -- 82 -- 108/67 -- 94    04/21/24 0716 -- -- 97 -- 114/71 -- 93    04/21/24 0701 -- -- 100 -- 121/73 -- 93    04/21/24 0644 -- -- -- -- -- -- 90    04/21/24 06:38:04 99.7 (37.6) Oral 116 20 124/76 Lying 86          Oxygen Therapy (last day) before discharge       Date/Time SpO2 Device (Oxygen Therapy) Flow (L/min) Oxygen Concentration (%) ETCO2 (mmHg)    04/21/24 1945 -- nonrebreather mask 15 -- --    04/21/24 1800 -- nonrebreather mask 15 -- --    04/21/24 1600 -- nasal cannula 5 -- --    04/21/24 1400 -- nasal cannula 5 -- --    04/21/24 0930 94 -- -- -- --    04/21/24 0900 93 -- -- -- --    04/21/24 0843 93 -- -- -- --    04/21/24 0828 93 -- -- -- --    04/21/24 0813 94 -- -- -- --    04/21/24 0716 93 -- -- -- --    04/21/24 0701 93 -- -- -- --    04/21/24 0644 90 nasal cannula 5 -- --    04/21/24 0639 -- -- 4 -- --    04/21/24 06:38:04 86 -- -- -- --          Lines, Drains & Airways       Active LDAs       Name Placement date Placement time Site Days    Peripheral IV 04/21/24 0635 Anterior;Distal;Right;Upper Arm 04/21/24  0635  Arm  1    Colostomy Q 07/20/22  --  Q  642                  Current Facility-Administered Medications   Medication Dose Route Frequency Provider Last Rate Last Admin    sennosides-docusate (PERICOLACE) 8.6-50 MG per tablet 2 tablet  2 tablet Oral BID PRN  Princess Hurst MD        And    polyethylene glycol (MIRALAX) packet 17 g  17 g Oral Daily PRN Princess Hurst MD        And    bisacodyl (DULCOLAX) EC tablet 5 mg  5 mg Oral Daily PRN Princess Hurst MD        And    bisacodyl (DULCOLAX) suppository 10 mg  10 mg Rectal Daily PRN Princess Hurst MD        haloperidol lactate (HALDOL) injection 0.5 mg  0.5 mg Intravenous 4x Daily PRN Mauricio Jarquin, DO   0.5 mg at 04/22/24 0330    HYDROmorphone (DILAUDID) injection 0.5 mg  0.5 mg Intravenous Q1H PRN Mauricio Jarquin, DO   0.5 mg at 04/22/24 0614    ketorolac (TORADOL) injection 15 mg  15 mg Intravenous Q6H PRN Princess Hurst MD   15 mg at 04/21/24 1210    midodrine (PROAMATINE) tablet 10 mg  10 mg Oral TID AC Princess Hurst MD   10 mg at 04/21/24 1808    ondansetron ODT (ZOFRAN-ODT) disintegrating tablet 4 mg  4 mg Oral Q6H PRN Princess Hurst MD        Or    ondansetron (ZOFRAN) injection 4 mg  4 mg Intravenous Q6H PRN Princess Hurst MD   4 mg at 04/21/24 1824    oxyCODONE (ROXICODONE) immediate release tablet 5 mg  5 mg Oral Q4H PRN Princess Hurst MD        sertraline (ZOLOFT) tablet 25 mg  25 mg Oral Daily Princess Hurst MD        sodium chloride 0.9 % flush 10 mL  10 mL Intravenous Q12H Princess Hurst MD   10 mL at 04/21/24 2008    sodium chloride 0.9 % flush 10 mL  10 mL Intravenous PRN Princess Hurst MD        sodium chloride 0.9 % infusion 40 mL  40 mL Intravenous PRN Princess Hurst MD         Current Outpatient Medications   Medication Sig Dispense Refill    CBD (cannabidiol) oral oil Take 1 drop by mouth Daily.      coenzyme Q10 100 MG capsule Take 1 capsule by mouth Daily.      levothyroxine (SYNTHROID, LEVOTHROID) 88 MCG tablet Take 1 tablet by mouth Daily.      magnesium chloride ER 64 MG DR tablet Take 2 tablets by mouth Every Night.      Probiotic Product (PROBIOTIC ADVANCED PO) Take 1 capsule by mouth Daily.      sertraline (ZOLOFT) 25 MG tablet Take 1  tablet by mouth Daily.      Turmeric 500 MG capsule Take 3 capsules by mouth Daily.      Zinc 50 MG tablet Take 1 tablet by mouth Daily.       Physician Progress Notes (last 24 hours)  Notes from 04/21/24 0959 through 04/22/24 0959   No notes of this type exist for this encounter.          Consult Notes (last 24 hours)        Mauricio Jarquin DO at 04/21/24 1316        Consult Orders    1. Inpatient Palliative Care MD Consult [[id number]] ordered by Princess Hurst MD at 04/21/24 1108                 Jay Pelaez DO  Consulting physician: Princess Hurst    Chief Complaint   Patient presents with    Vomiting    Dizziness       Reason for consult: [facility]    HPI: Patient is a 78-year female brought hospital due to vomiting, dizziness, and just overall decline.  Patient has a history of colon cancer which she was apparently diagnosed with about 2 years ago.  Patient has declined any treatment for her colon cancer.  Recently of note the patient is also been diagnosed with urinary tract infection and apparently has declined any treatment for urinary tract infection.  Patient brought hospital again with dizziness and just overall debility and weakness.  Patient has been found to be hypotensive and admitted to telemetry and started on IV fluids.  Patient states that she is feeling somewhat better since being admitted to the hospital and started on IV fluids.  At home the patient states that she has been doing fairly well able to take care of all of her own ADLs with no significant issues.    Pain assesment: Positive, back, mild to moderate in nature, intermittent, aching in nature.    Dyspnea: denies    N/V: denies    PPS: 40       Past Medical History:   Diagnosis Date    Clostridium difficile infection 07/2022    Colon polyps     Coronary artery disease     Depression     Disease of thyroid gland     Elevated cholesterol     Hiatal hernia     Rectal cancer      Past Surgical History:   Procedure Laterality Date     CARDIAC CATHETERIZATION  1992    COLON RESECTION N/A 7/13/2022    Procedure: ROBOTIC LOW ANTERIOR RESECTION , COLOSTOMY;  Surgeon: Kristi Henderson MD;  Location:  [facility];  Service: Robotics - DaVinci;  Laterality: N/A;    COLONOSCOPY  01/2022    COLOSTOMY N/A 7/15/2022    Procedure: COLOSTOMY REVISION;  Surgeon: Kristi Henderson MD;  Location:  [facility];  Service: General;  Laterality: N/A;    CORONARY ARTERY BYPASS GRAFT  1992    2 VESSEL    CYSTOSCOPY W/ URETERAL STENT PLACEMENT Bilateral 7/13/2022    Procedure: CYSTOSCOPY URETERAL CATHETER/STENT INSERTION;  Surgeon: Kristi Henderson MD;  Location:  [facility];  Service: Robotics - DaVinci;  Laterality: Bilateral;     Current Code Status       Date Active Code Status Order ID Comments User Context       4/21/2024 0846 No CPR (Do Not Attempt to Resuscitate) [id number]  Steve Harris MD ED        Question Answer    Code Status (Patient has no pulse and is not breathing) No CPR (Do Not Attempt to Resuscitate)    Medical Interventions (Patient has pulse or is breathing) Limited Support    Medical Intervention Limits: NO intubation (DNI)     NO antiarrhythmic drugs     NO artificial nutrition     NO dialysis     NO cardioversion     NO vasopressors                  Current Facility-Administered Medications   Medication Dose Route Frequency Provider Last Rate Last Admin    sennosides-docusate (PERICOLACE) 8.6-50 MG per tablet 2 tablet  2 tablet Oral BID PRN Princess Hurst MD        And    polyethylene glycol (MIRALAX) packet 17 g  17 g Oral Daily PRN Princess Hurst MD        And    bisacodyl (DULCOLAX) EC tablet 5 mg  5 mg Oral Daily PRN Princess Hurst MD        And    bisacodyl (DULCOLAX) suppository 10 mg  10 mg Rectal Daily PRN Princess Hurst MD        Calcium Replacement - Follow Nurse / BPA Driven Protocol   Does not apply PRN Princess Hurst MD        Enoxaparin Sodium (LOVENOX) syringe 40 mg  40 mg Subcutaneous Daily Princess Hurst MD         HYDROmorphone (DILAUDID) injection 0.25 mg  0.25 mg Intravenous Q2H PRN Princess Hurst MD        ketorolac (TORADOL) injection 15 mg  15 mg Intravenous Q6H PRN Princess Hurst MD   15 mg at 04/21/24 1210    lactobacillus acidophilus (RISAQUAD) capsule 1 capsule  1 capsule Oral Daily Princess Hurst MD        levothyroxine (SYNTHROID, LEVOTHROID) tablet 88 mcg  88 mcg Oral Q AM Princess Hurst MD        Magnesium Standard Dose Replacement - Follow Nurse / BPA Driven Protocol   Does not apply PRN Princess Hurst MD        ondansetron ODT (ZOFRAN-ODT) disintegrating tablet 4 mg  4 mg Oral Q6H PRN Princess Hurst MD        Or    ondansetron (ZOFRAN) injection 4 mg  4 mg Intravenous Q6H PRN Princess Hurst MD   4 mg at 04/21/24 1210    oxyCODONE (ROXICODONE) immediate release tablet 5 mg  5 mg Oral Q4H PRN Princess Hurst MD        Phosphorus Replacement - Follow Nurse / BPA Driven Protocol   Does not apply PRN Princess Hurst MD        Potassium Replacement - Follow Nurse / BPA Driven Protocol   Does not apply Princess Nichole MD        sertraline (ZOLOFT) tablet 25 mg  25 mg Oral Daily Princess Hurst MD        sodium chloride 0.9 % flush 10 mL  10 mL Intravenous Q12H Princess Hurst MD        sodium chloride 0.9 % flush 10 mL  10 mL Intravenous PRN Princess Hurst MD        sodium chloride 0.9 % infusion 40 mL  40 mL Intravenous PRN Princess Hurst MD        sodium chloride 0.9 % infusion  125 mL/hr Intravenous Continuous Princess Hurst  mL/hr at 04/21/24 1043 125 mL/hr at 04/21/24 1043     sodium chloride, 125 mL/hr, Last Rate: 125 mL/hr (04/21/24 1043)        senna-docusate sodium **AND** polyethylene glycol **AND** bisacodyl **AND** bisacodyl    Calcium Replacement - Follow Nurse / BPA Driven Protocol    HYDROmorphone    ketorolac    Magnesium Standard Dose Replacement - Follow Nurse / BPA Driven Protocol    ondansetron ODT **OR** ondansetron     "oxyCODONE    Phosphorus Replacement - Follow Nurse / BPA Driven Protocol    Potassium Replacement - Follow Nurse / BPA Driven Protocol    sodium chloride    sodium chloride  Allergies   Allergen Reactions    Tetracycline Rash     Family History   Problem Relation Age of Onset    Colon cancer Sister      Social History     Socioeconomic History    Marital status:    Tobacco Use    Smoking status: Former     Current packs/day: 0.00     Types: Cigarettes     Quit date:      Years since quittin.3    Smokeless tobacco: Never   Vaping Use    Vaping status: Never Used   Substance and Sexual Activity    Alcohol use: Not Currently     Alcohol/week: 1.0 - 2.0 standard drink of alcohol     Types: 1 - 2 Glasses of wine per week    Drug use: Never    Sexual activity: Defer     Review of Systems -all other reviewed and found negative septa mentioned in the HPI      BP 91/65   Pulse 89   Temp 98 °F (36.7 °C) (Oral)   Resp 20   Ht 154.9 cm (60.98\")   Wt 64.6 kg (142 lb 6.7 oz)   SpO2 94%   BMI 26.92 kg/m²     Intake/Output Summary (Last 24 hours) at 2024 1316  Last data filed at 2024 1300  Gross per 24 hour   Intake 600 ml   Output --   Net 600 ml     Physical Exam:      General Appearance:  Alert, cooperative, in no acute distress   Head:  Normocephalic, without obvious abnormality, atraumatic   Eyes:  Lids and lashes normal, conjunctivae and sclerae normal, no icterus, no pallor, corneas clear, PERRLA   Ears:  Ears appear intact with no abnormalities noted   Throat:  No oral lesions, no thrush, oral mucosa moist   Neck:  No adenopathy, supple, trachea midline, no thyromegaly, no carotid bruit, no JVD   Back:  No kyphosis present, no scoliosis present, no skin lesions, erythema or scars, no tenderness to percussion, or palpation, range of motion normal   Lungs:  Clear to auscultation,respirations regular, even and unlabored    Heart:  Regular rhythm and normal rate, normal S1 and S2, no murmur, no " gallop, no rub, no click   Breast Exam:  Deferred   Abdomen:  Normal bowel sounds, no masses, no organomegaly, soft non-tender, non-distended, no guarding, no rebound tenderness   Genitalia:  Deferred   Extremities:  Moves all extremities well, no edema, no cyanosis, no redness   Pulses:  Pulses palpable and equal bilaterally   Skin:  No bleeding, bruising or rash   Lymph nodes:  No palpable adenopathy   Neurologic:  Cranial nerves 2 - 12 grossly intact, sensation intact, DTR present and equal bilaterally       Results from last 7 days   Lab Units 04/21/24  0654   WBC 10*3/mm3 5.00   HEMOGLOBIN g/dL 13.1   HEMATOCRIT % 42.0   PLATELETS 10*3/mm3 262     Results from last 7 days   Lab Units 04/21/24  0654   SODIUM mmol/L 138   POTASSIUM mmol/L 4.1   CHLORIDE mmol/L 102   CO2 mmol/L 18.0*   BUN mg/dL 19   CREATININE mg/dL 0.81   CALCIUM mg/dL 8.8   BILIRUBIN mg/dL 0.6   ALK PHOS U/L 79   ALT (SGPT) U/L 6   AST (SGOT) U/L 12   GLUCOSE mg/dL 227*     Results from last 7 days   Lab Units 04/21/24  0654   SODIUM mmol/L 138   POTASSIUM mmol/L 4.1   CHLORIDE mmol/L 102   CO2 mmol/L 18.0*   BUN mg/dL 19   CREATININE mg/dL 0.81   GLUCOSE mg/dL 227*   CALCIUM mg/dL 8.8     Imaging Results (Last 72 Hours)       Procedure Component Value Units Date/Time    CT Angiogram Chest [[id number]] Collected: 04/21/24 0801     Updated: 04/21/24 0826    Narrative:      CT ABDOMEN PELVIS W CONTRAST, CT ANGIOGRAM CHEST    Date of Exam: 4/21/2024 7:37 AM EDT    Indication: s/p renal stent placement eval for renal abcess. hypoxia, recent surgery, eval for pe    Comparison: CT chest abdomen pelvis 2/1/2022.    Technique: Axial CT images were obtained of the chest, abdomen, and pelvis following the uneventful intravenous administration of 85 mL Isovue-370. Reconstructed coronal and sagittal images were also obtained. Automated exposure control and iterative   construction methods were used.    Findings:    CHEST:    Cardiovascular: No pericardial  effusion. Normal caliber thoracic aorta. Dilatation of the main pulmonary artery measuring 3.5 cm, which can be seen with pulmonary hypertension. Coronary artery calcifications. Heart appears enlarged.    Lymph nodes and mediastinum: Mildly enlarged bilateral axillary lymph nodes. Prominent but not pathologically enlarged bilateral hilar and mediastinal lymph nodes.    Lungs: Numerous bilateral noncalcified pulmonary nodules, new from prior exam. For example, there is a 2.6 x 1.7 left lower lobe nodule (axial image 70) and a 1.9 x 1.2 cm right lower lobe nodule (series 4 image 61). Central airways are patent. No   evidence of superimposed pneumonia.    Pleura: No pleural effusion or pneumothorax.    Bones and soft tissues: No acute or suspicious osseous abnormality. Sternotomy. Multilevel degenerative changes of the thoracic spine. Soft tissues are within normal limits.    ABDOMEN and PELVIS:    Liver: Multiple solid appearing liver lesions, increased in size and number from prior exam. For example, a lesion in the left hepatic lobe measures 6.0 x 3.7 cm (axial image 19) and a lesion in the right hepatic lobe measures 3.9 x 2.9 cm (axial image   38).    Gallbladder and bile ducts: Gallbladder is unremarkable. No biliary ductal dilatation.    Pancreas: No pancreatic duct dilation. No surrounding inflammation.    Spleen: Normal in size. Small adjacent splenule.    Adrenal glands: No discrete adrenal nodule.    Kidneys and ureters: Moderate right-sided hydroureteronephrosis with urothelial thickening and slightly delayed nephrogram, with nephroureteral stent in place. No evidence of renal abscess. No left-sided hydronephrosis.    Urinary bladder: Unremarkable.    Reproductive Organs: Unremarkable.    Stomach and Bowel: Postsurgical changes of low anterior resection and colostomy. No evidence of bowel obstruction. Hiatal hernia.    Lymph nodes: No pathologically enlarged lymph nodes.    Vasculature: No aneurysmal  dilation of the abdominal aorta. Atherosclerosis.    Peritoneum and retroperitoneum: No free air or free fluid.    Bones and soft tissues: No acute or suspicious osseous abnormality. Multilevel degenerative changes of the lumbar spine. Fat-containing left inguinal hernia. Left lower quadrant colostomy, as above, with parastomal hernia.      Impression:      Impression:    Chest:  - Numerous new bilateral pulmonary nodules, likely metastatic disease.  - Mildly enlarged bilateral axillary lymph nodes as well as prominent bilateral hilar and mediastinal lymph nodes. Findings are nonspecific and may represent reactive lymphadenopathy and/or rimma metastatic disease.  - No evidence of pulmonary embolism.    Abdomen and pelvis:  - Increased size and number of hepatic metastatic disease.  - Right-sided nephroureteral stent in place with findings of obstruction including moderate hydroureteronephrosis and slightly delayed nephrogram. There is diffuse urothelial enhancement without evidence of renal abscess, as questioned.   - Postsurgical changes of low anterior resection and left lower quadrant colostomy with parastomal hernia.      Electronically Signed: Kamron Simons MD    4/21/2024 8:23 AM EDT    Workstation ID: [id number]    CT Abdomen Pelvis With Contrast [[id number]] Collected: 04/21/24 0801     Updated: 04/21/24 0826    Narrative:      CT ABDOMEN PELVIS W CONTRAST, CT ANGIOGRAM CHEST    Date of Exam: 4/21/2024 7:37 AM EDT    Indication: s/p renal stent placement eval for renal abcess. hypoxia, recent surgery, eval for pe    Comparison: CT chest abdomen pelvis 2/1/2022.    Technique: Axial CT images were obtained of the chest, abdomen, and pelvis following the uneventful intravenous administration of 85 mL Isovue-370. Reconstructed coronal and sagittal images were also obtained. Automated exposure control and iterative   construction methods were used.    Findings:    CHEST:    Cardiovascular: No pericardial effusion.  Normal caliber thoracic aorta. Dilatation of the main pulmonary artery measuring 3.5 cm, which can be seen with pulmonary hypertension. Coronary artery calcifications. Heart appears enlarged.    Lymph nodes and mediastinum: Mildly enlarged bilateral axillary lymph nodes. Prominent but not pathologically enlarged bilateral hilar and mediastinal lymph nodes.    Lungs: Numerous bilateral noncalcified pulmonary nodules, new from prior exam. For example, there is a 2.6 x 1.7 left lower lobe nodule (axial image 70) and a 1.9 x 1.2 cm right lower lobe nodule (series 4 image 61). Central airways are patent. No   evidence of superimposed pneumonia.    Pleura: No pleural effusion or pneumothorax.    Bones and soft tissues: No acute or suspicious osseous abnormality. Sternotomy. Multilevel degenerative changes of the thoracic spine. Soft tissues are within normal limits.    ABDOMEN and PELVIS:    Liver: Multiple solid appearing liver lesions, increased in size and number from prior exam. For example, a lesion in the left hepatic lobe measures 6.0 x 3.7 cm (axial image 19) and a lesion in the right hepatic lobe measures 3.9 x 2.9 cm (axial image   38).    Gallbladder and bile ducts: Gallbladder is unremarkable. No biliary ductal dilatation.    Pancreas: No pancreatic duct dilation. No surrounding inflammation.    Spleen: Normal in size. Small adjacent splenule.    Adrenal glands: No discrete adrenal nodule.    Kidneys and ureters: Moderate right-sided hydroureteronephrosis with urothelial thickening and slightly delayed nephrogram, with nephroureteral stent in place. No evidence of renal abscess. No left-sided hydronephrosis.    Urinary bladder: Unremarkable.    Reproductive Organs: Unremarkable.    Stomach and Bowel: Postsurgical changes of low anterior resection and colostomy. No evidence of bowel obstruction. Hiatal hernia.    Lymph nodes: No pathologically enlarged lymph nodes.    Vasculature: No aneurysmal dilation of the  abdominal aorta. Atherosclerosis.    Peritoneum and retroperitoneum: No free air or free fluid.    Bones and soft tissues: No acute or suspicious osseous abnormality. Multilevel degenerative changes of the lumbar spine. Fat-containing left inguinal hernia. Left lower quadrant colostomy, as above, with parastomal hernia.      Impression:      Impression:    Chest:  - Numerous new bilateral pulmonary nodules, likely metastatic disease.  - Mildly enlarged bilateral axillary lymph nodes as well as prominent bilateral hilar and mediastinal lymph nodes. Findings are nonspecific and may represent reactive lymphadenopathy and/or rimma metastatic disease.  - No evidence of pulmonary embolism.    Abdomen and pelvis:  - Increased size and number of hepatic metastatic disease.  - Right-sided nephroureteral stent in place with findings of obstruction including moderate hydroureteronephrosis and slightly delayed nephrogram. There is diffuse urothelial enhancement without evidence of renal abscess, as questioned.   - Postsurgical changes of low anterior resection and left lower quadrant colostomy with parastomal hernia.      Electronically Signed: Kamron Simons MD    4/21/2024 8:23 AM EDT    Workstation ID: [id number]          Impression: Colon cancer with lung mets  Neoplastic pain  Debility  [facility]    Plan: This point, did not have any goals of care conversations patient, however her CODE STATUS noted to be DNR with limited support which I agree with at present.  Most likely we will need to have conversations with her going forward about her overall wishes as she is been declining any  treatment for her colon cancer which is metastatic at this point.  Current pain regimen seems to be working appropriately.  Will adjust as we proceed forward, however the patient is fairly hypotensive so this may make using opioids somewhat of a issue.        Mauricio Jarquin DO  04/21/24  13:16 EDT              Electronically signed by Mauricio Jarquin  DO JOSÉ at 24 1321       Connie Reynaga at 24 1248        Consult Orders    1. Inpatient Consult to Advance Care Planning [[id number]] ordered by Princess Hurst MD at 24 1118                 Assisted patient with completion of her living will.  Patient has named her daughter, Esperanza Cardoso, as her primary HCS and her granddaughter, Steffanie Cardoso, as her secondary HCS.  If at EOL, patient does not want to have her life extended by mechanical means nor does she want artificial nutrition.  Rather, she wants to die naturally with pain meds for comfort.  I notarized the document, faxed a copy to medical records, placed a copy in her chart and returned the original plus two copies to the patient.    Electronically signed by Connie Reynaga at 24 1251        Liat Dewitt DO   Physician  Hospitalist     Discharge Summary      Signed     Date of Service: 24  Creation Time: 24     Signed              [facility] Medicine Services  DEATH SUMMARY     Patient Name: Helena Cardoso  : 1945  MRN: [id number]     Date of Admission: 2024  Date of Death:  2024  Time of Death:  714     Primary Care Physician: Jay Pelaez DO     Consults         Date and Time Order Name Status Description     2024 11:09 AM Inpatient Palliative Care MD Consult Completed                  Summary of Hospital Events   Presenting Problem: weakness           Active Hospital Problems     Diagnosis   POA    **Acute respiratory failure [J96.00]   Yes       Resolved Hospital Problems   No resolved problems to display.            Hospital Course:  Helena Cardoso was a 78 y.o. female Helena Carodso is a 78 y.o. female with history of rectal cancer status post resection/colostomy (2022, Dr. Brito), right ureteral stenosis status post stent (follows with Dr. Vazquez at , placed on ) with weakness and pain.     This patient's problems and plans  were partially entered by my partner and updated as appropriate by me 04/22/24.     Acute hypoxic respiratory failure  -Requiring 5 L.  Suspect secondary to lung mets.  -Will likely require oxygen on discharge     Metastatic colon cancer  -History of colon cancer in July 22 with resection and ostomy placement.  Declined treatment for this at that time  -CT chest on presentation shows numerous bilateral pulmonary nodules and axillary, hilar and mediastinal lymphadenopathy.  Negative for PE  -CT abdomen pelvis shows hepatic mets, right ureteral stent with findings of obstruction, moderate hydronephrosis  -Start IV and p.o. pain medications  -Discussed with patient and family, agreeable to palliative care consult but do not feel she is ready for hospice. Currently DNR/DNI.     Possible UTI in the setting of ureteral stent  Ureteral obstruction the hydronephrosis  -Note on CT scan, renal function within normal limits  -Currently without leukocytosis, normal procalcitonin.  Does have low-grade fevers.  -Need to consider antibiotics, was prescribed 1 to 2 weeks ago.  Unable to see culture results from [facility].  -Discussed with daughter, do not want any invasive procedures unless absolutely necessary.  Will do fear urology consult.  Creatinine within normal limits  -Patient would not want antibiotics if she has a urinary tract infection.    -Probiotics     Lactic acidosis  -Lactate 7 on presentation.  -Will give 1L LR bolus and then continue IVFs.  -Reflex pending     Elevated troponin  -Suspect demand ischemia secondary to respiratory failure  -Repeat pending     History of C. difficile   -Need to be cautious with antibiotics and likely use prophylactic vancomycin     Family would like to minimize hospital stay. Recommended fluids, pain control and palliative consult.      Addendum 4/21/24-4:21pm:  Acute MI:  -Troponin trended up from .  Went to reevaluate patient, not complaining of any chest pain.  EKG was obtained  which showed some T wave inversions but no ST elevation or depression.  -Long discussion with patient and friend at the bedside.  Friend is an anesthesiologist and helped patient understand the situation.  Noted that normally would start blood thinners and get cardiology involved plus or minus a left heart cath.  Given prior conversations with the patient she did not want any invasive measures.  Decided to pursue comfort measures and avoid any unnecessary interventions or medications.  Will defer starting heparin, Plavix, aspirin is within goals of care. Lactate trended down     Hypotension  -Has been hypotensive this morning with systolics in the 80s and MAP of 65.  She has received 1.5 L fluid without improvement.  We discussed not focusing on the numbers and focusing on medications to maintain comfort which could possibly reduce her blood pressures.  Currently not having any symptoms.  Will go ahead and start midodrine 3 times daily.  Would like to avoid additional fluids given the fact that she may have some left ventricular dysfunction from her cardiac event.     I visited discussions regarding hospice given the new findings.  Overall the goal is to get her home and have symptoms controlled.  She is hesitant regarding hospice due to her prior bad experience with her daughter.  I discussed the benefits of hospice in order to keeping her out of the hospital and also managing her symptoms.  Would benefit from IV Lasix at home if it is possible if she needs it later on.  Patient agreeable to talk to hospice tomorrow once her family gets here from [place].  Will place a hospice consult for tomorrow.     Patient is of sound mind and able to maker her own decisions.         Official Cause of Death and any directly related diagnoses:  metastatic colon cancer, hypotension, acute MI        Liat Dewitt,   04/22/24                    Routing History

## 2024-04-22 NOTE — PLAN OF CARE
Problem: Adult Inpatient Plan of Care  Goal: Plan of Care Review  Outcome: Ongoing, Not Progressing  Goal: Patient-Specific Goal (Individualized)  Outcome: Ongoing, Not Progressing  Goal: Absence of Hospital-Acquired Illness or Injury  Outcome: Ongoing, Not Progressing  Intervention: Identify and Manage Fall Risk  Recent Flowsheet Documentation  Taken 4/22/2024 0600 by Arti Casarez RN  Safety Promotion/Fall Prevention: activity supervised  Taken 4/22/2024 0400 by Arti Casarez RN  Safety Promotion/Fall Prevention: activity supervised  Taken 4/22/2024 0200 by Arti Casarez RN  Safety Promotion/Fall Prevention: activity supervised  Taken 4/22/2024 0000 by Arti Casarez RN  Safety Promotion/Fall Prevention: activity supervised  Taken 4/21/2024 2200 by Arti Casarez RN  Safety Promotion/Fall Prevention: activity supervised  Taken 4/21/2024 2000 by Arti Casarez RN  Safety Promotion/Fall Prevention: activity supervised  Intervention: Prevent Skin Injury  Recent Flowsheet Documentation  Taken 4/22/2024 0600 by Arti Casarez RN  Body Position: position changed independently  Taken 4/22/2024 0400 by Arti Casarez RN  Body Position: position changed independently  Taken 4/22/2024 0200 by Arti Casarez RN  Body Position: position changed independently  Taken 4/22/2024 0000 by Arti Casarez RN  Body Position: position changed independently  Taken 4/21/2024 2200 by Arti Casarez RN  Body Position: position changed independently  Taken 4/21/2024 2000 by Arti Csaarez RN  Body Position: position changed independently  Intervention: Prevent and Manage VTE (Venous Thromboembolism) Risk  Recent Flowsheet Documentation  Taken 4/22/2024 0600 by Arti Casarez RN  Activity Management: activity encouraged  Taken 4/22/2024 0400 by Arti Casarez RN  Activity Management: activity encouraged  Taken 4/22/2024 0200 by Arti Casarez RN  Activity Management: activity  encouraged  Taken 4/22/2024 0000 by Arti Casarez RN  Activity Management: activity encouraged  Taken 4/21/2024 2200 by Arti Casarez RN  Activity Management: activity encouraged  Taken 4/21/2024 2000 by Arti Casarez RN  Activity Management: activity encouraged  Intervention: Prevent Infection  Recent Flowsheet Documentation  Taken 4/22/2024 0600 by Arti Casarez RN  Infection Prevention: hand hygiene promoted  Taken 4/22/2024 0400 by Arti Casarez RN  Infection Prevention: hand hygiene promoted  Taken 4/22/2024 0200 by Arti Casarez RN  Infection Prevention: hand hygiene promoted  Taken 4/22/2024 0000 by Arti Casarez RN  Infection Prevention: hand hygiene promoted  Taken 4/21/2024 2200 by Arti Casarez RN  Infection Prevention: hand hygiene promoted  Taken 4/21/2024 2000 by Arti Casarez RN  Infection Prevention: hand hygiene promoted  Goal: Optimal Comfort and Wellbeing  Outcome: Ongoing, Not Progressing  Intervention: Monitor Pain and Promote Comfort  Recent Flowsheet Documentation  Taken 4/21/2024 1945 by Arti Casraez RN  Pain Management Interventions: (see mar) other (see comments)  Intervention: Provide Person-Centered Care  Recent Flowsheet Documentation  Taken 4/21/2024 1945 by Arti Casarez RN  Trust Relationship/Rapport: care explained  Goal: Readiness for Transition of Care  Outcome: Ongoing, Not Progressing     Problem: Skin Injury Risk Increased  Goal: Skin Health and Integrity  Outcome: Ongoing, Not Progressing  Intervention: Optimize Skin Protection  Recent Flowsheet Documentation  Taken 4/22/2024 0600 by Arti Casarez RN  Head of Bed (HOB) Positioning: HOB elevated  Taken 4/22/2024 0400 by Arti Casarez RN  Head of Bed (HOB) Positioning: HOB elevated  Taken 4/22/2024 0200 by Arti Casarez RN  Head of Bed (HOB) Positioning: HOB elevated  Taken 4/22/2024 0000 by Arti Casarez RN  Head of Bed (HOB) Positioning: HOB elevated  Taken 4/21/2024  2200 by Arti Casarez, RN  Head of Bed ([facility]) Positioning: HOB elevated  Taken 4/21/2024 2000 by Arti Casarez, RN  Head of Bed ([facility]) Positioning: HOB elevated   Goal Outcome Evaluation:          Patient remains in the bed, chest rise and fall noted. Patient calm and cooperative. Patient appears in no distress, call bell within reach. Bed locked and the lowered to the floor.

## 2024-04-22 NOTE — DISCHARGE SUMMARY
[facility] Medicine Services  DEATH SUMMARY    Patient Name: Helena Cardoso  : 1945  MRN: [id number]    Date of Admission: 2024  Date of Death:  2024  Time of Death:  714    Primary Care Physician: Jay Pelaez DO    Consults       Date and Time Order Name Status Description    2024 11:09 AM Inpatient Palliative Care MD Consult Completed             Summary of Hospital Events   Presenting Problem: weakness    Active Hospital Problems    Diagnosis  POA    **Acute respiratory failure [J96.00]  Yes      Resolved Hospital Problems   No resolved problems to display.          Hospital Course:  Helena Cardoso was a 78 y.o. female Helena Cardoso is a 78 y.o. female with history of rectal cancer status post resection/colostomy (2022, Dr. Brito), right ureteral stenosis status post stent (follows with Dr. Vazquez at , placed on ) with weakness and pain.    This patient's problems and plans were partially entered by my partner and updated as appropriate by me 24.     Acute hypoxic respiratory failure  -Requiring 5 L.  Suspect secondary to lung mets.  -Will likely require oxygen on discharge     Metastatic colon cancer  -History of colon cancer in  with resection and ostomy placement.  Declined treatment for this at that time  -CT chest on presentation shows numerous bilateral pulmonary nodules and axillary, hilar and mediastinal lymphadenopathy.  Negative for PE  -CT abdomen pelvis shows hepatic mets, right ureteral stent with findings of obstruction, moderate hydronephrosis  -Start IV and p.o. pain medications  -Discussed with patient and family, agreeable to palliative care consult but do not feel she is ready for hospice. Currently DNR/DNI.     Possible UTI in the setting of ureteral stent  Ureteral obstruction the hydronephrosis  -Note on CT scan, renal function within normal limits  -Currently without leukocytosis, normal procalcitonin.   Does have low-grade fevers.  -Need to consider antibiotics, was prescribed 1 to 2 weeks ago.  Unable to see culture results from [facility].  -Discussed with daughter, do not want any invasive procedures unless absolutely necessary.  Will do fear urology consult.  Creatinine within normal limits  -Patient would not want antibiotics if she has a urinary tract infection.    -Probiotics     Lactic acidosis  -Lactate 7 on presentation.  -Will give 1L LR bolus and then continue IVFs.  -Reflex pending     Elevated troponin  -Suspect demand ischemia secondary to respiratory failure  -Repeat pending     History of C. difficile   -Need to be cautious with antibiotics and likely use prophylactic vancomycin     Family would like to minimize hospital stay. Recommended fluids, pain control and palliative consult.      Addendum 4/21/24-4:21pm:  Acute MI:  -Troponin trended up from .  Went to reevaluate patient, not complaining of any chest pain.  EKG was obtained which showed some T wave inversions but no ST elevation or depression.  -Long discussion with patient and friend at the bedside.  Friend is an anesthesiologist and helped patient understand the situation.  Noted that normally would start blood thinners and get cardiology involved plus or minus a left heart cath.  Given prior conversations with the patient she did not want any invasive measures.  Decided to pursue comfort measures and avoid any unnecessary interventions or medications.  Will defer starting heparin, Plavix, aspirin is within goals of care. Lactate trended down     Hypotension  -Has been hypotensive this morning with systolics in the 80s and MAP of 65.  She has received 1.5 L fluid without improvement.  We discussed not focusing on the numbers and focusing on medications to maintain comfort which could possibly reduce her blood pressures.  Currently not having any symptoms.  Will go ahead and start midodrine 3 times daily.  Would like to avoid additional  fluids given the fact that she may have some left ventricular dysfunction from her cardiac event.     I visited discussions regarding hospice given the new findings.  Overall the goal is to get her home and have symptoms controlled.  She is hesitant regarding hospice due to her prior bad experience with her daughter.  I discussed the benefits of hospice in order to keeping her out of the hospital and also managing her symptoms.  Would benefit from IV Lasix at home if it is possible if she needs it later on.  Patient agreeable to talk to hospice tomorrow once her family gets here from [place].  Will place a hospice consult for tomorrow.     Patient is of sound mind and able to maker her own decisions.       Official Cause of Death and any directly related diagnoses:  metastatic colon cancer, hypotension, acute MI      Liat Dewitt,   04/22/24

## 2024-04-22 NOTE — SIGNIFICANT NOTE
Exam confirms with auscultation zero audible heart tones and zero audible respirations. Ms.Arlene RAMONITA Cardoso was pronounced dead at 0714.  MD notified by Patient's RN.    Isa Mcbride RN  Clinical House Supervisor  4/22/2024 07:35 EDT

## 2024-04-22 NOTE — NURSING NOTE
Patient's family called on call light. Patient breathing very fast and sweating. Family asked patient to be placed back on oxygen. I asked patient if she would like her oxygen back on, she said yes. PT was placed on 4L NC for comfort. I asked patient if she would like something to help her breathing discomfort, pt was agreeable. I asked patient if she would like me to change her gown, she said no.

## 2024-04-26 LAB
BACTERIA SPEC AEROBE CULT: ABNORMAL
GRAM STN SPEC: ABNORMAL
ISOLATED FROM: ABNORMAL

## 2024-07-26 NOTE — PROGRESS NOTES
Met with patient to discuss home health and she is agreeable to [facility]. P/C to Dr Jay Pelaez's office to verify that he will sign home health orders and message left--Beto BARAJAS([facility])Hospital Liaison   Detail Level: Detailed

## (undated) DEVICE — LOOP OSTOMY BRIDGE - STERILE: Brand: HOLLISTER

## (undated) DEVICE — GLV SURG PREMIERPRO MIC LTX PF SZ6 BRN

## (undated) DEVICE — GLV SURG PREMIERPRO MIC LTX PF SZ6.5 BRN

## (undated) DEVICE — POOLE SUCTION INSTRUMENT WITH REMOVABLE SHEATH: Brand: POOLE

## (undated) DEVICE — MARYLAND JAW LAPAROSCOPIC SEALER/DIVIDER COATED: Brand: LIGASURE

## (undated) DEVICE — SUT VIC 3/0 SH 36IN J527H

## (undated) DEVICE — LEGGINGS, PAIR, 29X43, STERILE: Brand: MEDLINE

## (undated) DEVICE — 3M™ STERI-DRAPE™ OPERATION TAPE, 10 CM X 55 CM 9099: Brand: 3M™ STERI-DRAPE™

## (undated) DEVICE — SUT ETHLN 3/0 FSLX 30IN 1673H

## (undated) DEVICE — SUT PDS 1 CTX 36IN VIO PDP371T

## (undated) DEVICE — [HIGH FLOW INSUFFLATOR,  DO NOT USE IF PACKAGE IS DAMAGED,  KEEP DRY,  KEEP AWAY FROM SUNLIGHT,  PROTECT FROM HEAT AND RADIOACTIVE SOURCES.]: Brand: PNEUMOSURE

## (undated) DEVICE — TOTAL TRAY, 16FR 10ML SIL FOLEY, URN: Brand: MEDLINE

## (undated) DEVICE — VESSEL SEALER EXTEND: Brand: ENDOWRIST

## (undated) DEVICE — ENDOPATH XCEL UNIVERSAL TROCAR STABLILITY SLEEVES: Brand: ENDOPATH XCEL

## (undated) DEVICE — INTENDED FOR TISSUE SEPARATION, AND OTHER PROCEDURES THAT REQUIRE A SHARP SURGICAL BLADE TO PUNCTURE OR CUT.: Brand: BARD-PARKER ® STAINLESS STEEL BLADES

## (undated) DEVICE — 1-PIECE DRAINABLE OSTOMY POUCH, CONVEX: Brand: KARAYA 5

## (undated) DEVICE — TIP COVER ACCESSORY

## (undated) DEVICE — 1-PIECE DRAINABLE OSTOMY POUCH, FLEXTEND: Brand: PREMIER

## (undated) DEVICE — TRENDELENBURG WINGPAD POSITIONING KIT DELUXE - WITHOUT BODY STRAP: Brand: SOULE MEDICAL

## (undated) DEVICE — PK LAP LASR CHOLE 10

## (undated) DEVICE — GLV SURG SENSICARE PI MIC PF SZ6 LF STRL

## (undated) DEVICE — SPNG LAP PREWSH SFTPK 18X18IN STRL PK/5

## (undated) DEVICE — ENDOCUT SCISSOR TIP, DISPOSABLE: Brand: RENEW

## (undated) DEVICE — SUT MNCRYL PLS ANTIB UD 4/0 PS2 18IN

## (undated) DEVICE — GLV SURG DERMASSURE GRN LF PF 7.0

## (undated) DEVICE — ADHS SKIN PREMIERPRO EXOFIN TOPICAL HI/VISC .5ML

## (undated) DEVICE — PATIENT RETURN ELECTRODE, SINGLE-USE, CONTACT QUALITY MONITORING, ADULT, WITH 9FT CORD, FOR PATIENTS WEIGING OVER 33LBS. (15KG): Brand: MEGADYNE

## (undated) DEVICE — JACKSON-PRATT 100CC BULB RESERVOIR: Brand: CARDINAL HEALTH

## (undated) DEVICE — JP PERF DRN SIL FLT 10MM FULL: Brand: CARDINAL HEALTH

## (undated) DEVICE — REDUCER: Brand: ENDOWRIST

## (undated) DEVICE — TOWEL,OR,DSP,ST,BLUE,STD,4/PK,20PK/CS: Brand: MEDLINE

## (undated) DEVICE — 3M™ STERI-DRAPE™ INSTRUMENT POUCH 1018: Brand: STERI-DRAPE™

## (undated) DEVICE — LAPAROSCOPY WOUND CLOSURE SYSTEM KIT CONSISTS OF:05391529640002; NEOCLOSE ANCHORGUIDE 5/12 & 8/15 US; MODEL NUMBER NCA515-U; QTY 10 AND05391529640019; NEOCLOSE AUTOANCHOR X2 PACK US; MODEL NUMBER NCA2-U;  QTY 10: Brand: NEOCLOSE ANCHORGUIDE REGULAR PORT CLOSURE KIT US

## (undated) DEVICE — BLANKT WARM UPPR/BDY ARM/OUT 57X196CM

## (undated) DEVICE — GLV SURG PREMIERPRO MIC LTX PF SZ7 BRN

## (undated) DEVICE — SUT VIC 0 TIES 18IN J912G

## (undated) DEVICE — MEDI-VAC YANKAUER SUCTION HANDLE: Brand: CARDINAL HEALTH

## (undated) DEVICE — SEAL

## (undated) DEVICE — IRRIGATOR BULB ASEPTO 60CC STRL

## (undated) DEVICE — ST TBG CONN PNEUMOCLEAR EVAC SMOKE HEAT/HUMID

## (undated) DEVICE — SYS CLS SKIN PREMIERPRO EXOFINFUSION 22CM

## (undated) DEVICE — LAPAROSCOPIC SMOKE FILTRATION SYSTEM: Brand: PALL LAPAROSHIELD® PLUS LAPAROSCOPIC SMOKE FILTRATION SYSTEM

## (undated) DEVICE — ENDOPATH XCEL BLADELESS TROCARS WITH STABILITY SLEEVES: Brand: ENDOPATH XCEL

## (undated) DEVICE — ANTIBACTERIAL UNDYED BRAIDED (POLYGLACTIN 910), SYNTHETIC ABSORBABLE SUTURE: Brand: COATED VICRYL

## (undated) DEVICE — COLUMN DRAPE

## (undated) DEVICE — CANNULA SEAL

## (undated) DEVICE — SUT MNCRYL PLS ANTIB UD 3/0 PS2 27IN

## (undated) DEVICE — PK UROL DAVINCI 10

## (undated) DEVICE — SUREFORM 45: Brand: SUREFORM

## (undated) DEVICE — CATHETER,URETHRAL,REDRUBBER,STRL,16FR: Brand: MEDLINE

## (undated) DEVICE — TUBING, SUCTION, 1/4" X 10', STRAIGHT: Brand: MEDLINE

## (undated) DEVICE — ROD OS LP SURFIT 2 1/2IN

## (undated) DEVICE — SAFESECURE,SECUREMENT,FOLEY CATH,STERILE: Brand: MEDLINE

## (undated) DEVICE — APPL CHLORAPREP TINTED 26ML TEAL

## (undated) DEVICE — 3M™ IOBAN™ 2 ANTIMICROBIAL INCISE DRAPE 6650EZ: Brand: IOBAN™ 2

## (undated) DEVICE — ARM DRAPE